# Patient Record
Sex: FEMALE | Race: WHITE | NOT HISPANIC OR LATINO | Employment: FULL TIME | URBAN - METROPOLITAN AREA
[De-identification: names, ages, dates, MRNs, and addresses within clinical notes are randomized per-mention and may not be internally consistent; named-entity substitution may affect disease eponyms.]

---

## 2017-01-29 ENCOUNTER — HOSPITAL ENCOUNTER (EMERGENCY)
Facility: MEDICAL CENTER | Age: 32
End: 2017-01-29
Attending: EMERGENCY MEDICINE
Payer: MEDICAID

## 2017-01-29 VITALS
SYSTOLIC BLOOD PRESSURE: 111 MMHG | BODY MASS INDEX: 31.64 KG/M2 | OXYGEN SATURATION: 95 % | DIASTOLIC BLOOD PRESSURE: 73 MMHG | WEIGHT: 178.57 LBS | HEART RATE: 68 BPM | RESPIRATION RATE: 16 BRPM | TEMPERATURE: 97.5 F

## 2017-01-29 DIAGNOSIS — H10.9 CONJUNCTIVITIS OF RIGHT EYE, UNSPECIFIED CONJUNCTIVITIS TYPE: ICD-10-CM

## 2017-01-29 DIAGNOSIS — H20.9 IRITIS: ICD-10-CM

## 2017-01-29 PROCEDURE — 99284 EMERGENCY DEPT VISIT MOD MDM: CPT

## 2017-01-29 RX ORDER — POLYMYXIN B SULFATE AND TRIMETHOPRIM 1; 10000 MG/ML; [USP'U]/ML
1 SOLUTION OPHTHALMIC EVERY 4 HOURS
Qty: 1 BOTTLE | Refills: 0 | Status: SHIPPED | OUTPATIENT
Start: 2017-01-29 | End: 2017-02-01

## 2017-01-29 RX ORDER — HYDROCODONE BITARTRATE AND ACETAMINOPHEN 5; 325 MG/1; MG/1
1-2 TABLET ORAL EVERY 6 HOURS PRN
Qty: 15 TAB | Refills: 0 | Status: SHIPPED | OUTPATIENT
Start: 2017-01-29 | End: 2017-06-23

## 2017-01-29 ASSESSMENT — ENCOUNTER SYMPTOMS
EYE PAIN: 1
EYE REDNESS: 1
PHOTOPHOBIA: 1

## 2017-01-29 NOTE — ED AVS SNAPSHOT
1/29/2017          Lizz Storey  [address]  [place] [address]    Dear Lizz:    [facility] wants to ensure your discharge home is safe and you or your loved ones have had all your questions answered regarding your care after you leave the hospital.    You may receive a telephone call within two days of your discharge.  This call is to make certain you understand your discharge instructions as well as ensure we provided you with the best care possible during your stay with us.     The call will only last approximately 3-5 minutes and will be done by a nurse.    Once again, we want to ensure your discharge home is safe and that you have a clear understanding of any next steps in your care.  If you have any questions or concerns, please do not hesitate to contact us, we are here for you.  Thank you for choosing [facility] for your healthcare needs.    Sincerely,    Brennan Gore    [facility]

## 2017-01-29 NOTE — ED AVS SNAPSHOT
Libra Alliance Access Code: Activation code not generated  Current Libra Alliance Status: Active    [employer]  A secure, online tool to manage your health information     [employer] Libra Alliance® is a secure, online tool that connects you to your personalized health information from the privacy of your home -- day or night - making it very easy for you to manage your healthcare. Once the activation process is completed, you can even access your medical information using the Libra Alliance eusebio, which is available for free in the Apple Eusebio store or Google Play store.     Libra Alliance provides the following levels of access (as shown below):   My Chart Features   [facility] Primary Care Doctor [facility]  Specialists [facility]  Urgent  Care Non-[facility]  Primary Care  Doctor   Email your healthcare team securely and privately 24/7 X X X X   Manage appointments: schedule your next appointment; view details of past/upcoming appointments X      Request prescription refills. X      View recent personal medical records, including lab and immunizations X X X X   View health record, including health history, allergies, medications X X X X   Read reports about your outpatient visits, procedures, consult and ER notes X X X X   See your discharge summary, which is a recap of your hospital and/or ER visit that includes your diagnosis, lab results, and care plan. X X       How to register for Libra Alliance:  1. Go to  [email or web address].  2. Click on the Sign Up Now box, which takes you to the New Member Sign Up page. You will need to provide the following information:  a. Enter your Libra Alliance Access Code exactly as it appears at the top of this page. (You will not need to use this code after you’ve completed the sign-up process. If you do not sign up before the expiration date, you must request a new code.)   b. Enter your date of birth.   c. Enter your home email address.   d. Click Submit, and follow the next screen’s instructions.  3. Create a Libra Alliance ID. This will  be your [employer] login ID and cannot be changed, so think of one that is secure and easy to remember.  4. Create a [employer] password. You can change your password at any time.  5. Enter your Password Reset Question and Answer. This can be used at a later time if you forget your password.   6. Enter your e-mail address. This allows you to receive e-mail notifications when new information is available in [employer].  7. Click Sign Up. You can now view your health information.    For assistance activating your [employer] account, call [phone]

## 2017-01-29 NOTE — ED NOTES
Pt reports foreign object to right eye after putting on eye make up yesterday. Eye is red. Pt reports visual changes. Pt in NAD

## 2017-01-29 NOTE — ED AVS SNAPSHOT
After Visit Summary                                                                                                                Lizz Storey   MRN: [id number]    Department:  [facility], Emergency Dept   Date of Visit:  1/29/2017            [facility], Emergency Dept    [address]    [place] [address]    Phone:  [phone]      You were seen by     Gonzalo Cantu M.D.      Your Diagnosis Was     Conjunctivitis of right eye, unspecified conjunctivitis type     H10.9       Follow-up Information     1. Follow up with Serg Perez M.D..    Specialty:  Ophthalmology    Contact information    [address]  [facility] [address]  [phone]        Medication Information     Review all of your home medications and newly ordered medications with your primary doctor and/or pharmacist as soon as possible. Follow medication instructions as directed by your doctor and/or pharmacist.     Please keep your complete medication list with you and share with your physician. Update the information when medications are discontinued, doses are changed, or new medications (including over-the-counter products) are added; and carry medication information at all times in the event of emergency situations.               Medication List      START taking these medications        Instructions    hydrocodone-acetaminophen 5-325 MG Tabs per tablet   Commonly known as:  NORCO    Take 1-2 Tabs by mouth every 6 hours as needed.   Dose:  1-2 Tab       polymixin-trimethoprim 12478-9.1 UNIT/ML-% Soln   Commonly known as:  POLYTRIM    Place 1 Drop in both eyes every 4 hours for 3 days.   Dose:  1 Drop         ASK your doctor about these medications        Instructions    acetaminophen-codeine #3 300-30 MG Tabs   Commonly known as:  TYLENOL #3    Take 1-2 Tabs by mouth every 6 hours as needed.   Dose:  1-2 Tab       acyclovir 400 MG tablet   Commonly known as:  ZOVIRAX    Take 400 mg by mouth  "2 times a day.   Dose:  400 mg       clindamycin 150 MG Caps   Commonly known as:  CLEOCIN    Take 1 Cap by mouth 3 times a day.   Dose:  150 mg       leuprolide 11.25 MG Kit   Commonly known as:  LUPRON PEDIATRIC    11.25 mg by Intramuscular route every 90 days.   Dose:  11.25 mg       nitrofurantoin monohydr macro 100 MG Caps   Commonly known as:  MACROBID    Take 1 Cap by mouth 2 times a day.   Dose:  100 mg       ondansetron 4 MG Tbdp   Commonly known as:  ZOFRAN ODT    Take 1 Tab by mouth every four hours as needed for Nausea/Vomiting (give PO if no IV route available).   Dose:  4 mg       tramadol 50 MG Tabs   Commonly known as:  ULTRAM    Take 50 mg by mouth every four hours as needed.   Dose:  50 mg                 Discharge Instructions       Conjunctivitis  Return to the ER if increased pain or any blurred vision. Contact ophthalmology for follow-up  Conjunctivitis is commonly called \"pink eye.\" Conjunctivitis can be caused by bacterial or viral infection, allergies, or injuries. There is usually redness of the lining of the eye, itching, discomfort, and sometimes discharge. There may be deposits of matter along the eyelids. A viral infection usually causes a watery discharge, while a bacterial infection causes a yellowish, thick discharge. Pink eye is very contagious and spreads by direct contact.  You may be given antibiotic eyedrops as part of your treatment. Before using your eye medicine, remove all drainage from the eye by washing gently with warm water and cotton balls. Continue to use the medication until you have awakened 2 mornings in a row without discharge from the eye. Do not rub your eye. This increases the irritation and helps spread infection. Use separate towels from other household members. Wash your hands with soap and water before and after touching your eyes. Use cold compresses to reduce pain and sunglasses to relieve irritation from light. Do not wear contact lenses or wear eye " makeup until the infection is gone.  SEEK MEDICAL CARE IF:   · Your symptoms are not better after 3 days of treatment.  · You have increased pain or trouble seeing.  · The outer eyelids become very red or swollen.  Document Released: 01/25/2006 Document Revised: 03/11/2013 Document Reviewed: 12/18/2006  ExitCare® Patient Information ©2014 [employer].    Iritis  Iritis is an inflammation of the colored part of the eye (iris). Other parts at the front of the eye may also be inflamed. The iris is part of the middle layer of the eyeball which is called the uvea or the uveal track. Any part of the uveal track can become inflamed. The other portions of the uveal track are the choroid (the thin membrane under the outer layer of the eye), and the ciliary body (joins the choroid and the iris and produces the fluid in the front of the eye).   It is extremely important to treat iritis early, as it may lead to internal eye damage causing scarring or diseases such as glaucoma. Some people have only one attack of iritis (in one or both eyes) in their lifetime, while others may get it many times.  CAUSES  Iritis can be associated with many different diseases, but mostly occurs in otherwise healthy people. Examples of diseases that can be associated with iritis include:  · Diseases where the body's immune system attacks tissues within your own body (autoimmune diseases).  · Infections (tuberculosis, gonorrhea, fungus infections, Lyme disease, infection of the lining of the heart).  · Trauma or injury.  · Eye diseases (acute glaucoma and others).  · Inflammation from other parts of the uveal track.  · Severe eye infections.  · Other rare diseases.  SYMPTOMS  · Eye pain or aching.  · Sensitivity to light.  · Loss of sight or blurred vision.  · Redness of the eye. This is often accompanied by a ring of redness around the outside of the cornea, or clear covering at the front of the eye (ciliary flush).  · Excessive tearing of the  "eye(s).  · A small pupil that does not enlarge in the dark and stays smaller than the other eye's pupil.  · A whitish area that obscures the lower part of the colored circular iris. Sometimes this is visible when looking at the eye, where the whitish area has a \"fluid level\" or flat top. This is called a \"hypopyon\" and is actually pus inside the eye.  Since iritis causes the eye to become red, it is often confused with a much less dangerous form of \"pink eye\" or conjunctivitis. One of the most important symptoms is sensitivity to light. Anytime there is redness, discomfort in the eye(s) and extreme light sensitivity, it is extremely important to see an ophthalmologist as soon as possible.  TREATMENT  Acute iritis requires prompt medical evaluation by an eye specialist (ophthalmologist.) Treatment depends on the underlying cause but may include:  · Corticosteroid eye drops and dilating eye drops. Follow your caregiver's exact instructions on taking and stopping corticosteroid medications (drops or pills).  · Occasionally, the iritis will be so severe that it will not respond to commonly used medications. If this happens, it may be necessary to use steroid injections. The injections are given under the eye's outer surface. Sometimes oral medications are given. The decision on treatment used for iritis is usually made on an individual basis.  HOME CARE INSTRUCTIONS  Your care giver will give specific instructions regarding the use of eye medications or other medications. Be certain to follow all instructions in both taking and stopping the medications.  SEEK IMMEDIATE MEDICAL CARE IF:  · You have redness of one or both eye.  · You experience a great deal of light sensitivity.  · You have pain or aching in either eye.  MAKE SURE YOU:   · Understand these instructions.  · Will watch your condition.  · Will get help right away if you are not doing well or get worse.  Document Released: 12/18/2006 Document Revised: " 03/11/2013 Document Reviewed: 06/06/2008  ExitCare® Patient Information ©2014 [employer].            Patient Information     Patient Information    Following emergency treatment: all patient requiring follow-up care must return either to a private physician or a clinic if your condition worsens before you are able to obtain further medical attention, please return to the emergency room.     Billing Information    At [facility], we work to make the billing process streamlined for our patients.  Our Representatives are here to answer any questions you may have regarding your hospital bill.  If you have insurance coverage and have supplied your insurance information to us, we will submit a claim to your insurer on your behalf.  Should you have any questions regarding your bill, we can be reached online or by phone as follows:  Online: You are able pay your bills online or live chat with our representatives about any billing questions you may have. We are here to help Monday - Friday from 8:00am to 7:30pm and 9:00am - 12:00pm on Saturdays.  Please visit [email or web address]  for more information.   Phone:  [phone] or [phone]    Please note that your emergency physician, surgeon, pathologist, radiologist, anesthesiologist, and other specialists are not employed by [facility] and will therefore bill separately for their services.  Please contact them directly for any questions concerning their bills at the numbers below:     Emergency Physician Services:  [phone]  [facility]:  [phone]  [facility]:  [phone]  [facility]:  [phone]    1. Your final bill may vary from the amount quoted upon discharge if all procedures are not complete at that time, or if your doctor has additional procedures of which we are not aware. You will receive an additional bill if you return to the Emergency Department at [facility]  [facility] for suture removal regardless of the facility of which the sutures were placed.     2. Please arrange for settlement of this account at the emergency registration.    3. All self-pay accounts are due in full at the time of treatment.  If you are unable to meet this obligation then payment is expected within 4-5 days.     4. If you have had radiology studies (CT, X-ray, Ultrasound, MRI), you have received a preliminary result during your emergency department visit. Please contact the radiology department [phone] to receive a copy of your final result. Please discuss the Final result with your primary physician or with the follow up physician provided.     Crisis Hotline:  [place] Crisis Hotline:  2-029-WVLSROU or 1-775.352.6060  [place] Crisis Hotline:    1-620.196.7229 or 498-850-2357         ED Discharge Follow Up Questions    1. In order to provide you with very good care, we would like to follow up with a phone call in the next few days.  May we have your permission to contact you?     YES /  NO    2. What is the best phone number to call you? (       )_____-__________    3. What is the best time to call you?      Morning  /  Afternoon  /  Evening                   Patient Signature:  ____________________________________________________________    Date:  ____________________________________________________________

## 2017-01-29 NOTE — DISCHARGE INSTRUCTIONS
"Conjunctivitis  Return to the ER if increased pain or any blurred vision. Contact ophthalmology for follow-up  Conjunctivitis is commonly called \"pink eye.\" Conjunctivitis can be caused by bacterial or viral infection, allergies, or injuries. There is usually redness of the lining of the eye, itching, discomfort, and sometimes discharge. There may be deposits of matter along the eyelids. A viral infection usually causes a watery discharge, while a bacterial infection causes a yellowish, thick discharge. Pink eye is very contagious and spreads by direct contact.  You may be given antibiotic eyedrops as part of your treatment. Before using your eye medicine, remove all drainage from the eye by washing gently with warm water and cotton balls. Continue to use the medication until you have awakened 2 mornings in a row without discharge from the eye. Do not rub your eye. This increases the irritation and helps spread infection. Use separate towels from other household members. Wash your hands with soap and water before and after touching your eyes. Use cold compresses to reduce pain and sunglasses to relieve irritation from light. Do not wear contact lenses or wear eye makeup until the infection is gone.  SEEK MEDICAL CARE IF:   · Your symptoms are not better after 3 days of treatment.  · You have increased pain or trouble seeing.  · The outer eyelids become very red or swollen.  Document Released: 01/25/2006 Document Revised: 03/11/2013 Document Reviewed: 12/18/2006  ExitCare® Patient Information ©2014 [employer].    Iritis  Iritis is an inflammation of the colored part of the eye (iris). Other parts at the front of the eye may also be inflamed. The iris is part of the middle layer of the eyeball which is called the uvea or the uveal track. Any part of the uveal track can become inflamed. The other portions of the uveal track are the choroid (the thin membrane under the outer layer of the eye), and the ciliary body " "(joins the choroid and the iris and produces the fluid in the front of the eye).   It is extremely important to treat iritis early, as it may lead to internal eye damage causing scarring or diseases such as glaucoma. Some people have only one attack of iritis (in one or both eyes) in their lifetime, while others may get it many times.  CAUSES  Iritis can be associated with many different diseases, but mostly occurs in otherwise healthy people. Examples of diseases that can be associated with iritis include:  · Diseases where the body's immune system attacks tissues within your own body (autoimmune diseases).  · Infections (tuberculosis, gonorrhea, fungus infections, Lyme disease, infection of the lining of the heart).  · Trauma or injury.  · Eye diseases (acute glaucoma and others).  · Inflammation from other parts of the uveal track.  · Severe eye infections.  · Other rare diseases.  SYMPTOMS  · Eye pain or aching.  · Sensitivity to light.  · Loss of sight or blurred vision.  · Redness of the eye. This is often accompanied by a ring of redness around the outside of the cornea, or clear covering at the front of the eye (ciliary flush).  · Excessive tearing of the eye(s).  · A small pupil that does not enlarge in the dark and stays smaller than the other eye's pupil.  · A whitish area that obscures the lower part of the colored circular iris. Sometimes this is visible when looking at the eye, where the whitish area has a \"fluid level\" or flat top. This is called a \"hypopyon\" and is actually pus inside the eye.  Since iritis causes the eye to become red, it is often confused with a much less dangerous form of \"pink eye\" or conjunctivitis. One of the most important symptoms is sensitivity to light. Anytime there is redness, discomfort in the eye(s) and extreme light sensitivity, it is extremely important to see an ophthalmologist as soon as possible.  TREATMENT  Acute iritis requires prompt medical evaluation by an eye " specialist (ophthalmologist.) Treatment depends on the underlying cause but may include:  · Corticosteroid eye drops and dilating eye drops. Follow your caregiver's exact instructions on taking and stopping corticosteroid medications (drops or pills).  · Occasionally, the iritis will be so severe that it will not respond to commonly used medications. If this happens, it may be necessary to use steroid injections. The injections are given under the eye's outer surface. Sometimes oral medications are given. The decision on treatment used for iritis is usually made on an individual basis.  HOME CARE INSTRUCTIONS  Your care giver will give specific instructions regarding the use of eye medications or other medications. Be certain to follow all instructions in both taking and stopping the medications.  SEEK IMMEDIATE MEDICAL CARE IF:  · You have redness of one or both eye.  · You experience a great deal of light sensitivity.  · You have pain or aching in either eye.  MAKE SURE YOU:   · Understand these instructions.  · Will watch your condition.  · Will get help right away if you are not doing well or get worse.  Document Released: 12/18/2006 Document Revised: 03/11/2013 Document Reviewed: 06/06/2008  [employer]® Patient Information ©2014 [employer].

## 2017-01-29 NOTE — ED NOTES
Patient given discharge instructions, follow up information, and prescriptions x 2, instructed not to drive while taking narcotics, verbalized understanding, ambulatory out of ED w/steady gait.

## 2017-01-29 NOTE — ED PROVIDER NOTES
"ED Provider Note    Scribed for Gonzalo Cantu M.D. by Viky Oh. 1/29/2017, 12:09 PM.    Primary care provider: Agustina Jimenez M.D.  Means of arrival: Private vehicle  History obtained from: Patient  History limited by: None    CHIEF COMPLAINT  Chief Complaint   Patient presents with   • Foreign Body in Eye       HPI  Lizz Storey is a 31 y.o. female who presents to the Emergency Department for evaluation of a foreign body located to her right eye. Patient first noted a sensation of a foreign body to her right eye while applying makeup. She reports that she was applying eye liner and suspects a particle of her eye crayon got into her eyes. Patient washed her eyes with no relief. She was able to sleep last night with an eye mask on but woke up with persistent sensation of foreign body. She has associated eye discomfort, eye redness and mild sensitivity to light.   Patient has no other complaints.         REVIEW OF SYSTEMS  Review of Systems   Eyes: Positive for photophobia, pain (right ) and redness.        Sensation of foreign body to right eye.           PAST MEDICAL HISTORY   has a past medical history of Endometriosis; Psychiatric disorder; Kidney infection; Cyst of ovary, left; Fibroid, uterus; ASTHMA; HPV in female; and Genital herpes in women.      SURGICAL HISTORY   has past surgical history that includes jean by laparoscopy; laparoscopy; other abdominal surgery; and appendectomy.      SOCIAL HISTORY  Social History   Substance Use Topics   • Smoking status: Current Some Day Smoker -- 0.25 packs/day for 15 years     Types: Cigarettes   • Smokeless tobacco: Never Used      Comment: pt states in the \"process of quiting\",   1 PK WEEK   • Alcohol Use: Yes      Comment: 2 times per year      History   Drug Use   • Yes   • Special: Inhaled     Comment: marijuana 2 x per month       FAMILY HISTORY  None noted       CURRENT MEDICATIONS  Home Medications     **Home medications have not yet been reviewed " for this encounter**          ALLERGIES  Allergies   Allergen Reactions   • Carrot [Daucus Carota] Anaphylaxis     Only raw carrot; cooked carrot ok.   • Ciprofloxacin Hives   • Keflex      Mild hand swelling   • Ketorolac Tromethamine Rash   • Morphine Hives   • Penicillins Rash         PHYSICAL EXAM  VITAL SIGNS: /53 mmHg  Pulse 86  Temp(Src) 36.4 °C (97.5 °F)  Resp 15  Wt 81 kg (178 lb 9.2 oz)  SpO2 95%  Constitutional: Alert in no apparent distress.  HENT: Normocephalic, Bilateral external ears normal. Nose normal.   Eyes: Pupils are equal and reactive. Moderate to severe right sided conjunctivitis without drainage. Normal pupil. No foreign body visualized with lid aversion or slit lamp exam, non-icteric.   Thorax & Lungs: Easy unlabored respirations  Abdomen:  No gross signs of peritonitis, no pain with movement   Skin: Visualized skin is  Dry, No erythema, No rash.   Extremities:  No edema, No asymmetry  Neurologic: Alert, Grossly non-focal.   Psychiatric: Affect and Mood normal        COURSE & MEDICAL DECISION MAKING  Nursing notes, VS, PMSFHx reviewed in chart.    Differential diagnoses include but not limited to: conjunctivitis possible iritis.     12:09 PM - Patient seen and examined at bedside.     12:12 PM Patient had relief of her eye pain after application of proparacaine eye drops. Performed slit lamp eye exam that indicated no significant abnormalities.     12:35 PM The patient was discharged home after her visual acuity evaluation with follow up opthalmology and prescription for a course of antibiotics.  The patient will return for new or worsening symptoms and is stable at the time of discharge.    I reviewed prescription monitoring program for patient's narcotic use before prescribing a scheduled drug.The patient will not drink alcohol nor drive with prescribed medications.            DISPOSITION:  Patient will be discharged home in stable condition.      FOLLOW UP:  Serg Perez,  M.D.  [address]  [facility] [address]  [phone]              OUTPATIENT MEDICATIONS:  New Prescriptions    HYDROCODONE-ACETAMINOPHEN (NORCO) 5-325 MG TAB PER TABLET    Take 1-2 Tabs by mouth every 6 hours as needed.    POLYMIXIN-TRIMETHOPRIM (POLYTRIM) 81158-1.1 UNIT/ML-% SOLUTION    Place 1 Drop in both eyes every 4 hours for 3 days.       FINAL IMPRESSION  1. Conjunctivitis of right eye, unspecified conjunctivitis type    2. Iritis           Viky GUIDO (Rebecca), am scribing for, and in the presence of, Gonzalo Cantu M.D..  Electronically signed by: Viky Oh (Rebecca), 1/29/2017  Gonzalo GUIDO M.D. personally performed the services described in this documentation, as scribed by Viky Oh in my presence, and it is both accurate and complete.      The note accurately reflects work and decisions made by me.  Gonzalo Cantu  1/29/2017  2:37 PM

## 2017-04-24 ENCOUNTER — NON-PROVIDER VISIT (OUTPATIENT)
Dept: URGENT CARE | Facility: PHYSICIAN GROUP | Age: 32
End: 2017-04-24

## 2017-04-24 DIAGNOSIS — Z02.1 PRE-EMPLOYMENT DRUG SCREENING: ICD-10-CM

## 2017-04-24 LAB
AMP AMPHETAMINE: NORMAL
COC COCAINE: NORMAL
INT CON NEG: NEGATIVE
INT CON POS: POSITIVE
MET METHAMPHETAMINES: NORMAL
OPI OPIATES: NORMAL
PCP PHENCYCLIDINE: NORMAL
POC DRUG COMMENT 753798-OCCUPATIONAL HEALTH: NORMAL
THC: NORMAL

## 2017-04-24 PROCEDURE — 80305 DRUG TEST PRSMV DIR OPT OBS: CPT | Performed by: PHYSICIAN ASSISTANT

## 2017-04-27 ENCOUNTER — APPOINTMENT (OUTPATIENT)
Dept: RADIOLOGY | Facility: MEDICAL CENTER | Age: 32
End: 2017-04-27
Attending: EMERGENCY MEDICINE
Payer: MEDICAID

## 2017-04-27 ENCOUNTER — HOSPITAL ENCOUNTER (EMERGENCY)
Facility: MEDICAL CENTER | Age: 32
End: 2017-04-27
Attending: EMERGENCY MEDICINE
Payer: MEDICAID

## 2017-04-27 VITALS
WEIGHT: 192.46 LBS | HEIGHT: 63 IN | RESPIRATION RATE: 17 BRPM | SYSTOLIC BLOOD PRESSURE: 111 MMHG | OXYGEN SATURATION: 96 % | BODY MASS INDEX: 34.1 KG/M2 | HEART RATE: 65 BPM | TEMPERATURE: 97 F | DIASTOLIC BLOOD PRESSURE: 74 MMHG

## 2017-04-27 DIAGNOSIS — R10.9 FLANK PAIN: ICD-10-CM

## 2017-04-27 DIAGNOSIS — N30.01 ACUTE CYSTITIS WITH HEMATURIA: ICD-10-CM

## 2017-04-27 LAB
ALBUMIN SERPL BCP-MCNC: 3.5 G/DL (ref 3.2–4.9)
ALBUMIN/GLOB SERPL: 1.2 G/DL
ALP SERPL-CCNC: 104 U/L (ref 30–99)
ALT SERPL-CCNC: 11 U/L (ref 2–50)
ANION GAP SERPL CALC-SCNC: 7 MMOL/L (ref 0–11.9)
APPEARANCE UR: CLEAR
AST SERPL-CCNC: 13 U/L (ref 12–45)
BACTERIA #/AREA URNS HPF: ABNORMAL /HPF
BASOPHILS # BLD AUTO: 0.6 % (ref 0–1.8)
BASOPHILS # BLD: 0.08 K/UL (ref 0–0.12)
BILIRUB SERPL-MCNC: 0.2 MG/DL (ref 0.1–1.5)
BILIRUB UR QL STRIP.AUTO: NEGATIVE
BUN SERPL-MCNC: 16 MG/DL (ref 8–22)
CALCIUM SERPL-MCNC: 8.7 MG/DL (ref 8.5–10.5)
CHLORIDE SERPL-SCNC: 108 MMOL/L (ref 96–112)
CO2 SERPL-SCNC: 25 MMOL/L (ref 20–33)
COLOR UR: YELLOW
CREAT SERPL-MCNC: 0.87 MG/DL (ref 0.5–1.4)
CULTURE IF INDICATED INDCX: YES UA CULTURE
EOSINOPHIL # BLD AUTO: 1.91 K/UL (ref 0–0.51)
EOSINOPHIL NFR BLD: 14.7 % (ref 0–6.9)
EPI CELLS #/AREA URNS HPF: ABNORMAL /HPF
ERYTHROCYTE [DISTWIDTH] IN BLOOD BY AUTOMATED COUNT: 45 FL (ref 35.9–50)
GFR SERPL CREATININE-BSD FRML MDRD: >60 ML/MIN/1.73 M 2
GLOBULIN SER CALC-MCNC: 2.9 G/DL (ref 1.9–3.5)
GLUCOSE SERPL-MCNC: 86 MG/DL (ref 65–99)
GLUCOSE UR STRIP.AUTO-MCNC: NEGATIVE MG/DL
HCG SERPL QL: NEGATIVE
HCT VFR BLD AUTO: 47.4 % (ref 37–47)
HGB BLD-MCNC: 16.2 G/DL (ref 12–16)
IMM GRANULOCYTES # BLD AUTO: 0.04 K/UL (ref 0–0.11)
IMM GRANULOCYTES NFR BLD AUTO: 0.3 % (ref 0–0.9)
KETONES UR STRIP.AUTO-MCNC: ABNORMAL MG/DL
LEUKOCYTE ESTERASE UR QL STRIP.AUTO: NEGATIVE
LIPASE SERPL-CCNC: <3 U/L (ref 11–82)
LYMPHOCYTES # BLD AUTO: 3.5 K/UL (ref 1–4.8)
LYMPHOCYTES NFR BLD: 27 % (ref 22–41)
MCH RBC QN AUTO: 30.5 PG (ref 27–33)
MCHC RBC AUTO-ENTMCNC: 34.2 G/DL (ref 33.6–35)
MCV RBC AUTO: 89.1 FL (ref 81.4–97.8)
MICRO URNS: ABNORMAL
MONOCYTES # BLD AUTO: 0.61 K/UL (ref 0–0.85)
MONOCYTES NFR BLD AUTO: 4.7 % (ref 0–13.4)
MUCOUS THREADS #/AREA URNS HPF: ABNORMAL /HPF
NEUTROPHILS # BLD AUTO: 6.82 K/UL (ref 2–7.15)
NEUTROPHILS NFR BLD: 52.7 % (ref 44–72)
NITRITE UR QL STRIP.AUTO: NEGATIVE
NRBC # BLD AUTO: 0 K/UL
NRBC BLD AUTO-RTO: 0 /100 WBC
PH UR STRIP.AUTO: 5.5 [PH]
PLATELET # BLD AUTO: 295 K/UL (ref 164–446)
PMV BLD AUTO: 10.1 FL (ref 9–12.9)
POTASSIUM SERPL-SCNC: 4.2 MMOL/L (ref 3.6–5.5)
PROT SERPL-MCNC: 6.4 G/DL (ref 6–8.2)
PROT UR QL STRIP: NEGATIVE MG/DL
RBC # BLD AUTO: 5.32 M/UL (ref 4.2–5.4)
RBC # URNS HPF: >150 /HPF
RBC UR QL AUTO: ABNORMAL
SODIUM SERPL-SCNC: 140 MMOL/L (ref 135–145)
SP GR UR STRIP.AUTO: 1.02
WBC # BLD AUTO: 13 K/UL (ref 4.8–10.8)
WBC #/AREA URNS HPF: ABNORMAL /HPF

## 2017-04-27 PROCEDURE — 83690 ASSAY OF LIPASE: CPT

## 2017-04-27 PROCEDURE — 80053 COMPREHEN METABOLIC PANEL: CPT

## 2017-04-27 PROCEDURE — 700111 HCHG RX REV CODE 636 W/ 250 OVERRIDE (IP): Performed by: EMERGENCY MEDICINE

## 2017-04-27 PROCEDURE — 700102 HCHG RX REV CODE 250 W/ 637 OVERRIDE(OP): Performed by: EMERGENCY MEDICINE

## 2017-04-27 PROCEDURE — 99284 EMERGENCY DEPT VISIT MOD MDM: CPT

## 2017-04-27 PROCEDURE — 96374 THER/PROPH/DIAG INJ IV PUSH: CPT

## 2017-04-27 PROCEDURE — 74176 CT ABD & PELVIS W/O CONTRAST: CPT

## 2017-04-27 PROCEDURE — 700105 HCHG RX REV CODE 258: Performed by: EMERGENCY MEDICINE

## 2017-04-27 PROCEDURE — 304562 HCHG STAT O2 MASK/CANNULA

## 2017-04-27 PROCEDURE — 94760 N-INVAS EAR/PLS OXIMETRY 1: CPT

## 2017-04-27 PROCEDURE — 84703 CHORIONIC GONADOTROPIN ASSAY: CPT

## 2017-04-27 PROCEDURE — 700101 HCHG RX REV CODE 250: Performed by: EMERGENCY MEDICINE

## 2017-04-27 PROCEDURE — 96375 TX/PRO/DX INJ NEW DRUG ADDON: CPT

## 2017-04-27 PROCEDURE — 87086 URINE CULTURE/COLONY COUNT: CPT

## 2017-04-27 PROCEDURE — 94640 AIRWAY INHALATION TREATMENT: CPT

## 2017-04-27 PROCEDURE — 96361 HYDRATE IV INFUSION ADD-ON: CPT

## 2017-04-27 PROCEDURE — 304561 HCHG STAT O2

## 2017-04-27 PROCEDURE — 85025 COMPLETE CBC W/AUTO DIFF WBC: CPT

## 2017-04-27 PROCEDURE — 81001 URINALYSIS AUTO W/SCOPE: CPT

## 2017-04-27 PROCEDURE — A9270 NON-COVERED ITEM OR SERVICE: HCPCS | Performed by: EMERGENCY MEDICINE

## 2017-04-27 PROCEDURE — 36415 COLL VENOUS BLD VENIPUNCTURE: CPT

## 2017-04-27 RX ORDER — ONDANSETRON 2 MG/ML
4 INJECTION INTRAMUSCULAR; INTRAVENOUS ONCE
Status: COMPLETED | OUTPATIENT
Start: 2017-04-27 | End: 2017-04-27

## 2017-04-27 RX ORDER — CIPROFLOXACIN 500 MG/1
500 TABLET, FILM COATED ORAL 2 TIMES DAILY
Qty: 20 TAB | Refills: 0 | Status: SHIPPED | OUTPATIENT
Start: 2017-04-27 | End: 2017-05-07

## 2017-04-27 RX ORDER — ALBUTEROL SULFATE 90 UG/1
2 AEROSOL, METERED RESPIRATORY (INHALATION) EVERY 6 HOURS PRN
Qty: 1 INHALER | Refills: 0 | Status: SHIPPED | OUTPATIENT
Start: 2017-04-27 | End: 2017-06-23

## 2017-04-27 RX ORDER — CEFTRIAXONE 1 G/1
1 INJECTION, POWDER, FOR SOLUTION INTRAMUSCULAR; INTRAVENOUS ONCE
Status: DISCONTINUED | OUTPATIENT
Start: 2017-04-28 | End: 2017-04-27

## 2017-04-27 RX ORDER — SODIUM CHLORIDE 9 MG/ML
1000 INJECTION, SOLUTION INTRAVENOUS ONCE
Status: COMPLETED | OUTPATIENT
Start: 2017-04-27 | End: 2017-04-27

## 2017-04-27 RX ORDER — CIPROFLOXACIN 500 MG/1
500 TABLET, FILM COATED ORAL ONCE
Status: COMPLETED | OUTPATIENT
Start: 2017-04-28 | End: 2017-04-27

## 2017-04-27 RX ORDER — OXYCODONE AND ACETAMINOPHEN 10; 325 MG/1; MG/1
1 TABLET ORAL EVERY 6 HOURS PRN
Qty: 10 TAB | Refills: 0 | Status: SHIPPED | OUTPATIENT
Start: 2017-04-27 | End: 2017-06-23

## 2017-04-27 RX ADMIN — HYDROMORPHONE HYDROCHLORIDE 1 MG: 1 INJECTION, SOLUTION INTRAMUSCULAR; INTRAVENOUS; SUBCUTANEOUS at 22:02

## 2017-04-27 RX ADMIN — ONDANSETRON 4 MG: 2 INJECTION INTRAMUSCULAR; INTRAVENOUS at 22:02

## 2017-04-27 RX ADMIN — CIPROFLOXACIN HYDROCHLORIDE 500 MG: 500 TABLET, FILM COATED ORAL at 23:45

## 2017-04-27 RX ADMIN — SODIUM CHLORIDE 1000 ML: 9 INJECTION, SOLUTION INTRAVENOUS at 22:09

## 2017-04-27 RX ADMIN — ALBUTEROL SULFATE 2.5 MG: 2.5 SOLUTION RESPIRATORY (INHALATION) at 22:04

## 2017-04-27 RX ADMIN — HYDROMORPHONE HYDROCHLORIDE 1 MG: 1 INJECTION, SOLUTION INTRAMUSCULAR; INTRAVENOUS; SUBCUTANEOUS at 22:38

## 2017-04-27 RX ADMIN — IPRATROPIUM BROMIDE 0.5 MG: 0.5 SOLUTION RESPIRATORY (INHALATION) at 22:04

## 2017-04-27 ASSESSMENT — COPD QUESTIONNAIRES
DURING THE PAST 4 WEEKS HOW MUCH DID YOU FEEL SHORT OF BREATH: MOST  OR ALL OF THE TIME
DO YOU EVER COUGH UP ANY MUCUS OR PHLEGM?: NO/ONLY WITH OCCASIONAL COLDS OR INFECTIONS
HAVE YOU SMOKED AT LEAST 100 CIGARETTES IN YOUR ENTIRE LIFE: YES
COPD SCREENING SCORE: 4

## 2017-04-27 ASSESSMENT — PAIN SCALES - GENERAL
PAINLEVEL_OUTOF10: 6
PAINLEVEL_OUTOF10: 7

## 2017-04-27 ASSESSMENT — LIFESTYLE VARIABLES
DO YOU DRINK ALCOHOL: NO
EVER_SMOKED: YES

## 2017-04-27 NOTE — ED AVS SNAPSHOT
Home Care Instructions                                                                                                                Lizz Storey   MRN: [id number]    Department:  [facility], Emergency Dept   Date of Visit:  4/27/2017            [facility], Emergency Dept    [address] [address]    Phone:  [phone]      You were seen by     Antonino Canada M.D.      Your Diagnosis Was     Acute cystitis with hematuria     N30.01       These are the medications you received during your hospitalization from 04/27/2017 2031 to 04/27/2017 2349     Date/Time Order Dose Route Action    04/27/2017 2209 NS infusion 1,000 mL 1,000 mL Intravenous New Bag    04/27/2017 2202 HYDROmorphone (DILAUDID) injection 1 mg 1 mg Intravenous Given    04/27/2017 2202 ondansetron (ZOFRAN) syringe/vial injection 4 mg 4 mg Intravenous Given    04/27/2017 2204 albuterol (PROVENTIL) 2.5mg/0.5ml nebulizer solution 2.5 mg 2.5 mg Nebulization Given    04/27/2017 2204 ipratropium (ATROVENT) 0.02 % nebulizer solution 0.5 mg 0.5 mg Nebulization Given    04/27/2017 2238 HYDROmorphone (DILAUDID) injection 1 mg 1 mg Intravenous Given    04/27/2017 2345 ciprofloxacin (CIPRO) tablet 500 mg 500 mg Oral Given      Follow-up Information     1. Follow up with Agustina Jimenez M.D.. Call in 1 day.    Specialty:  Family Medicine    Contact information    [address] [address] [phone]        Medication Information     Review all of your home medications and newly ordered medications with your primary doctor and/or pharmacist as soon as possible. Follow medication instructions as directed by your doctor and/or pharmacist.     Please keep your complete medication list with you and share with your physician. Update the information when medications are discontinued, doses are changed, or new medications (including over-the-counter products) are added; and carry medication  information at all times in the event of emergency situations.               Medication List      START taking these medications        Instructions    Morning Afternoon Evening Bedtime    albuterol 108 (90 BASE) MCG/ACT Aers inhalation aerosol        Inhale 2 Puffs by mouth every 6 hours as needed for Shortness of Breath.   Dose:  2 Puff                        ciprofloxacin 500 MG Tabs   Last time this was given:  500 mg on 4/27/2017 11:45 PM   Commonly known as:  CIPRO        Take 1 Tab by mouth 2 times a day for 10 days.   Dose:  500 mg                        oxycodone-acetaminophen  MG Tabs   Commonly known as:  PERCOCET-10        Take 1 Tab by mouth every 6 hours as needed for Severe Pain.   Dose:  1 Tab                          ASK your doctor about these medications        Instructions    Morning Afternoon Evening Bedtime    acetaminophen-codeine #3 300-30 MG Tabs   Commonly known as:  TYLENOL #3        Take 1-2 Tabs by mouth every 6 hours as needed.   Dose:  1-2 Tab                        acyclovir 400 MG tablet   Commonly known as:  ZOVIRAX        Take 400 mg by mouth every day.   Dose:  400 mg                        clindamycin 150 MG Caps   Commonly known as:  CLEOCIN        Take 1 Cap by mouth 3 times a day.   Dose:  150 mg                        hydrocodone-acetaminophen 5-325 MG Tabs per tablet   Commonly known as:  NORCO        Take 1-2 Tabs by mouth every 6 hours as needed.   Dose:  1-2 Tab                        leuprolide 11.25 MG Kit   Commonly known as:  LUPRON PEDIATRIC        11.25 mg by Intramuscular route every 90 days.   Dose:  11.25 mg                        nitrofurantoin monohydr macro 100 MG Caps   Commonly known as:  MACROBID        Take 1 Cap by mouth 2 times a day.   Dose:  100 mg                        ondansetron 4 MG Tbdp   Commonly known as:  ZOFRAN ODT        Take 1 Tab by mouth every four hours as needed for Nausea/Vomiting (give PO if no IV route available).   Dose:  4 mg                         tramadol 50 MG Tabs   Commonly known as:  ULTRAM        Take 50 mg by mouth every four hours as needed.   Dose:  50 mg                             Where to Get Your Medications      You can get these medications from any pharmacy     Bring a paper prescription for each of these medications    - albuterol 108 (90 BASE) MCG/ACT Aers inhalation aerosol  - ciprofloxacin 500 MG Tabs  - oxycodone-acetaminophen  MG Tabs            Procedures and tests performed during your visit     CARDIAC MONITORING    CBC WITH DIFFERENTIAL    COMP METABOLIC PANEL    CT-RENAL COLIC EVALUATION(A/P W/O)    Cardiac Monitoring    ESTIMATED GFR    HCG QUALITATIVE SERUM    LIPASE    NPPB    O2 Protocol    Pulse Ox    SALINE LOCK    URINALYSIS CULTURE, IF INDICATED    URINE CULTURE(NEW)    URINE MICROSCOPIC (W/UA)        Discharge Instructions       Urinary Tract Infection  Urinary tract infections (UTIs) can develop anywhere along your urinary tract. Your urinary tract is your body's drainage system for removing wastes and extra water. Your urinary tract includes two kidneys, two ureters, a bladder, and a urethra. Your kidneys are a pair of bean-shaped organs. Each kidney is about the size of your fist. They are located below your ribs, one on each side of your spine.  CAUSES  Infections are caused by microbes, which are microscopic organisms, including fungi, viruses, and bacteria. These organisms are so small that they can only be seen through a microscope. Bacteria are the microbes that most commonly cause UTIs.  SYMPTOMS   Symptoms of UTIs may vary by age and gender of the patient and by the location of the infection. Symptoms in young women typically include a frequent and intense urge to urinate and a painful, burning feeling in the bladder or urethra during urination. Older women and men are more likely to be tired, shaky, and weak and have muscle aches and abdominal pain. A fever may mean the infection is  in your kidneys. Other symptoms of a kidney infection include pain in your back or sides below the ribs, nausea, and vomiting.  DIAGNOSIS  To diagnose a UTI, your caregiver will ask you about your symptoms. Your caregiver also will ask to provide a urine sample. The urine sample will be tested for bacteria and white blood cells. White blood cells are made by your body to help fight infection.  TREATMENT   Typically, UTIs can be treated with medication. Because most UTIs are caused by a bacterial infection, they usually can be treated with the use of antibiotics. The choice of antibiotic and length of treatment depend on your symptoms and the type of bacteria causing your infection.  HOME CARE INSTRUCTIONS  · If you were prescribed antibiotics, take them exactly as your caregiver instructs you. Finish the medication even if you feel better after you have only taken some of the medication.  · Drink enough water and fluids to keep your urine clear or pale yellow.  · Avoid caffeine, tea, and carbonated beverages. They tend to irritate your bladder.  · Empty your bladder often. Avoid holding urine for long periods of time.  · Empty your bladder before and after sexual intercourse.  · After a bowel movement, women should cleanse from front to back. Use each tissue only once.  SEEK MEDICAL CARE IF:   · You have back pain.  · You develop a fever.  · Your symptoms do not begin to resolve within 3 days.  SEEK IMMEDIATE MEDICAL CARE IF:   · You have severe back pain or lower abdominal pain.  · You develop chills.  · You have nausea or vomiting.  · You have continued burning or discomfort with urination.  MAKE SURE YOU:   · Understand these instructions.  · Will watch your condition.  · Will get help right away if you are not doing well or get worse.     This information is not intended to replace advice given to you by your health care provider. Make sure you discuss any questions you have with your health care provider.        Document Released: 09/27/2006 Document Revised: 01/08/2016 Document Reviewed: 01/25/2013  Elsevier Interactive Patient Education ©2016 [employer]            Patient Information     Patient Information    Following emergency treatment: all patient requiring follow-up care must return either to a private physician or a clinic if your condition worsens before you are able to obtain further medical attention, please return to the emergency room.     Billing Information    At [facility], we work to make the billing process streamlined for our patients.  Our Representatives are here to answer any questions you may have regarding your hospital bill.  If you have insurance coverage and have supplied your insurance information to us, we will submit a claim to your insurer on your behalf.  Should you have any questions regarding your bill, we can be reached online or by phone as follows:  Online: You are able pay your bills online or live chat with our representatives about any billing questions you may have. We are here to help Monday - Friday from 8:00am to 7:30pm and 9:00am - 12:00pm on Saturdays.  Please visit [email or web address]  for more information.   Phone:  [phone] or [phone]    Please note that your emergency physician, surgeon, pathologist, radiologist, anesthesiologist, and other specialists are not employed by [facility] and will therefore bill separately for their services.  Please contact them directly for any questions concerning their bills at the numbers below:     Emergency Physician Services:  [phone]  [facility]:  [phone]  [facility]:  [phone]  [facility]:  [phone]    1. Your final bill may vary from the amount quoted upon discharge if all procedures are not complete at that time, or if your doctor has additional procedures of which we are not aware. You will receive an additional bill  if you return to the Emergency Department at [facility] for suture removal regardless of the facility of which the sutures were placed.     2. Please arrange for settlement of this account at the emergency registration.    3. All self-pay accounts are due in full at the time of treatment.  If you are unable to meet this obligation then payment is expected within 4-5 days.     4. If you have had radiology studies (CT, X-ray, Ultrasound, MRI), you have received a preliminary result during your emergency department visit. Please contact the radiology department [phone] to receive a copy of your final result. Please discuss the Final result with your primary physician or with the follow up physician provided.     Crisis Hotline:  [place] Crisis Hotline:  6-493-BRIOUCM or 1-477.664.7008  [place] Crisis Hotline:    1-105.715.3028 or 526-450-2926         ED Discharge Follow Up Questions    1. In order to provide you with very good care, we would like to follow up with a phone call in the next few days.  May we have your permission to contact you?     YES /  NO    2. What is the best phone number to call you? (       )_____-__________    3. What is the best time to call you?      Morning  /  Afternoon  /  Evening                   Patient Signature:  ____________________________________________________________    Date:  ____________________________________________________________

## 2017-04-27 NOTE — ED AVS SNAPSHOT
4/27/2017    Lizz Storey  [address]  [place] [address]    Dear Lizz:    [facility] wants to ensure your discharge home is safe and you or your loved ones have had all of your questions answered regarding your care after you leave the hospital.    Below is a list of resources and contact information should you have any questions regarding your hospital stay, follow-up instructions, or active medical symptoms.    Questions or Concerns Regarding… Contact   Medical Questions Related to Your Discharge  (7 days a week, 8am-5pm) Contact a Nurse Care Coordinator   [phone]   Medical Questions Not Related to Your Discharge  (24 hours a day / 7 days a week)  Contact the Nurse Health Line   [phone]    Medications or Discharge Instructions Refer to your discharge packet   or contact your [facility] Primary Care Provider   [phone]   Follow-up Appointment(s) Schedule your appointment via [employer]   or contact Scheduling [phone]   Billing Review your statement via [employer]  or contact Billing [phone]   Medical Records Review your records via [employer]   or contact Medical Records [phone]     You may receive a telephone call within two days of discharge. This call is to make certain you understand your discharge instructions and have the opportunity to have any questions answered. You can also easily access your medical information, test results and upcoming appointments via the [employer] free online health management tool. You can learn more and sign up at [employer]CloudOpt. For assistance setting up your [employer] account, please call [phone].    Once again, we want to ensure your discharge home is safe and that you have a clear understanding of any next steps in your care. If you have any questions or concerns, please do not hesitate to contact us, we are here for you. Thank you for choosing [facility] for your healthcare needs.    Sincerely,    Your [facility] Healthcare Team

## 2017-04-27 NOTE — ED AVS SNAPSHOT
[employer] Access Code: Activation code not generated  Current [employer] Status: Active    [email or web address]  A secure, online tool to manage your health information     [employer]’s [employer]® is a secure, online tool that connects you to your personalized health information from the privacy of your home -- day or night - making it very easy for you to manage your healthcare. Once the activation process is completed, you can even access your medical information using the [employer] eusebio, which is available for free in the Apple Eusebio store or Google Play store.     [employer] provides the following levels of access (as shown below):   My Chart Features   [facility] Primary Care Doctor [facility]  Specialists [facility]  Urgent  Care Non-[facility]  Primary Care  Doctor   Email your healthcare team securely and privately 24/7 X X X X   Manage appointments: schedule your next appointment; view details of past/upcoming appointments X      Request prescription refills. X      View recent personal medical records, including lab and immunizations X X X X   View health record, including health history, allergies, medications X X X X   Read reports about your outpatient visits, procedures, consult and ER notes X X X X   See your discharge summary, which is a recap of your hospital and/or ER visit that includes your diagnosis, lab results, and care plan. X X       How to register for [employer]:  1. Go to  [email or web address].  2. Click on the Sign Up Now box, which takes you to the New Member Sign Up page. You will need to provide the following information:  a. Enter your [employer] Access Code exactly as it appears at the top of this page. (You will not need to use this code after you’ve completed the sign-up process. If you do not sign up before the expiration date, you must request a new code.)   b. Enter your date of birth.   c. Enter your home email address.   d. Click Submit, and follow the next screen’s instructions.  3. Create a [employer] ID. This will  be your [employer] login ID and cannot be changed, so think of one that is secure and easy to remember.  4. Create a [employer] password. You can change your password at any time.  5. Enter your Password Reset Question and Answer. This can be used at a later time if you forget your password.   6. Enter your e-mail address. This allows you to receive e-mail notifications when new information is available in [employer].  7. Click Sign Up. You can now view your health information.    For assistance activating your [employer] account, call [phone]

## 2017-04-28 NOTE — ED NOTES
Pt ambulatory to red 12. Pt escorted to restroom for urine collection. Agree with triage note. Per pt, she has hx of multiple kidney infections with hospitalizations.

## 2017-04-28 NOTE — ED NOTES
.  Chief Complaint   Patient presents with   • UTI     pt with blood in urine and back lt flank area, has dysuria, cloudy urine, concerned for a kidney infection     .Pt Informed regarding triage process and verbalized understanding to inform triage tech or RN for any changes in condition.  Placed in lobby.

## 2017-04-28 NOTE — DISCHARGE INSTRUCTIONS
Urinary Tract Infection  Urinary tract infections (UTIs) can develop anywhere along your urinary tract. Your urinary tract is your body's drainage system for removing wastes and extra water. Your urinary tract includes two kidneys, two ureters, a bladder, and a urethra. Your kidneys are a pair of bean-shaped organs. Each kidney is about the size of your fist. They are located below your ribs, one on each side of your spine.  CAUSES  Infections are caused by microbes, which are microscopic organisms, including fungi, viruses, and bacteria. These organisms are so small that they can only be seen through a microscope. Bacteria are the microbes that most commonly cause UTIs.  SYMPTOMS   Symptoms of UTIs may vary by age and gender of the patient and by the location of the infection. Symptoms in young women typically include a frequent and intense urge to urinate and a painful, burning feeling in the bladder or urethra during urination. Older women and men are more likely to be tired, shaky, and weak and have muscle aches and abdominal pain. A fever may mean the infection is in your kidneys. Other symptoms of a kidney infection include pain in your back or sides below the ribs, nausea, and vomiting.  DIAGNOSIS  To diagnose a UTI, your caregiver will ask you about your symptoms. Your caregiver also will ask to provide a urine sample. The urine sample will be tested for bacteria and white blood cells. White blood cells are made by your body to help fight infection.  TREATMENT   Typically, UTIs can be treated with medication. Because most UTIs are caused by a bacterial infection, they usually can be treated with the use of antibiotics. The choice of antibiotic and length of treatment depend on your symptoms and the type of bacteria causing your infection.  HOME CARE INSTRUCTIONS  · If you were prescribed antibiotics, take them exactly as your caregiver instructs you. Finish the medication even if you feel better after you  have only taken some of the medication.  · Drink enough water and fluids to keep your urine clear or pale yellow.  · Avoid caffeine, tea, and carbonated beverages. They tend to irritate your bladder.  · Empty your bladder often. Avoid holding urine for long periods of time.  · Empty your bladder before and after sexual intercourse.  · After a bowel movement, women should cleanse from front to back. Use each tissue only once.  SEEK MEDICAL CARE IF:   · You have back pain.  · You develop a fever.  · Your symptoms do not begin to resolve within 3 days.  SEEK IMMEDIATE MEDICAL CARE IF:   · You have severe back pain or lower abdominal pain.  · You develop chills.  · You have nausea or vomiting.  · You have continued burning or discomfort with urination.  MAKE SURE YOU:   · Understand these instructions.  · Will watch your condition.  · Will get help right away if you are not doing well or get worse.     This information is not intended to replace advice given to you by your health care provider. Make sure you discuss any questions you have with your health care provider.     Document Released: 09/27/2006 Document Revised: 01/08/2016 Document Reviewed: 01/25/2013  [employer] Interactive Patient Education ©2016 [employer]

## 2017-04-28 NOTE — ED PROVIDER NOTES
"ED Provider Note    CHIEF COMPLAINT  Chief Complaint   Patient presents with   • UTI     pt with blood in urine and back lt flank area, has dysuria, cloudy urine, concerned for a kidney infection       HPI  Lizz Storey is a 32 y.o. female with history of endometriosis, frequent kidney infections, asthma, who presents with complaints of lower abdominal pain, left flank pain, and blood in her urine for the past day. The patient says she initially developed some mild pain around the suprapubic area, then had worsening pain into her left flank along with blood in her urine. She says this is very similar to edges had kidney infections in the past. She denies any fever but has had chills. She has had nausea and vomiting today. She denies any blood in her emesis or stools. She denies any sore throat, cough, congestion, or any difficulty breathing.    REVIEW OF SYSTEMS  See HPI for further details. All other systems are negative.     PAST MEDICAL HISTORY  Past Medical History   Diagnosis Date   • Endometriosis      with multiple lap procedures   • Psychiatric disorder      depression   • Kidney infection    • Cyst of ovary, left    • Fibroid, uterus    • ASTHMA    • HPV in female    • Genital herpes in women        FAMILY HISTORY  History reviewed. No pertinent family history.    SOCIAL HISTORY  Social History     Social History   • Marital Status: Single     Spouse Name: N/A   • Number of Children: N/A   • Years of Education: N/A     Social History Main Topics   • Smoking status: Current Some Day Smoker -- 0.25 packs/day for 15 years     Types: Cigarettes   • Smokeless tobacco: Never Used      Comment: pt states in the \"process of quiting\",   1 PK WEEK   • Alcohol Use: No      Comment: 2 times per year   • Drug Use: Yes     Special: Inhaled      Comment: marijuana 2 x per month   • Sexual Activity: Not Asked     Other Topics Concern   • None     Social History Narrative       SURGICAL HISTORY  Past Surgical History " "  Procedure Laterality Date   • Fior by laparoscopy     • Laparoscopy       X4   • Other abdominal surgery     • Appendectomy         CURRENT MEDICATIONS  Home Medications     Reviewed by Kenia Guerra R.N. (Registered Nurse) on 04/27/17 at 2128  Med List Status: Partial    Medication Last Dose Status    acetaminophen-codeine #3 (TYLENOL #3) 300-30 MG Tab not taking Active    acyclovir (ZOVIRAX) 400 MG tablet taking Active    clindamycin (CLEOCIN) 150 MG Cap taking Active    hydrocodone-acetaminophen (NORCO) 5-325 MG Tab per tablet not taking Active    leuprolide (LUPRON PEDIATRIC) 11.25 MG Kit taking Active    nitrofurantoin monohydr macro (MACROBID) 100 MG Cap not taking Active    ondansetron (ZOFRAN ODT) 4 MG TABLET DISPERSIBLE PRN Active    tramadol (ULTRAM) 50 MG Tab not taking Active                ALLERGIES  Allergies   Allergen Reactions   • Carrot [Daucus Carota] Anaphylaxis     Only raw carrot; cooked carrot ok.   • Ciprofloxacin Hives   • Keflex      Mild hand swelling   • Ketorolac Tromethamine Rash   • Morphine Hives   • Penicillins Rash       PHYSICAL EXAM  VITAL SIGNS: /74 mmHg  Pulse 67  Temp(Src) 36.1 °C (97 °F)  Resp 15  Ht 1.6 m (5' 3\")  Wt 87.3 kg (192 lb 7.4 oz)  BMI 34.10 kg/m2  SpO2 95%  Constitutional: Awake, alert, in no acute distress, Non-toxic appearance. Holding an emesis bag, appears mildly uncomfortable.   HENT: Atraumatic. Bilateral external ears normal, mucous membranes slightly dry, throat nonerythematous without exudates, nose is normal.  Eyes: PERRL, EOMI, conjunctiva moist, noninjected.  Neck: Nontender, Normal range of motion, No nuchal rigidity, No stridor.   Lymphatic: No lymphadenopathy noted.   Cardiovascular: Regular rate and rhythm, no murmurs, rubs, gallops.  Thorax & Lungs:  Good breath sounds bilaterally, no wheezes, rales, or retractions.  No chest tenderness.  Abdomen: Bowel sounds normal, Soft,  nondistended, there is mild tenderness over the " suprapubic area, nontender to the upper abdomen, or over the right lower quadrant, no rebound, guarding, masses.  Back: No spinal tenderness, there is some mild left CVA tenderness.  Extremities: Intact distal pulses, No edema, No tenderness.   Skin: Warm, Dry, No rashes.   Musculoskeletal: No joint swelling or tenderness.  Neurologic: Alert & oriented x 3, sensory and motor function normal. No focal deficits.   Psychiatric: Affect normal, Judgment normal, Mood normal.         RADIOLOGY/PROCEDURES  CT-RENAL COLIC EVALUATION(A/P W/O)   Final Result      1.  No renal stone or hydronephrosis.   2.  No focal mesenteric inflammatory process.   3.  Appendix is not visualized.            COURSE & MEDICAL DECISION MAKING  Pertinent Labs & Imaging studies reviewed. (See chart for details)  The patient presents with the above complaints. IV is placed, she was given normal saline, Dilaudid, and Zofran. CBC shows a white count of 13,000, hemoglobin 16.2, hematocrit 47, normal differential, chemistry profile normal, lipase normal, urine shows trace ketones, large blood, 10-20 WBC, greater than 150 RBC, few bacteria.  Renal CT was obtained which shows no renal stone or hydronephrosis. No obvious inflammatory abnormalities. Findings were discussed with the patient. As she is symptomatic with a possible urinary tract infection, she will be placed on antibiotics. She has multiple allergies to antibiotics.  I initially was going to give a dose of Rocephin, but she says that Keflex made her hands swell up. She has tolerated Cipro in the past. It is listed as one of her allergies, but she says she has taken Cipro without difficulty. She said she developed some redness in her arm when she got Cipro through the IV once. The patient will be placed on a course of Cipro.  [place] shows that she was receiving multiple prescriptions for narcotics from Dr. Hinson for her endometriosis. She has not received a prescription in the last 2 months.   She will be given Percocet 10/325, #10, and is told that she would need to get further narcotic pain medications from either Dr. Hinson or from her primary care physician.    FINAL IMPRESSION  1. Urinary tract infection  2. Left flank pain  3.         Electronically signed by: Antonino Canada, 4/27/2017 11:45 PM

## 2017-04-28 NOTE — FLOWSHEET NOTE
04/27/17 2204   Events/Summary/Plan   Events/Summary/Plan y   Interdisciplinary Plan of Care-Goals (Indications)   Obstructive Ventilatory Defect or Pulmonary Disease without Obvious Obstruction History / Diagnosis   Interdisciplinary Plan of Care-Outcomes    Bronchodilator Outcome Diminished Wheezing and Volume of Air Movement Increased   Education   Education Yes - Pt. / Family has been Instructed in use of Respiratory Equipment   RT Assessment of Delivered Medications   Evaluation of Medication Delivery Daily Yes-- Pt /Family has been Instructed in use of Respiratory Medications and Adverse Reactions   SVN Group   #SVN Performed Yes   Given By: Mouthpiece   Date SVN Last Changed 04/27/17   Date SVN Next Change Due (Q 7 Days) 05/04/17   Respiratory WDL   Respiratory (WDL) X   Chest Exam   Work Of Breathing / Effort Mild   Respiration 18   Pulse 67   Heart Rate (Monitored) 68   Oximetry   Continuous Oximetry Yes   Oxygen   Pulse Oximetry 91 %   O2 (LPM) 2   O2 Daily Delivery Respiratory  Nasal Cannula

## 2017-04-29 LAB
BACTERIA UR CULT: NORMAL
SIGNIFICANT IND 70042: NORMAL
SITE SITE: NORMAL
SOURCE SOURCE: NORMAL

## 2017-06-23 ENCOUNTER — HOSPITAL ENCOUNTER (EMERGENCY)
Facility: MEDICAL CENTER | Age: 32
End: 2017-06-23
Attending: EMERGENCY MEDICINE
Payer: MEDICAID

## 2017-06-23 ENCOUNTER — APPOINTMENT (OUTPATIENT)
Dept: RADIOLOGY | Facility: MEDICAL CENTER | Age: 32
End: 2017-06-23
Attending: EMERGENCY MEDICINE
Payer: MEDICAID

## 2017-06-23 VITALS
BODY MASS INDEX: 34.69 KG/M2 | HEART RATE: 60 BPM | WEIGHT: 195.77 LBS | TEMPERATURE: 98.5 F | RESPIRATION RATE: 10 BRPM | DIASTOLIC BLOOD PRESSURE: 75 MMHG | SYSTOLIC BLOOD PRESSURE: 111 MMHG | HEIGHT: 63 IN | OXYGEN SATURATION: 90 %

## 2017-06-23 DIAGNOSIS — R07.89 OTHER CHEST PAIN: ICD-10-CM

## 2017-06-23 LAB
ALBUMIN SERPL BCP-MCNC: 4 G/DL (ref 3.2–4.9)
ALBUMIN/GLOB SERPL: 1.2 G/DL
ALP SERPL-CCNC: 186 U/L (ref 30–99)
ALT SERPL-CCNC: 136 U/L (ref 2–50)
ANION GAP SERPL CALC-SCNC: 9 MMOL/L (ref 0–11.9)
AST SERPL-CCNC: 166 U/L (ref 12–45)
BASOPHILS # BLD AUTO: 0 % (ref 0–1.8)
BASOPHILS # BLD: 0 K/UL (ref 0–0.12)
BILIRUB SERPL-MCNC: 1.1 MG/DL (ref 0.1–1.5)
BUN SERPL-MCNC: 12 MG/DL (ref 8–22)
CALCIUM SERPL-MCNC: 9.1 MG/DL (ref 8.4–10.2)
CHLORIDE SERPL-SCNC: 107 MMOL/L (ref 96–112)
CO2 SERPL-SCNC: 24 MMOL/L (ref 20–33)
CREAT SERPL-MCNC: 0.71 MG/DL (ref 0.5–1.4)
DEPRECATED D DIMER PPP IA-ACNC: <200 NG/ML(D-DU)
EKG IMPRESSION: NORMAL
EOSINOPHIL # BLD AUTO: 0.71 K/UL (ref 0–0.51)
EOSINOPHIL NFR BLD: 9 % (ref 0–6.9)
ERYTHROCYTE [DISTWIDTH] IN BLOOD BY AUTOMATED COUNT: 44.1 FL (ref 35.9–50)
GFR SERPL CREATININE-BSD FRML MDRD: >60 ML/MIN/1.73 M 2
GLOBULIN SER CALC-MCNC: 3.4 G/DL (ref 1.9–3.5)
GLUCOSE SERPL-MCNC: 94 MG/DL (ref 65–99)
HCT VFR BLD AUTO: 47.9 % (ref 37–47)
HGB BLD-MCNC: 16.3 G/DL (ref 12–16)
LYMPHOCYTES # BLD AUTO: 3.32 K/UL (ref 1–4.8)
LYMPHOCYTES NFR BLD: 42 % (ref 22–41)
MANUAL DIFF BLD: NORMAL
MCH RBC QN AUTO: 30.1 PG (ref 27–33)
MCHC RBC AUTO-ENTMCNC: 34 G/DL (ref 33.6–35)
MCV RBC AUTO: 88.4 FL (ref 81.4–97.8)
MONOCYTES # BLD AUTO: 0.47 K/UL (ref 0–0.85)
MONOCYTES NFR BLD AUTO: 6 % (ref 0–13.4)
NEUTROPHILS # BLD AUTO: 3.4 K/UL (ref 2–7.15)
NEUTROPHILS NFR BLD: 43 % (ref 44–72)
NRBC # BLD AUTO: 0 K/UL
NRBC BLD AUTO-RTO: 0 /100 WBC
PLATELET # BLD AUTO: 271 K/UL (ref 164–446)
PLATELET BLD QL SMEAR: NORMAL
PMV BLD AUTO: 9.5 FL (ref 9–12.9)
POTASSIUM SERPL-SCNC: 4 MMOL/L (ref 3.6–5.5)
PROT SERPL-MCNC: 7.4 G/DL (ref 6–8.2)
RBC # BLD AUTO: 5.42 M/UL (ref 4.2–5.4)
RBC BLD AUTO: NORMAL
SODIUM SERPL-SCNC: 140 MMOL/L (ref 135–145)
TROPONIN I SERPL-MCNC: <0.02 NG/ML (ref 0–0.04)
VARIANT LYMPHS BLD QL SMEAR: NORMAL
WBC # BLD AUTO: 7.9 K/UL (ref 4.8–10.8)

## 2017-06-23 PROCEDURE — 80053 COMPREHEN METABOLIC PANEL: CPT

## 2017-06-23 PROCEDURE — 99284 EMERGENCY DEPT VISIT MOD MDM: CPT

## 2017-06-23 PROCEDURE — 36415 COLL VENOUS BLD VENIPUNCTURE: CPT

## 2017-06-23 PROCEDURE — 85027 COMPLETE CBC AUTOMATED: CPT

## 2017-06-23 PROCEDURE — 85379 FIBRIN DEGRADATION QUANT: CPT

## 2017-06-23 PROCEDURE — 700102 HCHG RX REV CODE 250 W/ 637 OVERRIDE(OP): Performed by: EMERGENCY MEDICINE

## 2017-06-23 PROCEDURE — 71020 DX-CHEST-2 VIEWS: CPT

## 2017-06-23 PROCEDURE — A9270 NON-COVERED ITEM OR SERVICE: HCPCS | Performed by: EMERGENCY MEDICINE

## 2017-06-23 PROCEDURE — 84484 ASSAY OF TROPONIN QUANT: CPT

## 2017-06-23 PROCEDURE — 93005 ELECTROCARDIOGRAM TRACING: CPT

## 2017-06-23 PROCEDURE — 94760 N-INVAS EAR/PLS OXIMETRY 1: CPT

## 2017-06-23 PROCEDURE — 85007 BL SMEAR W/DIFF WBC COUNT: CPT

## 2017-06-23 RX ORDER — ALBUTEROL SULFATE 2.5 MG/3ML
2.5 SOLUTION RESPIRATORY (INHALATION) EVERY 4 HOURS PRN
Status: SHIPPED | COMMUNITY
End: 2017-06-23

## 2017-06-23 RX ORDER — IPRATROPIUM BROMIDE AND ALBUTEROL SULFATE 2.5; .5 MG/3ML; MG/3ML
3 SOLUTION RESPIRATORY (INHALATION) 4 TIMES DAILY
Status: SHIPPED | COMMUNITY
End: 2017-06-23

## 2017-06-23 RX ORDER — ALBUTEROL SULFATE 90 UG/1
2 AEROSOL, METERED RESPIRATORY (INHALATION) EVERY 6 HOURS PRN
Qty: 1 INHALER | Refills: 0 | Status: SHIPPED | OUTPATIENT
Start: 2017-06-23 | End: 2019-09-25

## 2017-06-23 RX ORDER — ALBUTEROL SULFATE 90 UG/1
2 AEROSOL, METERED RESPIRATORY (INHALATION) EVERY 6 HOURS PRN
Qty: 8.5 G | Refills: 3 | Status: SHIPPED | OUTPATIENT
Start: 2017-06-23 | End: 2018-03-30 | Stop reason: SDUPTHER

## 2017-06-23 RX ADMIN — LIDOCAINE HYDROCHLORIDE 30 ML: 20 SOLUTION OROPHARYNGEAL at 14:13

## 2017-06-23 ASSESSMENT — PAIN SCALES - GENERAL: PAINLEVEL_OUTOF10: 7

## 2017-06-23 NOTE — DISCHARGE INSTRUCTIONS
Gastroesophageal Reflux Disease, Adult  Gastroesophageal reflux disease (GERD) happens when acid from your stomach goes into your food pipe (esophagus). The acid can cause a burning feeling in your chest. Over time, the acid can make small holes (ulcers) in your food pipe.   HOME CARE  · Ask your doctor for advice about:  ¨ Losing weight.  ¨ Quitting smoking.  ¨ Alcohol use.  · Avoid foods and drinks that make your problems worse. You may want to avoid:  ¨ Caffeine and alcohol.  ¨ Chocolate.  ¨ Mints.  ¨ Garlic and onions.  ¨ Spicy foods.  ¨ Citrus fruits, such as oranges, ruth, or limes.  ¨ Foods that contain tomato, such as sauce, chili, salsa, and pizza.  ¨ Fried and fatty foods.  · Avoid lying down for 3 hours before you go to bed or before you take a nap.  · Eat small meals often, instead of large meals.  · Wear loose-fitting clothing. Do not wear anything tight around your waist.  · Raise (elevate) the head of your bed 6 to 8 inches with wood blocks. Using extra pillows does not help.  · Only take medicines as told by your doctor.  · Do not take aspirin or ibuprofen.  GET HELP RIGHT AWAY IF:   · You have pain in your arms, neck, jaw, teeth, or back.  · Your pain gets worse or changes.  · You feel sick to your stomach (nauseous), throw up (vomit), or sweat (diaphoresis).  · You feel short of breath, or you pass out (faint).  · Your throw up is green, yellow, black, or looks like coffee grounds or blood.  · Your poop (stool) is red, bloody, or black.  MAKE SURE YOU:   · Understand these instructions.  · Will watch your condition.  · Will get help right away if you are not doing well or get worse.     This information is not intended to replace advice given to you by your health care provider. Make sure you discuss any questions you have with your health care provider.     Document Released: 06/05/2009 Document Revised: 03/11/2013 Document Reviewed: 04/13/2016  [employer] Interactive Patient Education ©2016  [employer]

## 2017-06-23 NOTE — ED AVS SNAPSHOT
6/23/2017    Lizz Storey  [address]  [place] [address]    Dear Lizz:    [facility] wants to ensure your discharge home is safe and you or your loved ones have had all of your questions answered regarding your care after you leave the hospital.    Below is a list of resources and contact information should you have any questions regarding your hospital stay, follow-up instructions, or active medical symptoms.    Questions or Concerns Regarding… Contact   Medical Questions Related to Your Discharge  (7 days a week, 8am-5pm) Contact a Nurse Care Coordinator   [phone]   Medical Questions Not Related to Your Discharge  (24 hours a day / 7 days a week)  Contact the Nurse Health Line   [phone]    Medications or Discharge Instructions Refer to your discharge packet   or contact your [facility] Primary Care Provider   [phone]   Follow-up Appointment(s) Schedule your appointment via EveryRack   or contact Scheduling [phone]   Billing Review your statement via EveryRack  or contact Billing [phone]   Medical Records Review your records via EveryRack   or contact Medical Records [phone]     You may receive a telephone call within two days of discharge. This call is to make certain you understand your discharge instructions and have the opportunity to have any questions answered. You can also easily access your medical information, test results and upcoming appointments via the EveryRack free online health management tool. You can learn more and sign up at [email or web address]. For assistance setting up your EveryRack account, please call [phone].    Once again, we want to ensure your discharge home is safe and that you have a clear understanding of any next steps in your care. If you have any questions or concerns, please do not hesitate to contact us, we are here for you. Thank you for choosing [facility] for your healthcare needs.    Sincerely,    Your [facility] Healthcare Team

## 2017-06-23 NOTE — ED AVS SNAPSHOT
Home Care Instructions                                                                                                                Lizz Storey   MRN: [id number]    Department:  [facility], Emergency Dept   Date of Visit:  6/23/2017            [facility], Emergency Dept    [address]    [place] [address]    Phone:  [phone]      You were seen by     Dwaine Castillo M.D.      Your Diagnosis Was     Other chest pain     R07.89       These are the medications you received during your hospitalization from 06/23/2017 1322 to 06/23/2017 1544     Date/Time Order Dose Route Action    06/23/2017 1413 hyoscyamine-maalox plus-lidocaine viscous (GI COCKTAIL) oral susp 30 mL 30 mL Oral Given      Follow-up Information     1. Follow up with [facility], Emergency Dept.    Specialty:  Emergency Medicine    Why:  As needed, If symptoms worsen    Contact information    [address] [id number] [phone]      Medication Information     Review all of your home medications and newly ordered medications with your primary doctor and/or pharmacist as soon as possible. Follow medication instructions as directed by your doctor and/or pharmacist.     Please keep your complete medication list with you and share with your physician. Update the information when medications are discontinued, doses are changed, or new medications (including over-the-counter products) are added; and carry medication information at all times in the event of emergency situations.               Medication List      CHANGE how you take these medications        Instructions    Morning Afternoon Evening Bedtime    * albuterol 108 (90 BASE) MCG/ACT Aers inhalation aerosol   What changed:  You were already taking a medication with the same name, and this prescription was added. Make sure you understand how and when to take each.        Inhale 2 Puffs by  mouth every 6 hours as needed for Shortness of Breath.   Dose:  2 Puff                        * albuterol 108 (90 BASE) MCG/ACT Aers inhalation aerosol   What changed:  Another medication with the same name was added. Make sure you understand how and when to take each.        Inhale 2 Puffs by mouth every 6 hours as needed for Shortness of Breath.   Dose:  2 Puff                        * Notice:  This list has 2 medication(s) that are the same as other medications prescribed for you. Read the directions carefully, and ask your doctor or other care provider to review them with you.      ASK your doctor about these medications        Instructions    Morning Afternoon Evening Bedtime    acyclovir 400 MG tablet   Commonly known as:  ZOVIRAX        Take 400 mg by mouth every day.   Dose:  400 mg                        clindamycin 150 MG Caps   Commonly known as:  CLEOCIN        Take 1 Cap by mouth 3 times a day.   Dose:  150 mg                        leuprolide 11.25 MG Kit   Commonly known as:  LUPRON PEDIATRIC        11.25 mg by Intramuscular route every 90 days.   Dose:  11.25 mg                             Where to Get Your Medications      These medications were sent to [facility] [address] [place] [address] [address]   [address] [place] [address]     Phone:  [phone]    - albuterol 108 (90 BASE) MCG/ACT Aers inhalation aerosol      You can get these medications from any pharmacy     Bring a paper prescription for each of these medications    - albuterol 108 (90 BASE) MCG/ACT Aers inhalation aerosol            Procedures and tests performed during your visit     CBC w/ Differential    Complete Metabolic Panel (CMP)    D-Dimer (only helpful in low pre-test probability wells critieria. Do not order if patient ruled out by PERC criteria. See Weblinks at top of Labs section)    DIFFERENTIAL MANUAL    DX-CHEST-2 VIEWS    EKG (NOW)    ESTIMATED GFR    MORPHOLOGY    PLATELET ESTIMATE    Troponin STAT        Discharge  Instructions       Gastroesophageal Reflux Disease, Adult  Gastroesophageal reflux disease (GERD) happens when acid from your stomach goes into your food pipe (esophagus). The acid can cause a burning feeling in your chest. Over time, the acid can make small holes (ulcers) in your food pipe.   HOME CARE  · Ask your doctor for advice about:  ¨ Losing weight.  ¨ Quitting smoking.  ¨ Alcohol use.  · Avoid foods and drinks that make your problems worse. You may want to avoid:  ¨ Caffeine and alcohol.  ¨ Chocolate.  ¨ Mints.  ¨ Garlic and onions.  ¨ Spicy foods.  ¨ Citrus fruits, such as oranges, ruth, or limes.  ¨ Foods that contain tomato, such as sauce, chili, salsa, and pizza.  ¨ Fried and fatty foods.  · Avoid lying down for 3 hours before you go to bed or before you take a nap.  · Eat small meals often, instead of large meals.  · Wear loose-fitting clothing. Do not wear anything tight around your waist.  · Raise (elevate) the head of your bed 6 to 8 inches with wood blocks. Using extra pillows does not help.  · Only take medicines as told by your doctor.  · Do not take aspirin or ibuprofen.  GET HELP RIGHT AWAY IF:   · You have pain in your arms, neck, jaw, teeth, or back.  · Your pain gets worse or changes.  · You feel sick to your stomach (nauseous), throw up (vomit), or sweat (diaphoresis).  · You feel short of breath, or you pass out (faint).  · Your throw up is green, yellow, black, or looks like coffee grounds or blood.  · Your poop (stool) is red, bloody, or black.  MAKE SURE YOU:   · Understand these instructions.  · Will watch your condition.  · Will get help right away if you are not doing well or get worse.     This information is not intended to replace advice given to you by your health care provider. Make sure you discuss any questions you have with your health care provider.     Document Released: 06/05/2009 Document Revised: 03/11/2013 Document Reviewed: 04/13/2016  [employer] Patient  Education ©2016 [employer]            Patient Information     Patient Information    Following emergency treatment: all patient requiring follow-up care must return either to a private physician or a clinic if your condition worsens before you are able to obtain further medical attention, please return to the emergency room.     Billing Information    At [facility], we work to make the billing process streamlined for our patients.  Our Representatives are here to answer any questions you may have regarding your hospital bill.  If you have insurance coverage and have supplied your insurance information to us, we will submit a claim to your insurer on your behalf.  Should you have any questions regarding your bill, we can be reached online or by phone as follows:  Online: You are able pay your bills online or live chat with our representatives about any billing questions you may have. We are here to help Monday - Friday from 8:00am to 7:30pm and 9:00am - 12:00pm on Saturdays.  Please visit [email or web address]  for more information.   Phone:  [phone] or [phone]    Please note that your emergency physician, surgeon, pathologist, radiologist, anesthesiologist, and other specialists are not employed by [facility] and will therefore bill separately for their services.  Please contact them directly for any questions concerning their bills at the numbers below:     Emergency Physician Services:  [phone]  [facility]:  [phone]  [facility]:  [phone]  [facility]:  [phone]    1. Your final bill may vary from the amount quoted upon discharge if all procedures are not complete at that time, or if your doctor has additional procedures of which we are not aware. You will receive an additional bill if you return to the Emergency Department at [facility] for suture removal regardless of the facility of which the  sutures were placed.     2. Please arrange for settlement of this account at the emergency registration.    3. All self-pay accounts are due in full at the time of treatment.  If you are unable to meet this obligation then payment is expected within 4-5 days.     4. If you have had radiology studies (CT, X-ray, Ultrasound, MRI), you have received a preliminary result during your emergency department visit. Please contact the radiology department [phone] to receive a copy of your final result. Please discuss the Final result with your primary physician or with the follow up physician provided.     Crisis Hotline:  [place] Crisis Hotline:  0-178-BRQDWBU or 1-502.589.2571  [place] Crisis Hotline:    1-741.157.5190 or 973-807-8047         ED Discharge Follow Up Questions    1. In order to provide you with very good care, we would like to follow up with a phone call in the next few days.  May we have your permission to contact you?     YES /  NO    2. What is the best phone number to call you? (       )_____-__________    3. What is the best time to call you?      Morning  /  Afternoon  /  Evening                   Patient Signature:  ____________________________________________________________    Date:  ____________________________________________________________

## 2017-06-23 NOTE — ED AVS SNAPSHOT
Domain [employer] Access Code: Activation code not generated  Current Domain Surgical Status: Active    [employer]  A secure, online tool to manage your health information     [employer]’s Domain [employer]® is a secure, online tool that connects you to your personalized health information from the privacy of your home -- day or night - making it very easy for you to manage your healthcare. Once the activation process is completed, you can even access your medical information using the Domain Surgical eusebio, which is available for free in the Apple Eusebio store or Google Play store.     Domain [employer] provides the following levels of access (as shown below):   My Chart Features   [facility] Primary Care Doctor [facility]  Specialists [facility]  Urgent  Care Non-[facility]  Primary Care  Doctor   Email your healthcare team securely and privately 24/7 X X X X   Manage appointments: schedule your next appointment; view details of past/upcoming appointments X      Request prescription refills. X      View recent personal medical records, including lab and immunizations X X X X   View health record, including health history, allergies, medications X X X X   Read reports about your outpatient visits, procedures, consult and ER notes X X X X   See your discharge summary, which is a recap of your hospital and/or ER visit that includes your diagnosis, lab results, and care plan. X X       How to register for Domain [employer]:  1. Go to  [email or web address].  2. Click on the Sign Up Now box, which takes you to the New Member Sign Up page. You will need to provide the following information:  a. Enter your Domain Surgical Access Code exactly as it appears at the top of this page. (You will not need to use this code after you’ve completed the sign-up process. If you do not sign up before the expiration date, you must request a new code.)   b. Enter your date of birth.   c. Enter your home email address.   d. Click Submit, and follow the next screen’s instructions.  3. Create a Domain Surgical ID. This will  be your mInfo login ID and cannot be changed, so think of one that is secure and easy to remember.  4. Create a mInfo password. You can change your password at any time.  5. Enter your Password Reset Question and Answer. This can be used at a later time if you forget your password.   6. Enter your e-mail address. This allows you to receive e-mail notifications when new information is available in mInfo.  7. Click Sign Up. You can now view your health information.    For assistance activating your mInfo account, call [phone]

## 2017-06-23 NOTE — ED NOTES
Med Rec completed per patient  Allergies reviewed    Patient is currently on day 7 of 7 of her antibiotics Clindamycin for tooth infection
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
## 2017-11-07 ENCOUNTER — APPOINTMENT (OUTPATIENT)
Dept: RADIOLOGY | Facility: MEDICAL CENTER | Age: 32
End: 2017-11-07
Attending: EMERGENCY MEDICINE
Payer: MEDICAID

## 2017-11-07 ENCOUNTER — HOSPITAL ENCOUNTER (EMERGENCY)
Facility: MEDICAL CENTER | Age: 32
End: 2017-11-07
Attending: EMERGENCY MEDICINE
Payer: MEDICAID

## 2017-11-07 VITALS
HEIGHT: 63 IN | BODY MASS INDEX: 39.18 KG/M2 | TEMPERATURE: 97.6 F | WEIGHT: 221.12 LBS | OXYGEN SATURATION: 94 % | HEART RATE: 57 BPM | SYSTOLIC BLOOD PRESSURE: 128 MMHG | DIASTOLIC BLOOD PRESSURE: 74 MMHG | RESPIRATION RATE: 18 BRPM

## 2017-11-07 DIAGNOSIS — R10.9 LEFT FLANK PAIN: ICD-10-CM

## 2017-11-07 DIAGNOSIS — R31.9 HEMATURIA, UNSPECIFIED TYPE: ICD-10-CM

## 2017-11-07 LAB
APPEARANCE UR: ABNORMAL
BACTERIA #/AREA URNS HPF: ABNORMAL /HPF
BILIRUB UR QL STRIP.AUTO: NEGATIVE
COLOR UR: YELLOW
CULTURE IF INDICATED INDCX: NO UA CULTURE
EPI CELLS #/AREA URNS HPF: ABNORMAL /HPF
GLUCOSE UR STRIP.AUTO-MCNC: NEGATIVE MG/DL
HCG UR QL: NEGATIVE
KETONES UR STRIP.AUTO-MCNC: NEGATIVE MG/DL
LEUKOCYTE ESTERASE UR QL STRIP.AUTO: NEGATIVE
MICRO URNS: ABNORMAL
MUCOUS THREADS #/AREA URNS HPF: ABNORMAL /HPF
NITRITE UR QL STRIP.AUTO: NEGATIVE
PH UR STRIP.AUTO: 7 [PH]
PROT UR QL STRIP: NEGATIVE MG/DL
RBC # URNS HPF: ABNORMAL /HPF
RBC UR QL AUTO: ABNORMAL
SP GR UR REFRACTOMETRY: 1.02
SP GR UR STRIP.AUTO: 1.01
WBC #/AREA URNS HPF: ABNORMAL /HPF

## 2017-11-07 PROCEDURE — 99284 EMERGENCY DEPT VISIT MOD MDM: CPT

## 2017-11-07 PROCEDURE — 81001 URINALYSIS AUTO W/SCOPE: CPT

## 2017-11-07 PROCEDURE — 96372 THER/PROPH/DIAG INJ SC/IM: CPT

## 2017-11-07 PROCEDURE — 81025 URINE PREGNANCY TEST: CPT

## 2017-11-07 PROCEDURE — 76775 US EXAM ABDO BACK WALL LIM: CPT

## 2017-11-07 PROCEDURE — 700111 HCHG RX REV CODE 636 W/ 250 OVERRIDE (IP): Performed by: EMERGENCY MEDICINE

## 2017-11-07 RX ORDER — IPRATROPIUM BROMIDE AND ALBUTEROL SULFATE 2.5; .5 MG/3ML; MG/3ML
3 SOLUTION RESPIRATORY (INHALATION) EVERY 4 HOURS PRN
Qty: 30 BULLET | Refills: 0 | Status: SHIPPED | OUTPATIENT
Start: 2017-11-07 | End: 2019-12-02 | Stop reason: SDUPTHER

## 2017-11-07 RX ORDER — NAPROXEN 500 MG/1
500 TABLET ORAL 2 TIMES DAILY WITH MEALS
Qty: 20 TAB | Refills: 0 | Status: SHIPPED | OUTPATIENT
Start: 2017-11-07 | End: 2018-03-30

## 2017-11-07 RX ADMIN — HYDROMORPHONE HYDROCHLORIDE 1 MG: 1 INJECTION, SOLUTION INTRAMUSCULAR; INTRAVENOUS; SUBCUTANEOUS at 20:37

## 2017-11-07 ASSESSMENT — PAIN SCALES - GENERAL
PAINLEVEL_OUTOF10: 7
PAINLEVEL_OUTOF10: 4

## 2017-11-08 ENCOUNTER — PATIENT OUTREACH (OUTPATIENT)
Dept: HEALTH INFORMATION MANAGEMENT | Facility: OTHER | Age: 32
End: 2017-11-08

## 2017-11-08 NOTE — PROGRESS NOTES
Placed discharge outreach phone call to patient s/p ER discharge 11/7/17.  Left voicemail providing my contact information and instructions to call with any questions or concerns.

## 2017-11-08 NOTE — ED PROVIDER NOTES
"ED Provider Note    CHIEF COMPLAINT  Chief Complaint   Patient presents with   • Painful Urination     Pt c/o painful urination x 5 days   • Blood in Urine     Pt c/o hematuria that began today   • Back Pain     Pt c/o mid back pain \"around kidney's\"       HPI  Lizz Storey is a 32 y.o. female who presents For evaluation of hematuria, dysuria and low back pain, the pain and the dysuria began 5 days ago with hematuria beginning today. No fever, she's had these symptoms in the past and states that they've looked many times for kidney stones and have never found any kidney stones. She's also had multiple kidney infections. Patient denies fever, this been no vomiting but she does report feeling nauseated. She has no chest pain or shortness of breath or any other complaints. History of endometriosis as well as uterine fibroids and genital herpes.     REVIEW OF SYSTEMS  Negative for fever, rash, chest pain, dyspnea, vomiting, diarrhea, headache, focal weakness, focal numbness, focal tingling. All other systems are negative.     PAST MEDICAL HISTORY  Past Medical History:   Diagnosis Date   • ASTHMA    • Cyst of ovary, left    • Endometriosis     with multiple lap procedures   • Fibroid, uterus    • Genital herpes in women    • HPV in female    • Kidney infection    • Psychiatric disorder     depression       FAMILY HISTORY  No family history on file.    SOCIAL HISTORY  Social History   Substance Use Topics   • Smoking status: Current Some Day Smoker     Packs/day: 0.25     Years: 15.00     Types: Cigarettes   • Smokeless tobacco: Never Used      Comment: pt states in the \"process of quiting\",   1 PK WEEK   • Alcohol use No      Comment: 2 times per year       SURGICAL HISTORY  Past Surgical History:   Procedure Laterality Date   • APPENDECTOMY     • CHRISTEL BY LAPAROSCOPY     • LAPAROSCOPY      X4   • OTHER ABDOMINAL SURGERY         CURRENT MEDICATIONS  I personally reviewed the medication list in the charting " "documentation.     ALLERGIES  Allergies   Allergen Reactions   • Carrot [Daucus Carota] Anaphylaxis     Only raw carrot; cooked carrot ok.   • Ciprofloxacin Hives   • Keflex      Mild hand swelling   • Ketorolac Tromethamine Rash   • Morphine Hives   • Penicillins Rash       MEDICAL RECORD  I have reviewed patient's medical record and pertinent results are listed above.      PHYSICAL EXAM  VITAL SIGNS: /74   Pulse 62   Temp 36.1 °C (97 °F)   Resp 18   Ht 1.6 m (5' 3\")   Wt 100.3 kg (221 lb 1.9 oz)   SpO2 99%   BMI 39.17 kg/m²    Constitutional: Well appearing patient in no acute distress.  Not toxic, nor ill in appearance.  HENT: Mucus membranes moist.    Eyes: No scleral icterus. Normal conjunctiva   Neck: Supple, comfortable, nonpainful range of motion.   Thorax: Comfortable nonlabored respirations  Abdomen: Soft, moderate tenderness in the left upper and lower quadrants as well as suprapubic region but no rebound, no guarding. The abdomen is nondistended.  Skin: Warm, dry. No rash appreciated  Neurologic: Alert & oriented. No focal deficits observed.   Psychiatric: Normal affect appropriate for the clinical situation.    DIAGNOSTIC STUDIES / PROCEDURES    LABS  Results for orders placed or performed during the hospital encounter of 11/07/17   URINALYSIS,CULTURE IF INDICATED   Result Value Ref Range    Color Yellow     Character Hazy (A)     Specific Gravity 1.015 <1.035    Ph 7.0 5.0 - 8.0    Glucose Negative Negative mg/dL    Ketones Negative Negative mg/dL    Protein Negative Negative mg/dL    Bilirubin Negative Negative    Nitrite Negative Negative    Leukocyte Esterase Negative Negative    Occult Blood Large (A) Negative    Micro Urine Req Microscopic     Culture Indicated No UA Culture   BETA-HCG QUALITATIVE URINE   Result Value Ref Range    Beta-Hcg Urine Negative Negative   REFRACTOMETER SG   Result Value Ref Range    Specific Gravity 1.017    URINE MICROSCOPIC (W/UA)   Result Value Ref Range "    WBC 0-2 /hpf    RBC  (A) /hpf    Bacteria Few (A) None /hpf    Epithelial Cells Few Few /hpf    Mucous Threads Few /hpf       RADIOLOGY  US-RENAL   Final Result      1.  No hydronephrosis.   2.  Possible small LEFT kidney stones.            COURSE & MEDICAL DECISION MAKING  I have reviewed any medical record information, laboratory studies and radiographic results as noted above.  Differential diagnoses includes: Ureterolithiasis, pyelonephritis, pregnancy    Encounter Summary: This is a 32 y.o. female with hematuria, dysuria and flank pain, many evaluations for the same in the past and lifetime here in this hospital system she has had greater than 30 CT scans of her abdomen and pelvis. She has a somewhat tender abdomen on exam but no evidence of peritonitis. Her urinalysis reveals blood but really nothing else with 0-2 WBCs and a few bacteria. At this point it seems likely that she could have a kidney stone although given the numerous negative studies in the past I will not be repeating a CT scan at this time. I will obtain an ultrasound to evaluate for the possibility of obstructive uropathy by evaluating for hydronephrosis. In the absence of any hydronephrosis I will treat her with naproxen. I will not prescribe her any controlled medications and I will go over her narcotic database with her since she understands the reasoning. Additionally, I will not treat her with antibiotics either given the lack of WBCs or overwhelming bacteria on the microscopy, of note despite her multiple presentations for similar symptoms, she has not had a positive urine culture in the past 6 years. So I will not treat her with antibiotics. ------- ultrasound is unremarkable other than possible small kidney stones but looking back at her most recent 8 CT scans was no mention of intrarenal stones this point I doubt this finding. She looks comfortable, I will prescribe her naproxen, and a long discussion with her regarding her  many CAT scans as well as obtaining pain medicine from only 1 provider, she expressed understanding and has been discharged home in stable condition with strict return instructions.      DISPOSITION: Discharge home in stable condition      FINAL IMPRESSION  1. Left flank pain    2. Hematuria, unspecified type           This dictation was created using voice recognition software. The accuracy of the dictation is limited to the abilities of the software. I expect there may be some errors of grammar and possibly content. The nursing notes were reviewed and certain aspects of this information were incorporated into this note.    Electronically signed by: Chadd Carter, 11/7/2017 8:22 PM

## 2017-11-08 NOTE — ED NOTES
"Chief Complaint   Patient presents with   • Painful Urination     Pt c/o painful urination x 5 days   • Blood in Urine     Pt c/o hematuria that began today   • Back Pain     Pt c/o mid back pain \"around kidney's\"     /74   Pulse 72   Temp 36.1 °C (97 °F)   Resp 18   Ht 1.6 m (5' 3\")   Wt 100.3 kg (221 lb 1.9 oz)   SpO2 96%   BMI 39.17 kg/m²     "

## 2018-03-30 ENCOUNTER — OFFICE VISIT (OUTPATIENT)
Dept: MEDICAL GROUP | Facility: MEDICAL CENTER | Age: 33
End: 2018-03-30
Payer: COMMERCIAL

## 2018-03-30 VITALS
SYSTOLIC BLOOD PRESSURE: 134 MMHG | RESPIRATION RATE: 12 BRPM | DIASTOLIC BLOOD PRESSURE: 80 MMHG | WEIGHT: 196.4 LBS | HEIGHT: 65 IN | OXYGEN SATURATION: 94 % | HEART RATE: 70 BPM | BODY MASS INDEX: 32.72 KG/M2 | TEMPERATURE: 97.7 F

## 2018-03-30 DIAGNOSIS — J45.20 MILD INTERMITTENT ASTHMA IN ADULT WITHOUT COMPLICATION: ICD-10-CM

## 2018-03-30 DIAGNOSIS — B00.9 HSV-2 (HERPES SIMPLEX VIRUS 2) INFECTION: ICD-10-CM

## 2018-03-30 DIAGNOSIS — R11.2 NON-INTRACTABLE VOMITING WITH NAUSEA, UNSPECIFIED VOMITING TYPE: ICD-10-CM

## 2018-03-30 DIAGNOSIS — F11.20 NARCOTIC DEPENDENCE (HCC): ICD-10-CM

## 2018-03-30 DIAGNOSIS — N80.9 ENDOMETRIOSIS: ICD-10-CM

## 2018-03-30 PROCEDURE — 99204 OFFICE O/P NEW MOD 45 MIN: CPT | Performed by: NURSE PRACTITIONER

## 2018-03-30 RX ORDER — ONDANSETRON 4 MG/1
4 TABLET, ORALLY DISINTEGRATING ORAL EVERY 6 HOURS PRN
Status: SHIPPED | COMMUNITY
End: 2018-03-30 | Stop reason: SDUPTHER

## 2018-03-30 RX ORDER — ALBUTEROL SULFATE 90 UG/1
2 AEROSOL, METERED RESPIRATORY (INHALATION) EVERY 6 HOURS PRN
Qty: 8.5 G | Refills: 3 | Status: SHIPPED | OUTPATIENT
Start: 2018-03-30 | End: 2019-01-29

## 2018-03-30 RX ORDER — PROMETHAZINE HYDROCHLORIDE 25 MG/1
25 TABLET ORAL EVERY 6 HOURS PRN
COMMUNITY
End: 2018-07-25 | Stop reason: SDUPTHER

## 2018-03-30 RX ORDER — ONDANSETRON 4 MG/1
4 TABLET, ORALLY DISINTEGRATING ORAL EVERY 6 HOURS PRN
Qty: 30 TAB | Refills: 1 | Status: SHIPPED | OUTPATIENT
Start: 2018-03-30 | End: 2018-06-12 | Stop reason: SDUPTHER

## 2018-03-30 RX ORDER — OMEPRAZOLE 20 MG/1
20 CAPSULE, DELAYED RELEASE ORAL DAILY
Qty: 30 CAP | Refills: 2 | Status: SHIPPED | OUTPATIENT
Start: 2018-03-30 | End: 2018-10-22

## 2018-03-30 ASSESSMENT — PATIENT HEALTH QUESTIONNAIRE - PHQ9: CLINICAL INTERPRETATION OF PHQ2 SCORE: 0

## 2018-03-30 NOTE — ASSESSMENT & PLAN NOTE
Daily, wakes up nauseated   Episodes of emesis about every 3 months which can last several hours at a time, misses work   Has chronic abd pain but worse during episodes  Has had appy and jean  h/o C-diff. No recent diarrhea  Has not seen GI  occ heart burn but not daily.

## 2018-03-30 NOTE — PROGRESS NOTES
Chief Complaint   Patient presents with   • Establish Care   • Nausea   • Emesis     Lizz Storey is a 32 y.o. female here to establish care, she had previously seen Dr. Jimenez. She is single, currently lives with her mother. We discussed:  Endometriosis  Multiple ER visits  Now on lupron  Followed by ob/gy    Narcotic dependence  H/o prescription opiate abuse  Now on methadone daily and [facility]      Asthma in adult without complication  Managing with albuterol- typically needs it 3 days a week or so  Has nebulizer but does not generally use it    Nausea and vomiting  Daily, wakes up nauseated   Episodes of emesis about every 3 months which can last several hours at a time, misses work   Has chronic abd pain but worse during episodes  Has had appy and jean  h/o C-diff. No recent diarrhea  Has not seen GI  occ heart burn but not daily.    HSV-2 (herpes simplex virus 2) infection  Suppressive therapy with acyclovir which is working well for her, no recent outbreak    Current medicines (including changes today)  Current Outpatient Prescriptions   Medication Sig Dispense Refill   • omeprazole (PRILOSEC) 20 MG delayed-release capsule Take 1 Cap by mouth every day. 30 Cap 2   • ondansetron (ZOFRAN ODT) 4 MG TABLET DISPERSIBLE Take 1 Tab by mouth every 6 hours as needed for Nausea. 30 Tab 1   • albuterol 108 (90 Base) MCG/ACT Aero Soln inhalation aerosol Inhale 2 Puffs by mouth every 6 hours as needed for Shortness of Breath. 8.5 g 3   • acyclovir (ZOVIRAX) 400 MG tablet Take 400 mg by mouth every day.     • promethazine (PHENERGAN) 25 MG Tab Take 25 mg by mouth every 6 hours as needed for Nausea/Vomiting.     • ipratropium-albuterol (DUONEB) 0.5-2.5 (3) MG/3ML nebulizer solution 3 mL by Nebulization route every four hours as needed for Shortness of Breath. 30 Bullet 0   • albuterol 108 (90 BASE) MCG/ACT Aero Soln inhalation aerosol Inhale 2 Puffs by mouth every 6 hours as needed for Shortness of  "Breath. 1 Inhaler 0   • leuprolide (LUPRON PEDIATRIC) 11.25 MG Kit 11.25 mg by Intramuscular route every 90 days.       No current facility-administered medications for this visit.      She  has a past medical history of ASTHMA; Cyst of ovary, left; Endometriosis; Fibroid, uterus; Genital herpes in women; HPV in female; Kidney infection; and Psychiatric disorder. She also has no past medical history of CAD (coronary artery disease); Cancer (CMS-HCC); Congestive heart failure (CMS-HCC); COPD; Diabetes; Hypertension; Liver disease; Seizure disorder (CMS-[facility]); or Stroke (CMS-HCC).  She  has a past surgical history that includes jean by laparoscopy; laparoscopy; other abdominal surgery; and appendectomy.  Social History   Substance Use Topics   • Smoking status: Former Smoker     Packs/day: 0.25     Years: 15.00   • Smokeless tobacco: Never Used      Comment: 1 year    • Alcohol use No      Comment: 2 times per year     Social History     Social History Narrative   • No narrative on file     No family history on file.  No family status information on file.         ROS  Problems listed discussed above, all other systems reviewed and negative     Objective:     Blood pressure 134/80, pulse 70, temperature 36.5 °C (97.7 °F), resp. rate 12, height 1.65 m (5' 4.96\"), weight 89.1 kg (196 lb 6.4 oz), SpO2 94 %, not currently breastfeeding. Body mass index is 32.72 kg/m².  Physical Exam:  General: Alert, oriented in no acute distress.  Eye contact is good, speech is normal, affect calm  HEENT: Oral mucosa pink moist, no lesions. Nares patent. TMs gray with good landmarks bilaterally. No cervical or supraclavicular lymphadenopathy, thyroid isthmus palpable without masses or nodules.  Lungs: clear to auscultation bilaterally, good aeration, normal effort. No wheeze/ rhonchi/ rales.  CV: regular rate and rhythm, S1, S2. No murmur, no edema. Pedal pulses 2 + bilaterally  Abdomen: soft, nontender, BS x4, no " hepatosplenomegaly.  Ext: color normal, vascularity normal, temperature normal. No rash or lesions.  MS:  No joint swelling or redness. Strength is 5/5 globally  Neuro: DTR 2+ bilaterally  Assessment and Plan:   The following treatment plan was discussed   1. Endometriosis  On Lupron and followed by OB/GYN    2. Narcotic dependence (CMS-HCC)  On methadone, [facility]    3. Non-intractable vomiting with nausea, unspecified vomiting type  Daily nausea, occasional vomiting. Will have her start trial of Prilosec. Refill of Zofran provided. If not improved she will need consultation with gastroenterology, referral placed  omeprazole (PRILOSEC) 20 MG delayed-release capsule    REFERRAL TO GASTROENTEROLOGY    ondansetron (ZOFRAN ODT) 4 MG TABLET DISPERSIBLE   4. Mild intermittent asthma in adult without complication  Stable  albuterol 108 (90 Base) MCG/ACT Aero Soln inhalation aerosol   5. HSV-2 (herpes simplex virus 2) infection  Stable        Educated in proper administration of medication(s) ordered today including safety, possible SE, risks, benefits, rationale and alternatives to therapy.     Followup: One month             Please note that this dictation was created using voice recognition software. I have worked with consultants from the vendor as well as technical experts from [facility] [employer] to optimize the interface. I have made every reasonable attempt to correct obvious errors, but I expect that there are errors of grammar and possibly content that I did not discover before finalizing the note.

## 2018-03-30 NOTE — ASSESSMENT & PLAN NOTE
Managing with albuterol- typically needs it 3 days a week or so  Has nebulizer but does not generally use it

## 2018-04-11 ENCOUNTER — PATIENT MESSAGE (OUTPATIENT)
Dept: MEDICAL GROUP | Facility: MEDICAL CENTER | Age: 33
End: 2018-04-11

## 2018-04-11 DIAGNOSIS — J45.20 MILD INTERMITTENT ASTHMA IN ADULT WITHOUT COMPLICATION: ICD-10-CM

## 2018-04-11 RX ORDER — BUDESONIDE AND FORMOTEROL FUMARATE DIHYDRATE 80; 4.5 UG/1; UG/1
2 AEROSOL RESPIRATORY (INHALATION) 2 TIMES DAILY
Qty: 1 INHALER | Refills: 5 | Status: SHIPPED | OUTPATIENT
Start: 2018-04-11 | End: 2020-05-27 | Stop reason: SDUPTHER

## 2018-04-11 NOTE — TELEPHONE ENCOUNTER
From: Lizz Storey  To: KIRAN Yuen  Sent: 4/11/2018 3:31 PM PDT  Subject: Prescription Question    80/4.5  ----- Message -----  From: KIRAN Yuen  Sent: 4/11/2018 2:15 PM PDT  To: Lizz Storey  Subject: RE: Prescription Question  What dose of symbicort are you using?    ----- Message -----   From: Lizz Storey   Sent: 4/11/2018 1:41 PM PDT   To: KIRAN Yuen  Subject: RE: Prescription Question    again, as needed in jasmine emergencies... when vistited the hospital ... i told this to the 1st nurse.....  ----- Message -----  From: KIRAN Yuen  Sent: 4/11/2018 12:00 PM PDT  To: Lziz Storey  Subject: RE: Prescription Question  We also did not talk about prednisone tabs, can you explain that?  Thank you.    ----- Message -----   From: Lizz Storey   Sent: 4/11/2018 11:57 AM PDT   To: KIRAN Yuen  Subject: RE: Prescription Question    Yea i use it as needed and have been on it every now and then over 8 yrs, yes it does work for me and i havent been on anything else since i was able to get them as samples. if there is anything else close to this im ok with trying, im just getting more weezing with the change of seasons....  albuterol or ventolin  duoneb for nebulizer  albuterol for nebulizer  prednisone tabs  and cymbacort inhaler  ----- Message -----  From: KIRAN Yuen  Sent: 4/11/2018 11:16 AM PDT  To: Lizz Storey  Subject: RE: Prescription Question  Samuel Zamudio,  When talked at your appointment you reported using albuterol, I was not aware that you are on Symbicort as well. How long have you been on this? What is your dose? Have you tried other inhalers before they gave you this sample? (sometimes this medication requires a prior authorization for your insurance to cover it and we need to know what you have tried but did not work.)  Sharon LUIS      ----- Message -----   From: Lizz Storey   Sent: 4/11/2018 10:46 AM PDT   To: Poly  KIRAN Nettles  Subject: Prescription Question    i was wondering if i could possibly get a Cymbocort (?) inhaler called into my [facility] pharmacy? Im getting alittlew weezyer than normal and am out of my sample product.

## 2018-04-11 NOTE — TELEPHONE ENCOUNTER
From: Lizz Storey  To: KIRAN Yuen  Sent: 4/11/2018 10:46 AM PDT  Subject: Prescription Question    i was wondering if i could possibly get a Cymbocort (?) inhaler called into my [facility] pharmacy? Im getting alittlew weezyer than normal and am out of my sample product.

## 2018-04-12 ENCOUNTER — PATIENT MESSAGE (OUTPATIENT)
Dept: MEDICAL GROUP | Facility: MEDICAL CENTER | Age: 33
End: 2018-04-12

## 2018-04-13 NOTE — TELEPHONE ENCOUNTER
From: Lizz Storey  To: KIRAN Yuen  Sent: 4/12/2018 8:30 AM PDT  Subject: Prescription Question    thank you very much!!! it looks like they did because i recived a text from [facility] pharmacy saying that i had one ready and was $30! im pretty sure its usually alot more expensive...    again THANK YOU!  ----- Message -----  From: KIRAN Yuen  Sent: 4/11/2018 3:46 PM PDT  To: Lizz Storey  Subject: RE: Prescription Question  I will try sending it in to your pharmacy, we will need to see if your insurance will cover it.  Sharon LUIS      ----- Message -----   From: Lizz Storey   Sent: 4/11/2018 3:31 PM PDT   To: KIRAN Yuen  Subject: Prescription Question    80/4.5  ----- Message -----  From: KIRAN Yuen  Sent: 4/11/2018 2:15 PM PDT  To: Lizz Storey  Subject: RE: Prescription Question  What dose of symbicort are you using?    ----- Message -----   From: Lizz Storey   Sent: 4/11/2018 1:41 PM PDT   To: KIRAN Yuen  Subject: RE: Prescription Question    again, as needed in jasmine emergencies... when vistited the hospital ... i told this to the 1st nurse.....  ----- Message -----  From: KIRAN Yuen  Sent: 4/11/2018 12:00 PM PDT  To: Lizz Storey  Subject: RE: Prescription Question  We also did not talk about prednisone tabs, can you explain that?  Thank you.    ----- Message -----   From: Lizz Storey   Sent: 4/11/2018 11:57 AM PDT   To: KIRAN Yuen  Subject: RE: Prescription Question    Yea i use it as needed and have been on it every now and then over 8 yrs, yes it does work for me and i havent been on anything else since i was able to get them as samples. if there is anything else close to this im ok with trying, im just getting more weezing with the change of seasons....  albuterol or ventolin  duoneb for nebulizer  albuterol for nebulizer  prednisone tabs  and cymbacort inhaler  ----- Message -----  From: Poly HAN  KIRAN Mcdowell  Sent: 4/11/2018 11:16 AM PDT  To: Lizz Storey  Subject: RE: Prescription Question  Samuel Zamudio,  When talked at your appointment you reported using albuterol, I was not aware that you are on Symbicort as well. How long have you been on this? What is your dose? Have you tried other inhalers before they gave you this sample? (sometimes this medication requires a prior authorization for your insurance to cover it and we need to know what you have tried but did not work.)  Sharon LUIS      ----- Message -----   From: Lizz Storey   Sent: 4/11/2018 10:46 AM PDT   To: KIRAN Yuen  Subject: Prescription Question    i was wondering if i could possibly get a Cymbocort (?) inhaler called into my [facility] pharmacy? Im getting alittlew weezyer than normal and am out of my sample product.

## 2018-04-19 ENCOUNTER — PATIENT MESSAGE (OUTPATIENT)
Dept: MEDICAL GROUP | Facility: MEDICAL CENTER | Age: 33
End: 2018-04-19

## 2018-04-19 DIAGNOSIS — R11.0 DAILY NAUSEA: ICD-10-CM

## 2018-04-19 NOTE — TELEPHONE ENCOUNTER
From: Lizz Storey  To: KIRAN Yuen  Sent: 4/19/2018 10:05 AM PDT  Subject: Non-Urgent Medical Question    Just keeping my Dr updated , but we had discussed my nausea episodes and i wanted to inform her that i had another one this am, took my zofran and still didn't curve it. have been taking the antacid as prescribed and its still happening. its uncomfortable, hurts, cold sweats then hot i dont understand why!

## 2018-05-09 ENCOUNTER — OFFICE VISIT (OUTPATIENT)
Dept: MEDICAL GROUP | Facility: MEDICAL CENTER | Age: 33
End: 2018-05-09
Payer: COMMERCIAL

## 2018-05-09 VITALS
HEART RATE: 60 BPM | RESPIRATION RATE: 16 BRPM | WEIGHT: 194.2 LBS | HEIGHT: 65 IN | DIASTOLIC BLOOD PRESSURE: 60 MMHG | SYSTOLIC BLOOD PRESSURE: 108 MMHG | OXYGEN SATURATION: 94 % | TEMPERATURE: 98.1 F | BODY MASS INDEX: 32.36 KG/M2

## 2018-05-09 DIAGNOSIS — J45.20 MILD INTERMITTENT ASTHMA IN ADULT WITHOUT COMPLICATION: ICD-10-CM

## 2018-05-09 DIAGNOSIS — R11.2 NON-INTRACTABLE VOMITING WITH NAUSEA, UNSPECIFIED VOMITING TYPE: ICD-10-CM

## 2018-05-09 DIAGNOSIS — Z23 NEED FOR VACCINATION: ICD-10-CM

## 2018-05-09 DIAGNOSIS — Z00.00 ANNUAL PHYSICAL EXAM: ICD-10-CM

## 2018-05-09 DIAGNOSIS — F11.20 NARCOTIC DEPENDENCE (HCC): ICD-10-CM

## 2018-05-09 DIAGNOSIS — E66.9 OBESITY (BMI 30-39.9): ICD-10-CM

## 2018-05-09 PROCEDURE — 90715 TDAP VACCINE 7 YRS/> IM: CPT | Performed by: NURSE PRACTITIONER

## 2018-05-09 PROCEDURE — 99395 PREV VISIT EST AGE 18-39: CPT | Mod: 25 | Performed by: NURSE PRACTITIONER

## 2018-05-09 PROCEDURE — 90471 IMMUNIZATION ADMIN: CPT | Performed by: NURSE PRACTITIONER

## 2018-05-09 NOTE — PROGRESS NOTES
Chief Complaint   Patient presents with   • Annual Exam     Lizz Storey is a 33 y.o. female here for well exam, she has a form for her insurance. She is up-to-date on Pap smear and followed by an OB/GYN. We discussed:    Asthma in adult without complication  Stable with occasional use of albuterol. Denies chronic cough or shortness of breath    Narcotic dependence  History of polysubstance abuse, now on methadone managed by [facility]. She thinks this may be contributing to her chronic nausea and vomiting, she is considering weaning down on dose    Nausea and vomiting  Chronic problem, recently evaluated by GI. Notes reviewed, pending further testing and labs    Current medicines (including changes today)  Current Outpatient Prescriptions   Medication Sig Dispense Refill   • budesonide-formoterol (SYMBICORT) 80-4.5 MCG/ACT Aerosol Inhale 2 Puffs by mouth 2 Times a Day. 1 Inhaler 5   • omeprazole (PRILOSEC) 20 MG delayed-release capsule Take 1 Cap by mouth every day. 30 Cap 2   • ondansetron (ZOFRAN ODT) 4 MG TABLET DISPERSIBLE Take 1 Tab by mouth every 6 hours as needed for Nausea. 30 Tab 1   • acyclovir (ZOVIRAX) 400 MG tablet Take 400 mg by mouth every day.     • promethazine (PHENERGAN) 25 MG Tab Take 25 mg by mouth every 6 hours as needed for Nausea/Vomiting.     • albuterol 108 (90 Base) MCG/ACT Aero Soln inhalation aerosol Inhale 2 Puffs by mouth every 6 hours as needed for Shortness of Breath. 8.5 g 3   • ipratropium-albuterol (DUONEB) 0.5-2.5 (3) MG/3ML nebulizer solution 3 mL by Nebulization route every four hours as needed for Shortness of Breath. 30 Bullet 0   • albuterol 108 (90 BASE) MCG/ACT Aero Soln inhalation aerosol Inhale 2 Puffs by mouth every 6 hours as needed for Shortness of Breath. 1 Inhaler 0   • leuprolide (LUPRON PEDIATRIC) 11.25 MG Kit 11.25 mg by Intramuscular route every 90 days.       No current facility-administered medications for this visit.      She  has a past medical  "history of ASTHMA; Cyst of ovary, left; Endometriosis; Fibroid, uterus; Genital herpes in women; HPV in female; Kidney infection; and Psychiatric disorder. She also has no past medical history of CAD (coronary artery disease); Cancer (HCC); Congestive heart failure (HCC); COPD; Diabetes; Hypertension; Liver disease; Seizure disorder (HCC); or Stroke (HCC).  She  has a past surgical history that includes jean by laparoscopy; laparoscopy; other abdominal surgery; and appendectomy.  Social History   Substance Use Topics   • Smoking status: Former Smoker     Packs/day: 0.25     Years: 15.00   • Smokeless tobacco: Never Used      Comment: 1 year    • Alcohol use No      Comment: 2 times per year     Social History     Social History Narrative   • No narrative on file     No family history on file.  No family status information on file.         ROS  Problems listed discussed above, all other systems reviewed and negative     Objective:     Blood pressure 108/60, pulse 60, temperature 36.7 °C (98.1 °F), resp. rate 16, height 1.65 m (5' 4.96\"), weight 88.1 kg (194 lb 3.2 oz), SpO2 94 %, not currently breastfeeding. Body mass index is 32.36 kg/m².  Physical Exam:  General: Alert, oriented in no acute distress.  Eye contact is good, speech is normal, affect calm  HEENT: NCAT, EOMI, perrl, Oral mucosa pink moist, no lesions. Nares patent. TMs gray with good landmarks bilaterally. No cervical or supraclavicular lymphadenopathy, thyroid isthmus palpable without masses or nodules.  Lungs: clear to auscultation bilaterally, good aeration, normal effort. No wheeze/ rhonchi/ rales.  CV: regular rate and rhythm, S1, S2. No murmur, no JVD, no edema. Pedal pulses 2 + bilaterally  Abdomen: soft, nontender, BS x4, no hepatosplenomegaly.  Ext: color normal, vascularity normal, temperature normal. No rash or lesions.  MS: No joint swelling or redness. Strength is 5/5 globally  Neuro: DTR 2+ bilaterally  Assessment and Plan:   The " following treatment plan was discussed  1. Annual physical exam  Normal physical exam. General health and wellness discussion including healthy diet, regular exercise. 2000 iu Vit d3 advised daily. Up-to-date on Pap smear. Advised regular dental cleanings, eye exam yearly.   2. Non-intractable vomiting with nausea, unspecified vomiting type  Continue follow-up with gastroenterology    3. Narcotic dependence (HCC)  She is managed by [facility]    4. Mild intermittent asthma in adult without complication  Stable    5. Need for vaccination  I have placed the below orders and discussed them with an approved delegating provider. The MA is performing the below orders under the direction of Dr. Shaver  Tdap =>6yo IM   6. Obesity (BMI 30-39.9)  Patient identified as having weight management issue.  Appropriate orders and counseling given.       Followup: 6 months, sooner as needed             Please note that this dictation was created using voice recognition software. I have worked with consultants from the vendor as well as technical experts from [employer] [employer] to optimize the interface. I have made every reasonable attempt to correct obvious errors, but I expect that there are errors of grammar and possibly content that I did not discover before finalizing the note.

## 2018-05-09 NOTE — ASSESSMENT & PLAN NOTE
History of polysubstance abuse, now on methadone managed by [facility]. She thinks this may be contributing to her chronic nausea and vomiting, she is considering weaning down on dose

## 2018-06-06 ENCOUNTER — PATIENT MESSAGE (OUTPATIENT)
Dept: MEDICAL GROUP | Facility: MEDICAL CENTER | Age: 33
End: 2018-06-06

## 2018-06-06 NOTE — TELEPHONE ENCOUNTER
From: Lizz Storey  To: KIRAN Yuen  Sent: 6/6/2018 11:57 AM PDT  Subject: Non-Urgent Medical Question    Hi Dr Mcdowell.. i just wanted and needed to email and let you know that i am having ALOT of pain in my lower belly and its hurting when i urinate let alone i have some blood.. it came on suddenly and i thought my pain was just cramping since i had gotten my Lupron shot 2 wks ago, its been going on since friday where i had a 101.3 fever and had to call in to work.. my mom and i just figured it was a flu since sunday morn the fever was gone.. Monday night is when the pain came.. having a high tolerance i just khang waited it out but now... Im being sent home and am curled up in the fetal position..ifg you could please get back to me that would be great! i havent had pain like this in these areas for a good 1-1 1/2 yrs!

## 2018-06-11 ENCOUNTER — HOSPITAL ENCOUNTER (OUTPATIENT)
Dept: RADIOLOGY | Facility: MEDICAL CENTER | Age: 33
End: 2018-06-11
Attending: INTERNAL MEDICINE
Payer: COMMERCIAL

## 2018-06-11 DIAGNOSIS — R10.9 STOMACH ACHE: ICD-10-CM

## 2018-06-11 PROCEDURE — 74245 DX-UPPER GI-SMALL BOWEL FOLLOW THRU: CPT

## 2018-06-11 PROCEDURE — 700112 HCHG RX REV CODE 229: Performed by: INTERNAL MEDICINE

## 2018-06-11 PROCEDURE — A9270 NON-COVERED ITEM OR SERVICE: HCPCS | Performed by: INTERNAL MEDICINE

## 2018-06-11 RX ADMIN — ANTACID/ANTIFLATULENT 1 PACKET: 380; 550; 10; 10 GRANULE, EFFERVESCENT ORAL at 09:15

## 2018-06-12 ENCOUNTER — PATIENT MESSAGE (OUTPATIENT)
Dept: MEDICAL GROUP | Facility: MEDICAL CENTER | Age: 33
End: 2018-06-12

## 2018-06-12 DIAGNOSIS — R11.2 NON-INTRACTABLE VOMITING WITH NAUSEA, UNSPECIFIED VOMITING TYPE: ICD-10-CM

## 2018-06-12 RX ORDER — ONDANSETRON 4 MG/1
4 TABLET, ORALLY DISINTEGRATING ORAL EVERY 6 HOURS PRN
Qty: 30 TAB | Refills: 2 | Status: SHIPPED | OUTPATIENT
Start: 2018-06-12 | End: 2019-01-29

## 2018-06-12 NOTE — TELEPHONE ENCOUNTER
From: Lizz Storey  To: KIRAN Yuen  Sent: 6/12/2018 12:27 PM PDT  Subject: Non-Urgent Medical Question    thank you very much Dr Mcdowell, and i am very much looking forward to seeing you on Friday ...   ----- Message -----  From: KIRAN Yuen  Sent: 6/12/2018 11:25 AM PDT  To: Lizz Storey  Subject: RE: Non-Urgent Medical Question  It's ready at the   Sharon LUIS      ----- Message -----   From: Lizz Storey   Sent: 6/12/2018 10:50 AM PDT   To: KIRAN Yuen  Subject: Non-Urgent Medical Question    yes please... 6/7-6/11... i have returned to work today 6/12!    THANK YOU SO MUCH!!!   ----- Message -----  From: KIRAN Yuen  Sent: 6/12/2018 9:44 AM PDT  To: Lizz Storey  Subject: RE: Non-Urgent Medical Question  So you need a work note from 6/7 through 6/11, correct?  Sharon LUIS      ----- Message -----   From: Lizz Storey   Sent: 6/12/2018 7:49 AM PDT   To: KIRAN Yuen  Subject: Non-Urgent Medical Question    Hi lisa Huitron writing you for 3reasons. 1. Yesterday i had my tests done for ,which took amy & i wasnt able to return to work because i was so sick and in pain from the fluids i had to braulio. 2. Because i was in so much pain & was right next door & when i last spoke with you i was having pain then as well & its just gotten worse, i made an apt w/you for this Fri (hoping i make it to then) but 3. today is my first day back to work (i was very sick & in pain on thurs 6/7 &6/8) that my job is asking for a Drs note.Im pretty sure i can get one from Dr Tamez or the radiology @[facility] but i need one for thurs &fri of last week. i dont know if your able to do it for thurs to today or if i do have to get 2 separate notes..        ----- Message -----  From: KIRAN Yuen  Sent: 6/6/2018 1:05 PM PDT  To: Lizz Storey  Subject: RE: Non-Urgent Medical Question  Lizz,  Sorry to hear you are having a hard  time.  I think you need to be evaluated today. I would recommend you call and ask for an urgent appointment or get into urgent care to be checked out.  Sharon LUIS      ----- Message -----   From: Lizz Storey   Sent: 6/6/2018 11:57 AM PDT   To: KIRAN Yuen  Subject: Non-Urgent Medical Question    Hi Dr Mcdowell.. i just wanted and needed to email and let you know that i am having ALOT of pain in my lower belly and its hurting when i urinate let alone i have some blood.. it came on suddenly and i thought my pain was just cramping since i had gotten my Lupron shot 2 wks ago, its been going on since friday where i had a 101.3 fever and had to call in to work.. my mom and i just figured it was a flu since sunday morn the fever was gone.. Monday night is when the pain came.. having a high tolerance i just khang waited it out but now... Im being sent home and am curled up in the fetal position..ifg you could please get back to me that would be great! i havent had pain like this in these areas for a good 1-1 1/2 yrs!

## 2018-06-12 NOTE — LETTER
June 12, 2018        RE: Lizz Storey    To Whom It May Concern;    Lizz Storey is seen in my office for primary care. Please excuse her absence 6/7/18 through 6/11/18 due to illness.    Please contact me with any questions.    Sincerely,              Poly LUIS

## 2018-06-12 NOTE — TELEPHONE ENCOUNTER
From: Lizz Storey  To: KIRAN Yuen  Sent: 6/12/2018 10:50 AM PDT  Subject: Non-Urgent Medical Question    yes please... 6/7-6/11... i have returned to work today 6/12!    THANK YOU SO MUCH!!!   ----- Message -----  From: KIRAN Yuen  Sent: 6/12/2018 9:44 AM PDT  To: Lizz Storey  Subject: RE: Non-Urgent Medical Question  So you need a work note from 6/7 through 6/11, correct?  Sharon Mcdowell APRN      ----- Message -----   From: Lizz Storey   Sent: 6/12/2018 7:49 AM PDT   To: KIRAN Yuen  Subject: Non-Urgent Medical Question    Hi Dr. Mcdowell im writing you for 3reasons. 1. Yesterday i had my tests done for ,which took amy & i wasnt able to return to work because i was so sick and in pain from the fluids i had to braulio. 2. Because i was in so much pain & was right next door & when i last spoke with you i was having pain then as well & its just gotten worse, i made an apt w/you for this Fri (hoping i make it to then) but 3. today is my first day back to work (i was very sick & in pain on thurs 6/7 &6/8) that my job is asking for a Drs note.Im pretty sure i can get one from Dr Tamez or the radiology @[facility] but i need one for thurs &fri of last week. i dont know if your able to do it for thurs to today or if i do have to get 2 separate notes..        ----- Message -----  From: KIRAN Yuen  Sent: 6/6/2018 1:05 PM PDT  To: Lizz Storey  Subject: RE: Non-Urgent Medical Question  Lizz  Sorry to hear you are having a hard time.  I think you need to be evaluated today. I would recommend you call and ask for an urgent appointment or get into urgent care to be checked out.  Sharon LUIS      ----- Message -----   From: Lizz Storey   Sent: 6/6/2018 11:57 AM PDT   To: KIRAN Yuen  Subject: Non-Urgent Medical Question    Hi Dr Mcdowell.. i just wanted and needed to email and let you know that i am having ALOT of pain in my lower belly and its  hurting when i urinate let alone i have some blood.. it came on suddenly and i thought my pain was just cramping since i had gotten my Lupron shot 2 wks ago, its been going on since friday where i had a 101.3 fever and had to call in to work.. my mom and i just figured it was a flu since sunday morn the fever was gone.. Monday night is when the pain came.. having a high tolerance i just khang waited it out but now... Im being sent home and am curled up in the fetal position..ifg you could please get back to me that would be great! i havent had pain like this in these areas for a good 1-1 1/2 yrs!

## 2018-06-12 NOTE — TELEPHONE ENCOUNTER
From: Lizz Storey  To: KIRAN Yuen  Sent: 6/12/2018 7:49 AM PDT  Subject: Non-Urgent Medical Question    Hi Dr. Mcdowell, im writing you for 3reasons. 1. Yesterday i had my tests done for ,which took amy & i wasnt able to return to work because i was so sick and in pain from the fluids i had to drik. 2. Because i was in so much pain & was right next door & when i last spoke with you i was having pain then as well & its just gotten worse, i made an apt w/you for this Fri (hoping i make it to then) but 3. today is my first day back to work (i was very sick & in pain on thurs 6/7 &6/8) that my job is asking for a Drs note.Im pretty sure i can get one from Dr Tamez or the radiology @[facility] but i need one for thurs &fri of last week. i dont know if your able to do it for thurs to today or if i do have to get 2 separate notes..        ----- Message -----  From: KIRAN Yuen  Sent: 6/6/2018 1:05 PM PDT  To: Lizz Storey  Subject: RE: Non-Urgent Medical Question  Lizz  Sorry to hear you are having a hard time.  I think you need to be evaluated today. I would recommend you call and ask for an urgent appointment or get into urgent care to be checked out.  Sharon LUIS      ----- Message -----   From: Lizz Storey   Sent: 6/6/2018 11:57 AM PDT   To: KIRAN Yuen  Subject: Non-Urgent Medical Question    Samuel Mcdowell.. i just wanted and needed to email and let you know that i am having ALOT of pain in my lower belly and its hurting when i urinate let alone i have some blood.. it came on suddenly and i thought my pain was just cramping since i had gotten my Lupron shot 2 wks ago, its been going on since friday where i had a 101.3 fever and had to call in to work.. my mom and i just figured it was a flu since sunday morn the fever was gone.. Monday night is when the pain came.. having a high tolerance i just khang waited it out but now... Im being sent home and am curled  up in the fetal position..ifg you could please get back to me that would be great! i havent had pain like this in these areas for a good 1-1 1/2 yrs!

## 2018-06-15 ENCOUNTER — HOSPITAL ENCOUNTER (OUTPATIENT)
Dept: LAB | Facility: MEDICAL CENTER | Age: 33
End: 2018-06-15
Attending: NURSE PRACTITIONER
Payer: COMMERCIAL

## 2018-06-15 ENCOUNTER — HOSPITAL ENCOUNTER (OUTPATIENT)
Facility: MEDICAL CENTER | Age: 33
End: 2018-06-15
Attending: NURSE PRACTITIONER
Payer: COMMERCIAL

## 2018-06-15 ENCOUNTER — OFFICE VISIT (OUTPATIENT)
Dept: MEDICAL GROUP | Facility: MEDICAL CENTER | Age: 33
End: 2018-06-15
Payer: COMMERCIAL

## 2018-06-15 VITALS
HEART RATE: 79 BPM | SYSTOLIC BLOOD PRESSURE: 104 MMHG | WEIGHT: 190 LBS | DIASTOLIC BLOOD PRESSURE: 70 MMHG | RESPIRATION RATE: 16 BRPM | BODY MASS INDEX: 31.65 KG/M2 | TEMPERATURE: 97.9 F | OXYGEN SATURATION: 98 % | HEIGHT: 65 IN

## 2018-06-15 DIAGNOSIS — R31.9 HEMATURIA, UNSPECIFIED TYPE: ICD-10-CM

## 2018-06-15 DIAGNOSIS — R30.0 DYSURIA: ICD-10-CM

## 2018-06-15 DIAGNOSIS — R10.9 FLANK PAIN: ICD-10-CM

## 2018-06-15 LAB
ALBUMIN SERPL BCP-MCNC: 4.3 G/DL (ref 3.2–4.9)
ALBUMIN/GLOB SERPL: 1.3 G/DL
ALP SERPL-CCNC: 125 U/L (ref 30–99)
ALT SERPL-CCNC: 29 U/L (ref 2–50)
ANION GAP SERPL CALC-SCNC: 6 MMOL/L (ref 0–11.9)
APPEARANCE UR: NORMAL
AST SERPL-CCNC: 21 U/L (ref 12–45)
BILIRUB SERPL-MCNC: 0.4 MG/DL (ref 0.1–1.5)
BILIRUB UR STRIP-MCNC: NEGATIVE MG/DL
BUN SERPL-MCNC: 8 MG/DL (ref 8–22)
CALCIUM SERPL-MCNC: 9.3 MG/DL (ref 8.5–10.5)
CHLORIDE SERPL-SCNC: 103 MMOL/L (ref 96–112)
CO2 SERPL-SCNC: 29 MMOL/L (ref 20–33)
COLOR UR AUTO: NORMAL
CREAT SERPL-MCNC: 0.82 MG/DL (ref 0.5–1.4)
GLOBULIN SER CALC-MCNC: 3.4 G/DL (ref 1.9–3.5)
GLUCOSE SERPL-MCNC: 73 MG/DL (ref 65–99)
GLUCOSE UR STRIP.AUTO-MCNC: NEGATIVE MG/DL
KETONES UR STRIP.AUTO-MCNC: NEGATIVE MG/DL
LEUKOCYTE ESTERASE UR QL STRIP.AUTO: NEGATIVE
NITRITE UR QL STRIP.AUTO: NEGATIVE
PH UR STRIP.AUTO: 5 [PH] (ref 5–8)
POTASSIUM SERPL-SCNC: 4 MMOL/L (ref 3.6–5.5)
PROT SERPL-MCNC: 7.7 G/DL (ref 6–8.2)
PROT UR QL STRIP: NORMAL MG/DL
RBC UR QL AUTO: NORMAL
SODIUM SERPL-SCNC: 138 MMOL/L (ref 135–145)
SP GR UR STRIP.AUTO: 1.03
UROBILINOGEN UR STRIP-MCNC: NEGATIVE MG/DL

## 2018-06-15 PROCEDURE — 36415 COLL VENOUS BLD VENIPUNCTURE: CPT

## 2018-06-15 PROCEDURE — 81002 URINALYSIS NONAUTO W/O SCOPE: CPT | Performed by: NURSE PRACTITIONER

## 2018-06-15 PROCEDURE — 99214 OFFICE O/P EST MOD 30 MIN: CPT | Performed by: NURSE PRACTITIONER

## 2018-06-15 PROCEDURE — 80053 COMPREHEN METABOLIC PANEL: CPT

## 2018-06-15 PROCEDURE — 87086 URINE CULTURE/COLONY COUNT: CPT

## 2018-06-15 RX ORDER — SULFAMETHOXAZOLE AND TRIMETHOPRIM 800; 160 MG/1; MG/1
1 TABLET ORAL 2 TIMES DAILY
Qty: 10 TAB | Refills: 0 | Status: SHIPPED | OUTPATIENT
Start: 2018-06-15 | End: 2018-10-22

## 2018-06-15 RX ORDER — OXYCODONE HYDROCHLORIDE AND ACETAMINOPHEN 5; 325 MG/1; MG/1
1 TABLET ORAL EVERY 8 HOURS PRN
Qty: 10 TAB | Refills: 0 | Status: SHIPPED | OUTPATIENT
Start: 2018-06-15 | End: 2018-06-20 | Stop reason: SDUPTHER

## 2018-06-15 NOTE — PROGRESS NOTES
Subjective:     Chief Complaint   Patient presents with   • Other     Blood in urine   • Back Pain   • Follow-Up     CT results     Lizz Storey is a 33 y.o. female here for evaluation of acute flank pain and hematuria. She initially noticed hematuria last night, it's progressively worsened. This morning she woke up with severe right flank pain, 8 out of 10, comes and goes. She is having dysuria and suprapubic pressure. Some nausea, but this is a chronic issue. Last year she had an ultrasound that showed possible left renal calculi. She denies fever, chills, vomiting. She is alternating 800 mg of ibuprofen and Tylenol with only slight relief. She does have history of polysubstance abuse, has been on methadone long-term and working on weaning her dose down.      Current medicines (including changes today)  Current Outpatient Prescriptions   Medication Sig Dispense Refill   • sulfamethoxazole-trimethoprim (BACTRIM DS) 800-160 MG tablet Take 1 Tab by mouth 2 times a day. 10 Tab 0   • oxyCODONE-acetaminophen (PERCOCET) 5-325 MG Tab Take 1 Tab by mouth every 8 hours as needed for up to 3 days. 10 Tab 0   • ondansetron (ZOFRAN ODT) 4 MG TABLET DISPERSIBLE Take 1 Tab by mouth every 6 hours as needed for Nausea. 30 Tab 2   • budesonide-formoterol (SYMBICORT) 80-4.5 MCG/ACT Aerosol Inhale 2 Puffs by mouth 2 Times a Day. 1 Inhaler 5   • ipratropium-albuterol (DUONEB) 0.5-2.5 (3) MG/3ML nebulizer solution 3 mL by Nebulization route every four hours as needed for Shortness of Breath. 30 Bullet 0   • leuprolide (LUPRON PEDIATRIC) 11.25 MG Kit 11.25 mg by Intramuscular route every 90 days.     • acyclovir (ZOVIRAX) 400 MG tablet Take 400 mg by mouth every day.     • promethazine (PHENERGAN) 25 MG Tab Take 25 mg by mouth every 6 hours as needed for Nausea/Vomiting.     • omeprazole (PRILOSEC) 20 MG delayed-release capsule Take 1 Cap by mouth every day. 30 Cap 2   • albuterol 108 (90 Base) MCG/ACT Aero Soln inhalation  "aerosol Inhale 2 Puffs by mouth every 6 hours as needed for Shortness of Breath. 8.5 g 3   • albuterol 108 (90 BASE) MCG/ACT Aero Soln inhalation aerosol Inhale 2 Puffs by mouth every 6 hours as needed for Shortness of Breath. 1 Inhaler 0     No current facility-administered medications for this visit.      She  has a past medical history of ASTHMA; Cyst of ovary, left; Endometriosis; Fibroid, uterus; Genital herpes in women; HPV in female; Kidney infection; and Psychiatric disorder. She also has no past medical history of CAD (coronary artery disease); Cancer (HCC); Congestive heart failure (HCC); COPD; Diabetes; Hypertension; Liver disease; Seizure disorder (HCC); or Stroke (HCC).    ROS included above     Objective:     Blood pressure 104/70, pulse 79, temperature 36.6 °C (97.9 °F), resp. rate 16, height 1.65 m (5' 4.96\"), weight 86.2 kg (190 lb), SpO2 98 %. Body mass index is 31.66 kg/m².     Physical Exam:  General: Alert, oriented in no acute distress.  Eye contact is good, speech is normal, tearful   Lungs: clear to auscultation bilaterally, normal effort, no wheeze/ rhonchi/ rales.  CV: regular rate and rhythm, S1, S2, no murmur  Abdomen: soft, suprapubic tenderness, right CVAT. No distention  Ext: no edema, color normal, vascularity normal, temperature normal    Assessment and Plan:   The following treatment plan was discussed  1. Dysuria  UA in the office positive for blood, negative for leukocytes and nitrates. Discussed infection versus renal calculi. Urine sent for culture, labs and CT as listed below. Will go ahead and start Bactrim. Push fluids.  With her history of substance abuse I am very cautious about giving her narcotics. She is, however, quite visibly in pain. I will provide her with 10 tabs of Percocet to use sparingly, her mother will help her manage the medication and keep track of it. She is notified there will be no refill. Opiate consent form reviewed and signed  URINE CULTURE(NEW)    " COMP METABOLIC PANEL    POCT Urinalysis    sulfamethoxazole-trimethoprim (BACTRIM DS) 800-160 MG tablet   2. Flank pain  CT-RENAL COLIC EVALUATION(A/P W/O)    oxyCODONE-acetaminophen (PERCOCET) 5-325 MG Tab    CONSENT FOR OPIATE PRESCRIPTION   3. Hematuria, unspecified type         Followup: Pending tests         Please note that this dictation was created using voice recognition software. I have worked with consultants from the vendor as well as technical experts from [facility] to optimize the interface. I have made every reasonable attempt to correct obvious errors, but I expect that there are errors of grammar and possibly content that I did not discover before finalizing the note.

## 2018-06-15 NOTE — LETTER
Lola 15, 2018        RE: Lizz Storey    To Whom It May Concern;    Lizz Storey was seen today in my office for a medical appointment. Please excuse her work absence this afternoon due to illness.    Please contact me with any questions.    Sincerely,              Poly LUIS

## 2018-06-16 DIAGNOSIS — R30.0 DYSURIA: ICD-10-CM

## 2018-06-17 ENCOUNTER — PATIENT MESSAGE (OUTPATIENT)
Dept: MEDICAL GROUP | Facility: MEDICAL CENTER | Age: 33
End: 2018-06-17

## 2018-06-18 ENCOUNTER — TELEPHONE (OUTPATIENT)
Dept: MEDICAL GROUP | Facility: MEDICAL CENTER | Age: 33
End: 2018-06-18

## 2018-06-18 LAB
BACTERIA UR CULT: NORMAL
SIGNIFICANT IND 70042: NORMAL
SITE SITE: NORMAL
SOURCE SOURCE: NORMAL

## 2018-06-18 NOTE — TELEPHONE ENCOUNTER
----- Message from KIRAN Yuen sent at 6/17/2018  7:05 PM PDT -----  Please inform pt labs are generally normal. Urine culture shows skin bacteria which is generally a contaminant rather than true infection. Is she scheduled for CT?

## 2018-06-19 ENCOUNTER — PATIENT MESSAGE (OUTPATIENT)
Dept: MEDICAL GROUP | Facility: MEDICAL CENTER | Age: 33
End: 2018-06-19

## 2018-06-19 DIAGNOSIS — R31.9 HEMATURIA, UNSPECIFIED TYPE: ICD-10-CM

## 2018-06-19 DIAGNOSIS — R10.9 FLANK PAIN: ICD-10-CM

## 2018-06-19 NOTE — TELEPHONE ENCOUNTER
"From: Lizz Storey  To: KIRAN Yuen  Sent: 6/19/2018 2:36 PM PDT  Subject: Non-Urgent Medical Question    OK.... grrrrrr... so i dont know what to do!! i called imaging to schedule my ultrasound and.... due to my GREAT insurance, I AGAIN have to wait 7 days as a stipulation of my insurance- even though you put \"urgent\" ... WHAT DO I DO??... im in pain in my back but only have 2 pills left for pain. im still taking the bactrim, and kept the ct scan since im not sure which one you prefer since its the same issue . my mom thinks its a stone june she hears me when in the restroom & shes had them & says what i describe is what she had... please, let me know what to do which apt to keep/make.. and again thank you for all your help!    ----- Message -----  From: KIRAN Yuen  Sent: 6/19/2018 10:02 AM PDT  To: Lizz Storey  Subject: RE: Non-Urgent Medical Question  Samuel Zamudio,  Your insurance had an unusual requirement that you wait at least 7 days after your appointment to do the CT. I've never seen this happen before. So as an alternative I ordered an ultrasound, I think you will be able to schedule this faster. Please call [phone] and see when you can get the ultrasound appointment. This is what my assistant was calling you about last night.  Sharon LUIS            ----- Message -----   From: Lizz Storey   Sent: 6/19/2018 9:55 AM PDT   To: KIRAN Yuen  Subject: Non-Urgent Medical Question    samuel weaver, i got a message from your ma last night but i was at work.. im here now but may be going home early do to my pain not dying down... i did make my ct scan apt like i stated in the last message but its not till the 27th which is NEXT Wednesday... so..... [facility] and dont know what to do for the pain until then. i have 3 left for the day which my mom has been helping with and me only taking when the other isnt helping... so my main thing is just letting you " know why i havent been able to call you or your ma back....  ----- Message -----  From: KIRAN Yuen  Sent: 6/18/2018 9:27 AM PDT  To: Lizz Storey  Subject: RE: Non-Urgent Medical Question  Your labs and urine test are not showing an infectious cause for your pain. Please get scheduled for the CT as soon as you can so that we can determine if there is a stone causing your symptoms.    ----- Message -----   From: Lizz Storey   Sent: 6/17/2018 11:39 AM PDT   To: KIRAN Yuen  Subject: Non-Urgent Medical Question    Good Morning, sorry to bother you but i wanted to keep you informed on my issue, its now im back AND lower belly CONSTANT, but not so much when i use the bathroom like friday... Pain is same level if not alil hightened but thankfully percocet is helping... I dont know what to do other than what i have, but just [facility] ... Let me know if i need to come back and see you. I have to make my ultrasound esther but i got my blood drawn fri after i left....    Thank you for your help again!!

## 2018-06-19 NOTE — TELEPHONE ENCOUNTER
From: Lizz Storey  To: KIRAN Yuen  Sent: 6/19/2018 9:55 AM PDT  Subject: Non-Urgent Medical Question    hi dr weaver, i got a message from your ma last night but i was at work.. im here now but may be going home early do to my pain not dying down... i did make my ct scan apt like i stated in the last message but its not till the 27th which is NEXT Wednesday... so..... im [facility] and dont know what to do for the pain until then. i have 3 left for the day which my mom has been helping with and me only taking when the other isnt helping... so my main thing is just letting you know why i havent been able to call you or your ma back....  ----- Message -----  From: KIRAN Yuen  Sent: 6/18/2018 9:27 AM PDT  To: Lizz Storey  Subject: RE: Non-Urgent Medical Question  Your labs and urine test are not showing an infectious cause for your pain. Please get scheduled for the CT as soon as you can so that we can determine if there is a stone causing your symptoms.    ----- Message -----   From: Lizz Storey   Sent: 6/17/2018 11:39 AM PDT   To: KIRAN Yuen  Subject: Non-Urgent Medical Question    Good Morning, sorry to bother you but i wanted to keep you informed on my issue, its now im back AND lower belly CONSTANT, but not so much when i use the bathroom like friday... Pain is same level if not alil hightened but thankfully percocet is helping... I dont know what to do other than what i have, but just [facility] ... Let me know if i need to come back and see you. I have to make my ultrasound esther but i got my blood drawn fri after i left....    Thank you for your help again!!

## 2018-06-20 ENCOUNTER — PATIENT MESSAGE (OUTPATIENT)
Dept: MEDICAL GROUP | Facility: MEDICAL CENTER | Age: 33
End: 2018-06-20

## 2018-06-20 DIAGNOSIS — R10.9 FLANK PAIN: ICD-10-CM

## 2018-06-20 RX ORDER — OXYCODONE HYDROCHLORIDE AND ACETAMINOPHEN 5; 325 MG/1; MG/1
1 TABLET ORAL EVERY 8 HOURS PRN
Qty: 10 TAB | Refills: 0 | Status: SHIPPED | OUTPATIENT
Start: 2018-06-20 | End: 2018-07-20 | Stop reason: SDUPTHER

## 2018-06-20 NOTE — TELEPHONE ENCOUNTER
"From: Lizz Storey  To: KIRAN Yuen  Sent: 6/20/2018 10:42 AM PDT  Subject: Non-Urgent Medical Question    good morning Dr. Mcdowell,called imaging &cant get in till Friday@5pm. im completely out of pain meds now(i tried to make last)& still in same pain,if alil more heightened. cant call in to work again or ill lose my job. I know you are very frustrated&im as well i just hope not w/me. im really trying to do everything i can. i honestly think its stones,its more heightened in restroom last day-day 1/2. i have to  on something. ill keep fri apt til i hear from you but may i please get arefill so i can come to work & actually sleep thru the night? other than that, i hope you have a better day than I&again, i do appreciate all youre doing. im frustrated because if i had amerigroup from the [facility] i wouldn't have this problem, but i work.  ----- Message -----  From: KIRAN Yuen  Sent: 6/19/2018 3:06 PM PDT  To: Lizz Storey  Subject: RE: Non-Urgent Medical Question  This is very frustrating. I have changed the order again, please try to call again. I think you'll be able to schedule now.    ----- Message -----   From: Lizz Storey   Sent: 6/19/2018 2:36 PM PDT   To: KIRAN Yuen  Subject: Non-Urgent Medical Question    OK.... grrrrrr... so i dont know what to do!! i called imaging to schedule my ultrasound and.... due to my GREAT insurance, I AGAIN have to wait 7 days as a stipulation of my insurance- even though you put \"urgent\" ... WHAT DO I DO??... im in pain in my back but only have 2 pills left for pain. im still taking the bactrim, and kept the ct scan since im not sure which one you prefer since its the same issue . my mom thinks its a stone june she hears me when in the restroom & shes had them & says what i describe is what she had... please, let me know what to do which apt to keep/make.. and again thank you for all your help!    ----- Message -----  From: " KIRAN Yuen  Sent: 6/19/2018 10:02 AM PDT  To: Lizz Storey  Subject: RE: Non-Urgent Medical Question  Samuel Zamudio,  Your insurance had an unusual requirement that you wait at least 7 days after your appointment to do the CT. I've never seen this happen before. So as an alternative I ordered an ultrasound, I think you will be able to schedule this faster. Please call [phone] and see when you can get the ultrasound appointment. This is what my assistant was calling you about last night.  Sharon LUIS            ----- Message -----   From: Lizz Storey   Sent: 6/19/2018 9:55 AM PDT   To: KIRAN Yuen  Subject: Non-Urgent Medical Question    hi dr weaver, i got a message from your ma last night but i was at work.. im here now but may be going home early do to my pain not dying down... i did make my ct scan apt like i stated in the last message but its not till the 27th which is NEXT Wednesday... so..... [facility] and dont know what to do for the pain until then. i have 3 left for the day which my mom has been helping with and me only taking when the other isnt helping... so my main thing is just letting you know why i havent been able to call you or your ma back....  ----- Message -----  From: KIRAN Yuen  Sent: 6/18/2018 9:27 AM PDT  To: Lizz Storey  Subject: RE: Non-Urgent Medical Question  Your labs and urine test are not showing an infectious cause for your pain. Please get scheduled for the CT as soon as you can so that we can determine if there is a stone causing your symptoms.    ----- Message -----   From: Lizz Storey   Sent: 6/17/2018 11:39 AM PDT   To: KIRAN Yuen  Subject: Non-Urgent Medical Question    Good Morning, sorry to bother you but i wanted to keep you informed on my issue, its now im back AND lower belly CONSTANT, but not so much when i use the bathroom like friday... Pain is same level if not alil hightened but  thankfully percocet is helping... I dont know what to do other than what i have, but just [facility] ... Let me know if i need to come back and see you. I have to make my ultrasound esther but i got my blood drawn fri after i left....    Thank you for your help again!!

## 2018-06-20 NOTE — TELEPHONE ENCOUNTER
From: Lizz Storey  To: KIRAN Yuen  Sent: 6/20/2018 11:54 AM PDT  Subject: Non-Urgent Medical Question    thank you so... much and i most def understand!! i will email you once i do ultrasound.. now do you want me to keep ct scan or just ultrasound?  ----- Message -----  From: KIRAN Yuen  Sent: 6/20/2018 11:00 AM PDT  To: Lizz Storey  Subject: RE: Non-Urgent Medical Question  I'm glad you're able to get in Friday, better than waiting until next week. It does sound like kidney stones, depending on the size we may need to refer you to urology.  I have printed a new prescription for the pain medication which you can  at the . I will not be able to refill again without seeing you in the office for an appointment. Make sure you're drinking a lot of fluids.  Sharon LUIS      ----- Message -----   From: Lizz Storey   Sent: 6/20/2018 10:42 AM PDT   To: KIRAN Yuen  Subject: Non-Urgent Medical Question    good morning Dr. Mcdowell,called imaging &cant get in till Friday@5pm. im completely out of pain meds now(i tried to make last)& still in same pain,if alil more heightened. cant call in to work again or ill lose my job. I know you are very frustrated&im as well i just hope not w/me. im really trying to do everything i can. i honestly think its stones,its more heightened in restroom last day-day 1/2. i have to  on something. ill keep fri apt til i hear from you but may i please get arefill so i can come to work & actually sleep thru the night? other than that, i hope you have a better day than I&again, i do appreciate all youre doing. im frustrated because if i had amerigroup from the state i wouldn't have this problem, but i work.  ----- Message -----  From: KIRAN Yuen  Sent: 6/19/2018 3:06 PM PDT  To: Lizz Storey  Subject: RE: Non-Urgent Medical Question  This is very frustrating. I have changed the order again, please try  "to call again. I think you'll be able to schedule now.    ----- Message -----   From: Lizz Storey   Sent: 6/19/2018 2:36 PM PDT   To: KIRAN Yuen  Subject: Non-Urgent Medical Question    OK.... grrrrrr... so i dont know what to do!! i called imaging to schedule my ultrasound and.... due to my GREAT insurance, I AGAIN have to wait 7 days as a stipulation of my insurance- even though you put \"urgent\" ... WHAT DO I DO??... im in pain in my back but only have 2 pills left for pain. im still taking the bactrim, and kept the ct scan since im not sure which one you prefer since its the same issue . my mom thinks its a stone june she hears me when in the restroom & shes had them & says what i describe is what she had... please, let me know what to do which apt to keep/make.. and again thank you for all your help!    ----- Message -----  From: KIRAN Yuen  Sent: 6/19/2018 10:02 AM PDT  To: Lizz Storey  Subject: RE: Non-Urgent Medical Question  Hi Lizz,  Your insurance had an unusual requirement that you wait at least 7 days after your appointment to do the CT. I've never seen this happen before. So as an alternative I ordered an ultrasound, I think you will be able to schedule this faster. Please call [phone] and see when you can get the ultrasound appointment. This is what my assistant was calling you about last night.  Sharon LUIS            ----- Message -----   From: Lizz Storey   Sent: 6/19/2018 9:55 AM PDT   To: KIRAN Yuen  Subject: Non-Urgent Medical Question    hi dr weaver, i got a message from your ma last night but i was at work.. im here now but may be going home early do to my pain not dying down... i did make my ct scan apt like i stated in the last message but its not till the 27th which is NEXT Wednesday... so..... im [facility] and dont know what to do for the pain until then. i have 3 left for the day which my mom has been helping with and me " only taking when the other isnt helping... so my main thing is just letting you know why i havent been able to call you or your ma back....  ----- Message -----  From: KIRAN Yuen  Sent: 6/18/2018 9:27 AM PDT  To: Lizz Storey  Subject: RE: Non-Urgent Medical Question  Your labs and urine test are not showing an infectious cause for your pain. Please get scheduled for the CT as soon as you can so that we can determine if there is a stone causing your symptoms.    ----- Message -----   From: Lizz Storey   Sent: 6/17/2018 11:39 AM PDT   To: KIRAN Yuen  Subject: Non-Urgent Medical Question    Good Morning, sorry to bother you but i wanted to keep you informed on my issue, its now im back AND lower belly CONSTANT, but not so much when i use the bathroom like friday... Pain is same level if not alil hightened but thankfully percocet is helping... I dont know what to do other than what i have, but just [facility] ... Let me know if i need to come back and see you. I have to make my ultrasound esther but i got my blood drawn fri after i left....    Thank you for your help again!!

## 2018-06-22 ENCOUNTER — TELEPHONE (OUTPATIENT)
Dept: MEDICAL GROUP | Facility: MEDICAL CENTER | Age: 33
End: 2018-06-22

## 2018-06-25 ENCOUNTER — PATIENT MESSAGE (OUTPATIENT)
Dept: MEDICAL GROUP | Facility: MEDICAL CENTER | Age: 33
End: 2018-06-25

## 2018-06-25 NOTE — TELEPHONE ENCOUNTER
From: Lizz Storey  To: KIRAN Yuen  Sent: 6/25/2018 9:32 AM PDT  Subject: Non-Urgent Medical Question    GOOD MORNING OMER GILBERT I WASNT ABLE TO GO TO WORK ON FRI BECAUSE WHATEVER WAS GOING ON WENT TO A HEAD ALL FRIDAY! THANKFULLY SINCE THEN I HAVE NOT BEEN IN PAIN LIKE I WAS.. I AM AT ABOUT A 3 BT THATS NORMAL... BUT BECAUSE OF THE ISSUE FRIDAY I WASNT ABLE TO MAKE MY ULTRASOUND BUT I STILL HAVE MY CT WEDS... WOULD YOU LIKE ME TO GO AHEAD WITH THE CT? I JUST WANT TO MAKE SURE IT WANT SOMETHING MORE THAN STONES... ( AND IF THAT WAS STONES, NO I HAVE NEVER HAD THEM BEFORE) SUPER PAINFUL!! IM AT WORK UNTIL 5PM.. I HOPE U HAVE A GREAT MONDAY  ----- Message -----  From: KIRAN Yuen  Sent: 6/20/2018 12:02 PM PDT  To: Lizz Storey  Subject: RE: Non-Urgent Medical Question  We will determine based on what the ultrasound shows.  Sharon LUIS      ----- Message -----   From: Lizz Storey   Sent: 6/20/2018 11:54 AM PDT   To: KIRAN Yuen  Subject: Non-Urgent Medical Question    thank you so... much and i most def understand!! i will email you once i do ultrasound.. now do you want me to keep ct scan or just ultrasound?  ----- Message -----  From: KIRAN Yuen  Sent: 6/20/2018 11:00 AM PDT  To: Lizz Storey  Subject: RE: Non-Urgent Medical Question  I'm glad you're able to get in Friday, better than waiting until next week. It does sound like kidney stones, depending on the size we may need to refer you to urology.  I have printed a new prescription for the pain medication which you can  at the . I will not be able to refill again without seeing you in the office for an appointment. Make sure you're drinking a lot of fluids.  Sharon LUIS      ----- Message -----   From: Lizz Storey   Sent: 6/20/2018 10:42 AM PDT   To: KIRAN Yuen  Subject: Non-Urgent Medical Question    good morning Dr. Mcdowell,called imaging  "&cant get in till Friday@5pm. im completely out of pain meds now(i tried to make last)& still in same pain,if alil more heightened. cant call in to work again or ill lose my job. I know you are very frustrated&im as well i just hope not w/me. im really trying to do everything i can. i honestly think its stones,its more heightened in restroom last day-day 1/2. i have to  on something. ill keep fri apt til i hear from you but may i please get arefill so i can come to work & actually sleep thru the night? other than that, i hope you have a better day than I&again, i do appreciate all youre doing. im frustrated because if i had [employer] from the [facility] i wouldn't have this problem, but i work.  ----- Message -----  From: EZE YuenRCHEN  Sent: 6/19/2018 3:06 PM PDT  To: Lizz Storey  Subject: RE: Non-Urgent Medical Question  This is very frustrating. I have changed the order again, please try to call again. I think you'll be able to schedule now.    ----- Message -----   From: Lizz Storey   Sent: 6/19/2018 2:36 PM PDT   To: KIRAN Yuen  Subject: Non-Urgent Medical Question    OK.... grrrrrr... so i dont know what to do!! i called imaging to schedule my ultrasound and.... due to my GREAT insurance, I AGAIN have to wait 7 days as a stipulation of my insurance- even though you put \"urgent\" ... WHAT DO I DO??... im in pain in my back but only have 2 pills left for pain. im still taking the bactrim, and kept the ct scan since im not sure which one you prefer since its the same issue . my mom thinks its a stone june she hears me when in the restroom & shes had them & says what i describe is what she had... please, let me know what to do which apt to keep/make.. and again thank you for all your help!    ----- Message -----  From: KIRAN Yuen  Sent: 6/19/2018 10:02 AM PDT  To: Lizz Storey  Subject: RE: Non-Urgent Medical Question  Samuel Zamudio,  Your insurance had an unusual " requirement that you wait at least 7 days after your appointment to do the CT. I've never seen this happen before. So as an alternative I ordered an ultrasound, I think you will be able to schedule this faster. Please call [phone] and see when you can get the ultrasound appointment. This is what my assistant was calling you about last night.  Sharon LUIS            ----- Message -----   From: Lizz Storey   Sent: 6/19/2018 9:55 AM PDT   To: KIRAN Yuen  Subject: Non-Urgent Medical Question    hi dr weaver, i got a message from your ma last night but i was at work.. im here now but may be going home early do to my pain not dying down... i did make my ct scan apt like i stated in the last message but its not till the 27th which is NEXT Wednesday... so..... [facility] and dont know what to do for the pain until then. i have 3 left for the day which my mom has been helping with and me only taking when the other isnt helping... so my main thing is just letting you know why i havent been able to call you or your ma back....  ----- Message -----  From: KIRAN Yuen  Sent: 6/18/2018 9:27 AM PDT  To: Lizz Storey  Subject: RE: Non-Urgent Medical Question  Your labs and urine test are not showing an infectious cause for your pain. Please get scheduled for the CT as soon as you can so that we can determine if there is a stone causing your symptoms.    ----- Message -----   From: Lizz Storey   Sent: 6/17/2018 11:39 AM PDT   To: KIRAN Yuen  Subject: Non-Urgent Medical Question    Good Morning, sorry to bother you but i wanted to keep you informed on my issue, its now im back AND lower belly CONSTANT, but not so much when i use the bathroom like friday... Pain is same level if not alil hightened but thankfully percocet is helping... I dont know what to do other than what i have, but [facility] ... Let me know if i need to come back and see you. I have to  make my ultrasound esther but i got my blood drawn fri after i left....    Thank you for your help again!!

## 2018-07-02 ENCOUNTER — PATIENT MESSAGE (OUTPATIENT)
Dept: MEDICAL GROUP | Facility: MEDICAL CENTER | Age: 33
End: 2018-07-02

## 2018-07-20 ENCOUNTER — HOSPITAL ENCOUNTER (OUTPATIENT)
Facility: MEDICAL CENTER | Age: 33
End: 2018-07-20
Attending: NURSE PRACTITIONER
Payer: COMMERCIAL

## 2018-07-20 ENCOUNTER — OFFICE VISIT (OUTPATIENT)
Dept: MEDICAL GROUP | Facility: MEDICAL CENTER | Age: 33
End: 2018-07-20
Payer: COMMERCIAL

## 2018-07-20 VITALS
TEMPERATURE: 98 F | HEART RATE: 82 BPM | WEIGHT: 185 LBS | RESPIRATION RATE: 16 BRPM | SYSTOLIC BLOOD PRESSURE: 112 MMHG | HEIGHT: 65 IN | BODY MASS INDEX: 30.82 KG/M2 | DIASTOLIC BLOOD PRESSURE: 72 MMHG | OXYGEN SATURATION: 98 %

## 2018-07-20 DIAGNOSIS — R30.0 DYSURIA: ICD-10-CM

## 2018-07-20 DIAGNOSIS — R10.9 FLANK PAIN: ICD-10-CM

## 2018-07-20 LAB
APPEARANCE UR: NORMAL
BILIRUB UR STRIP-MCNC: NEGATIVE MG/DL
COLOR UR AUTO: NORMAL
GLUCOSE UR STRIP.AUTO-MCNC: NEGATIVE MG/DL
KETONES UR STRIP.AUTO-MCNC: NEGATIVE MG/DL
LEUKOCYTE ESTERASE UR QL STRIP.AUTO: NEGATIVE
NITRITE UR QL STRIP.AUTO: NEGATIVE
PH UR STRIP.AUTO: 5 [PH] (ref 5–8)
PROT UR QL STRIP: NORMAL MG/DL
RBC UR QL AUTO: NORMAL
SP GR UR STRIP.AUTO: 1.03
UROBILINOGEN UR STRIP-MCNC: NEGATIVE MG/DL

## 2018-07-20 PROCEDURE — 81002 URINALYSIS NONAUTO W/O SCOPE: CPT | Performed by: NURSE PRACTITIONER

## 2018-07-20 PROCEDURE — 99214 OFFICE O/P EST MOD 30 MIN: CPT | Performed by: NURSE PRACTITIONER

## 2018-07-20 PROCEDURE — 87086 URINE CULTURE/COLONY COUNT: CPT

## 2018-07-20 RX ORDER — OXYCODONE HYDROCHLORIDE AND ACETAMINOPHEN 5; 325 MG/1; MG/1
1 TABLET ORAL EVERY 8 HOURS PRN
Qty: 10 TAB | Refills: 0 | Status: SHIPPED | OUTPATIENT
Start: 2018-07-20 | End: 2018-07-24 | Stop reason: SDUPTHER

## 2018-07-20 RX ORDER — SULFAMETHOXAZOLE AND TRIMETHOPRIM 800; 160 MG/1; MG/1
1 TABLET ORAL 2 TIMES DAILY
Qty: 6 TAB | Refills: 0 | Status: SHIPPED | OUTPATIENT
Start: 2018-07-20 | End: 2018-10-22

## 2018-07-20 NOTE — PROGRESS NOTES
"Subjective:     Chief Complaint   Patient presents with   • Other     URINARY ISSUES     Lizz Storey is a 33 y.o. female established patient here for evaluation of L flank pain and dysuria. She was seen for the same 6/15, renal CT was ordered at that time to evaluate for stone but she has, unfortunately, not been able to schedule. Her pain seemed to improve until 2 days ago. Pain is currently 8/10 in the L flank. She has burning with urination \"feels like razor blades\". She has been using ibuprofen and tylenol with no relief. She is missing work today.  No fever, chills, nausea, vomiting, gross hematuria    No problem-specific Assessment & Plan notes found for this encounter.       Current medicines (including changes today)  Current Outpatient Prescriptions   Medication Sig Dispense Refill   • sulfamethoxazole-trimethoprim (BACTRIM DS) 800-160 MG tablet Take 1 Tab by mouth 2 times a day. 6 Tab 0   • oxyCODONE-acetaminophen (PERCOCET) 5-325 MG Tab Take 1 Tab by mouth every 8 hours as needed for up to 3 days. 10 Tab 0   • sulfamethoxazole-trimethoprim (BACTRIM DS) 800-160 MG tablet Take 1 Tab by mouth 2 times a day. 10 Tab 0   • ondansetron (ZOFRAN ODT) 4 MG TABLET DISPERSIBLE Take 1 Tab by mouth every 6 hours as needed for Nausea. 30 Tab 2   • budesonide-formoterol (SYMBICORT) 80-4.5 MCG/ACT Aerosol Inhale 2 Puffs by mouth 2 Times a Day. 1 Inhaler 5   • ipratropium-albuterol (DUONEB) 0.5-2.5 (3) MG/3ML nebulizer solution 3 mL by Nebulization route every four hours as needed for Shortness of Breath. 30 Bullet 0   • leuprolide (LUPRON PEDIATRIC) 11.25 MG Kit 11.25 mg by Intramuscular route every 90 days.     • acyclovir (ZOVIRAX) 400 MG tablet Take 400 mg by mouth every day.     • promethazine (PHENERGAN) 25 MG Tab Take 25 mg by mouth every 6 hours as needed for Nausea/Vomiting.     • omeprazole (PRILOSEC) 20 MG delayed-release capsule Take 1 Cap by mouth every day. 30 Cap 2   • albuterol 108 (90 Base) " "MCG/ACT Aero Soln inhalation aerosol Inhale 2 Puffs by mouth every 6 hours as needed for Shortness of Breath. 8.5 g 3   • albuterol 108 (90 BASE) MCG/ACT Aero Soln inhalation aerosol Inhale 2 Puffs by mouth every 6 hours as needed for Shortness of Breath. 1 Inhaler 0     No current facility-administered medications for this visit.      She  has a past medical history of ASTHMA; Cyst of ovary, left; Endometriosis; Fibroid, uterus; Genital herpes in women; HPV in female; Kidney infection; and Psychiatric disorder. She also has no past medical history of CAD (coronary artery disease); Cancer (HCC); Congestive heart failure (HCC); COPD; Diabetes; Hypertension; Liver disease; Seizure disorder (HCC); or Stroke (HCC).    ROS included above     Objective:     Blood pressure 112/72, pulse 82, temperature 36.7 °C (98 °F), resp. rate 16, height 1.65 m (5' 4.96\"), weight 83.9 kg (185 lb), SpO2 98 %. Body mass index is 30.82 kg/m².     Physical Exam:  General: Alert, oriented in no acute distress.  Eye contact is good, speech is normal, affect calm  Lungs: clear to auscultation bilaterally, normal effort, no wheeze/ rhonchi/ rales.  CV: regular rate and rhythm, S1, S2, no murmur  Abdomen: soft, nontender, L CVAT, no hepatosplenomegaly  Ext: no edema, color normal, vascularity normal, temperature normal    Assessment and Plan:   The following treatment plan was discussed   1. Flank pain  Flank pain and dysuria starting again 2 days ago. She was seen for similar symptoms 6/15, urine culture was negative at that time and she has yet to schedule CT. She will schedule this today. Will start bactrim to cover for potential infection. Encouraged increased fluids. 10 tabs of percocet provided as she is getting no relief with ibuprofen and tylenol.  report reviewed, no concerning findings. Opiate consent form reviewed and signed.  sulfamethoxazole-trimethoprim (BACTRIM DS) 800-160 MG tablet    URINE CULTURE(NEW)    POCT Urinalysis    " oxyCODONE-acetaminophen (PERCOCET) 5-325 MG Tab    CONSENT FOR OPIATE PRESCRIPTION   2. Dysuria  sulfamethoxazole-trimethoprim (BACTRIM DS) 800-160 MG tablet    URINE CULTURE(NEW)    POCT Urinalysis    oxyCODONE-acetaminophen (PERCOCET) 5-325 MG Tab    CONSENT FOR OPIATE PRESCRIPTION       Followup: pending tests         Please note that this dictation was created using voice recognition software. I have worked with consultants from the vendor as well as technical experts from [facility] to optimize the interface. I have made every reasonable attempt to correct obvious errors, but I expect that there are errors of grammar and possibly content that I did not discover before finalizing the note.

## 2018-07-23 ENCOUNTER — PATIENT MESSAGE (OUTPATIENT)
Dept: MEDICAL GROUP | Facility: MEDICAL CENTER | Age: 33
End: 2018-07-23

## 2018-07-23 DIAGNOSIS — R30.0 DYSURIA: ICD-10-CM

## 2018-07-23 DIAGNOSIS — R10.9 FLANK PAIN: ICD-10-CM

## 2018-07-23 LAB
BACTERIA UR CULT: NORMAL
SIGNIFICANT IND 70042: NORMAL
SITE SITE: NORMAL
SOURCE SOURCE: NORMAL

## 2018-07-23 NOTE — TELEPHONE ENCOUNTER
From: Lizz Storey  To: KIRAN Yuen  Sent: 7/23/2018 4:10 PM PDT  Subject: Non-Urgent Medical Question    MADE MY APT..!! but im out of pain meds and my apt is saturday morning at [facility]... can you refill till then? also saw that my urine came back clean, AGAIN!! this is very frustrating and im sorry to bug you   ----- Message -----  From: KIRAN Yuen  Sent: 6/25/2018 10:21 AM PDT  To: Lizz Storey  Subject: RE: Non-Urgent Medical Question  Yes, please get the CT done so we have an answer to what is causing your pain and can determine if we need to have you seen by urology.  Shraon LUIS      ----- Message -----   From: Lizz Storey   Sent: 6/25/2018 9:32 AM PDT   To: KIRAN Yuen  Subject: Non-Urgent Medical Question    GOOD MORNING DR PADILLA, OMER I WASNT ABLE TO GO TO WORK ON FRI BECAUSE WHATEVER WAS GOING ON WENT TO A HEAD ALL FRIDAY! THANKFULLY SINCE THEN I HAVE NOT BEEN IN PAIN LIKE I WAS.. I AM AT ABOUT A 3 BT THATS NORMAL... BUT BECAUSE OF THE ISSUE FRIDAY I WASNT ABLE TO MAKE MY ULTRASOUND BUT I STILL HAVE MY CT WEDS... WOULD YOU LIKE ME TO GO AHEAD WITH THE CT? I JUST WANT TO MAKE SURE IT WANT SOMETHING MORE THAN STONES... ( AND IF THAT WAS STONES, NO I HAVE NEVER HAD THEM BEFORE) SUPER PAINFUL!! IM AT WORK UNTIL 5PM.. I HOPE U HAVE A GREAT MONDAY  ----- Message -----  From: KIRAN Yuen  Sent: 6/20/2018 12:02 PM PDT  To: Lizz Storey  Subject: RE: Non-Urgent Medical Question  We will determine based on what the ultrasound shows.  Sharon LUIS      ----- Message -----   From: Lizz Storey    Sent: 6/20/2018 11:54 AM PDT   To: KIRAN Yuen  Subject: Non-Urgent Medical Question    thank you so... much and i most def understand!! i will email you once i do ultrasound.. now do you want me to keep ct scan or just ultrasound?  ----- Message -----  From: KIRAN Yuen  Sent: 6/20/2018 11:00 AM PDT  To:  Lizz Storey  Subject: RE: Non-Urgent Medical Question  I'm glad you're able to get in Friday, better than waiting until next week. It does sound like kidney stones, depending on the size we may need to refer you to urology.  I have printed a new prescription for the pain medication which you can  at the . I will not be able to refill again without seeing you in the office for an appointment. Make sure you're drinking a lot of fluids.  Sharon LUIS      ----- Message -----   From: Lizz Storey   Sent: 6/20/2018 10:42 AM PDT   To: KIRAN Yuen  Subject: Non-Urgent Medical Question    good morning Dr. Mcdowell,called imaging &cant get in till Friday@5pm. im completely out of pain meds now(i tried to make last)& still in same pain,if alil more heightened. cant call in to work again or ill lose my job. I know you are very frustrated&im as well i just hope not w/me. im really trying to do everything i can. i honestly think its stones,its more heightened in restroom last day-day 1/2. i have to  on something. ill keep fri apt til i hear from you but may i please get arefill so i can come to work & actually sleep thru the night? other than that, i hope you have a better day than I&again, i do appreciate all youre doing. im frustrated because if i had am[employer] from the state i wouldn't have this problem, but i work.  ----- Message -----  From: KIRAN Yuen  Sent: 6/19/2018 3:06 PM PDT  To: Lizz Storey  Subject: RE: Non-Urgent Medical Question  This is very frustrating. I have changed the order again, please try to call again. I think you'll be able to schedule now.    ----- Message -----   From: Lizz Storey   Sent: 6/19/2018 2:36 PM PDT   To: Poly C Mcdowell, A.P.R.N.  Subject: Non-Urgent Medical Question    OK.... grrrrrr... so i dont know what to do!! i called imaging to schedule my ultrasound and.... due to my GREAT insurance, I AGAIN have to wait 7 days as  "a stipulation of my insurance- even though you put \"urgent\" ... WHAT DO I DO??... im in pain in my back but only have 2 pills left for pain. im still taking the bactrim, and kept the ct scan since im not sure which one you prefer since its the same issue . my mom thinks its a stone june she hears me when in the restroom & shes had them & says what i describe is what she had... please, let me know what to do which apt to keep/make.. and again thank you for all your help!    ----- Message -----  From: KIRAN Yeun  Sent: 6/19/2018 10:02 AM PDT  To: Lizz Storey  Subject: RE: Non-Urgent Medical Question  Samuel Zamudio,  Your insurance had an unusual requirement that you wait at least 7 days after your appointment to do the CT. I've never seen this happen before. So as an alternative I ordered an ultrasound, I think you will be able to schedule this faster. Please call [phone] and see when you can get the ultrasound appointment. This is what my assistant was calling you about last night.  Sharon LUIS            ----- Message -----   From: Lizz Storey   Sent: 6/19/2018 9:55 AM PDT   To: KIRAN Yuen  Subject: Non-Urgent Medical Question    hi dr weaver, i got a message from your ma last night but i was at work.. im here now but may be going home early do to my pain not dying down... i did make my ct scan apt like i stated in the last message but its not till the 27th which is NEXT Wednesday... so..... [facility] and dont know what to do for the pain until then. i have 3 left for the day which my mom has been helping with and me only taking when the other isnt helping... so my main thing is just letting you know why i havent been able to call you or your ma back....  ----- Message -----  From: KIRAN Yuen  Sent: 6/18/2018 9:27 AM PDT  To: Lizz Storey  Subject: RE: Non-Urgent Medical Question  Your labs and urine test are not showing an infectious cause for your " pain. Please get scheduled for the CT as soon as you can so that we can determine if there is a stone causing your symptoms.    ----- Message -----   From: Lizz Storey   Sent: 6/17/2018 11:39 AM PDT   To: KIRAN Yuen  Subject: Non-Urgent Medical Question    Good Morning, sorry to bother you but i wanted to keep you informed on my issue, its now im back AND lower belly CONSTANT, but not so much when i use the bathroom like friday... Pain is same level if not alil hightened but thankfully percocet is helping... I dont know what to do other than what i have, but just [facility] ... Let me know if i need to come back and see you. I have to make my ultrasound esther but i got my blood drawn fri after i left....    Thank you for your help again!!

## 2018-07-24 RX ORDER — OXYCODONE HYDROCHLORIDE AND ACETAMINOPHEN 5; 325 MG/1; MG/1
1 TABLET ORAL EVERY 8 HOURS PRN
Qty: 15 TAB | Refills: 0 | Status: SHIPPED | OUTPATIENT
Start: 2018-07-24 | End: 2018-07-29

## 2018-07-25 ENCOUNTER — PATIENT MESSAGE (OUTPATIENT)
Dept: MEDICAL GROUP | Facility: MEDICAL CENTER | Age: 33
End: 2018-07-25

## 2018-07-25 RX ORDER — PROMETHAZINE HYDROCHLORIDE 25 MG/1
25 TABLET ORAL EVERY 6 HOURS PRN
Qty: 30 TAB | Refills: 0 | Status: SHIPPED | OUTPATIENT
Start: 2018-07-25 | End: 2019-03-04 | Stop reason: SDUPTHER

## 2018-07-25 NOTE — TELEPHONE ENCOUNTER
From: Lizz Storey  To: KIRAN Yuen  Sent: 7/25/2018 9:09 AM PDT  Subject: Non-Urgent Medical Question    thank you very much, is the other at the ? im getting my lupron shot for the month as well at lunch so i was gonna see about swinging by there...    ----- Message -----  From: KIRAN Yuen  Sent: 7/25/2018 8:17 AM PDT  To: Lizz Storey  Subject: RE: Non-Urgent Medical Question  I have sent the phenergan.  Sharon LUIS      ----- Message -----   From: Lizz Storey   Sent: 7/25/2018 8:12 AM PDT   To: KIRAN Yuen  Subject: Non-Urgent Medical Question    On top of the refill for percocet can you also send in a Phenergan? non dissolve?.. i have zofran but i dont like to rely on it except for emergency situations... july just been getting ana villanueva   ----- Message -----  From: KIRAN Yuen  Sent: 7/24/2018 7:24 AM PDT  To: Lizz Storey  Subject: RE: Non-Urgent Medical Question  I will plan to refill for you to  today but I will not be able to renew again without seeing you.  I will let you know when I get the image result  Sharon LUIS        .    ----- Message -----   From: Lizz Storey   Sent: 7/23/2018 4:26 PM PDT   To: KIRAN Yuen  Subject: RE: Non-Urgent Medical Question    i have 3 left ;-) out of the 10 on friday.....  ----- Message -----  From: KIRAN Yuen  Sent: 7/23/2018 4:18 PM PDT  To: Lizz Storey  Subject: RE: Non-Urgent Medical Question  Are you out of medication already?    ----- Message -----   From: Lizz Storey   Sent: 7/23/2018 4:10 PM PDT   To: KIRAN Yuen  Subject: Non-Urgent Medical Question    MADE MY APT..!! but im out of pain meds and my apt is saturday morning at [facility]... can you refill till then? also saw that my urine came back clean, AGAIN!! this is very frustrating and im sorry to bug you   ----- Message -----  From: Poly Mcdowell,  KIRAN  Sent: 6/25/2018 10:21 AM PDT  To: Lizz Storey  Subject: RE: Non-Urgent Medical Question  Yes, please get the CT done so we have an answer to what is causing your pain and can determine if we need to have you seen by urology.  Sharon LUIS      ----- Message -----   From: Lizz Storey   Sent: 6/25/2018 9:32 AM PDT   To: KIRAN Yuen  Subject: Non-Urgent Medical Question    GOOD MORNING DR PADILLA, ADENIKEOOOO I WASNT ABLE TO GO TO WORK ON FRI BECAUSE WHATEVER WAS GOING ON WENT TO A HEAD ALL FRIDAY! THANKFULLY SINCE THEN I HAVE NOT BEEN IN PAIN LIKE I WAS.. I AM AT ABOUT A 3 BT THATS NORMAL... BUT BECAUSE OF THE ISSUE FRIDAY I WASNT ABLE TO MAKE MY ULTRASOUND BUT I STILL HAVE MY CT WEDS... WOULD YOU LIKE ME TO GO AHEAD WITH THE CT? I JUST WANT TO MAKE SURE IT WANT SOMETHING MORE THAN STONES... ( AND IF THAT WAS STONES, NO I HAVE NEVER HAD THEM BEFORE) SUPER PAINFUL!! IM AT WORK UNTIL 5PM.. I HOPE U HAVE A GREAT MONDAY  ----- Message -----  From: KIRAN Yuen  Sent: 6/20/2018 12:02 PM PDT  To: Lizz Storey  Subject: RE: Non-Urgent Medical Question  We will determine based on what the ultrasound shows.  Sharon LUIS      ----- Message -----   From: Lizz Storey   Sent: 6/20/2018 11:54 AM PDT   To: KIRAN Yuen  Subject: Non-Urgent Medical Question    thank you so... much and i most def understand!! i will email you once i do ultrasound.. now do you want me to keep ct scan or just ultrasound?  ----- Message -----  From: KIRAN Yuen  Sent: 6/20/2018 11:00 AM PDT  To: Lizz Storey  Subject: RE: Non-Urgent Medical Question  I'm glad you're able to get in Friday, better than waiting until next week. It does sound like kidney stones, depending on the size we may need to refer you to urology.  I have printed a new prescription for the pain medication which you can  at the . I will not be able to refill again without seeing you in  "the office for an appointment. Make sure you're drinking a lot of fluids.  Sharon LUIS      ----- Message -----   From: Lizz Storey   Sent: 6/20/2018 10:42 AM PDT   To: KIRAN Yuen  Subject: Non-Urgent Medical Question    good morning Dr. Mcdowell,called imaging &cant get in till Friday@5pm. im completely out of pain meds now(i tried to make last)& still in same pain,if alil more heightened. cant call in to work again or ill lose my job. I know you are very frustrated&im as well i just hope not w/me. im really trying to do everything i can. i honestly think its stones,its more heightened in restroom last day-day 1/2. i have to  on something. ill keep fri apt til i hear from you but may i please get arefill so i can come to work & actually sleep thru the night? other than that, i hope you have a better day than I&again, i do appreciate all youre doing. im frustrated because if i had amerigroup from the [facility] i wouldn't have this problem, but i work.  ----- Message -----  From: KIRAN Yuen  Sent: 6/19/2018 3:06 PM PDT  To: Lizz Storey  Subject: RE: Non-Urgent Medical Question  This is very frustrating. I have changed the order again, please try to call again. I think you'll be able to schedule now.    ----- Message -----   From: Lizz Storey   Sent: 6/19/2018 2:36 PM PDT   To: KIRAN Yuen  Subject: Non-Urgent Medical Question    OK.... grrrrrr... so i dont know what to do!! i called imaging to schedule my ultrasound and.... due to my GREAT insurance, I AGAIN have to wait 7 days as a stipulation of my insurance- even though you put \"urgent\" ... WHAT DO I DO??... im in pain in my back but only have 2 pills left for pain. im still taking the bactrim, and kept the ct scan since im not sure which one you prefer since its the same issue . my mom thinks its a stone june she hears me when in the restroom & shes had them & says what i describe is what she had... please, " let me know what to do which apt to keep/make.. and again thank you for all your help!    ----- Message -----  From: KIRAN Yuen  Sent: 6/19/2018 10:02 AM PDT  To: Lizz Storey  Subject: RE: Non-Urgent Medical Question  Samuel Cartagenay,  Your insurance had an unusual requirement that you wait at least 7 days after your appointment to do the CT. I've never seen this happen before. So as an alternative I ordered an ultrasound, I think you will be able to schedule this faster. Please call [phone] and see when you can get the ultrasound appointment. This is what my assistant was calling you about last night.  Sharon LUIS            ----- Message -----   From: Lizz Storey   Sent: 6/19/2018 9:55 AM PDT   To: KIRAN Yuen  Subject: Non-Urgent Medical Question    hi dr weaver, i got a message from your ma last night but i was at work.. im here now but may be going home early do to my pain not dying down... i did make my ct scan apt like i stated in the last message but its not till the 27th which is NEXT Wednesday... so..... [facility] and dont know what to do for the pain until then. i have 3 left for the day which my mom has been helping with and me only taking when the other isnt helping... so my main thing is just letting you know why i havent been able to call you or your ma back....  ----- Message -----  From: KIRAN Yuen  Sent: 6/18/2018 9:27 AM PDT  To: Lizz Storey  Subject: RE: Non-Urgent Medical Question  Your labs and urine test are not showing an infectious cause for your pain. Please get scheduled for the CT as soon as you can so that we can determine if there is a stone causing your symptoms.    ----- Message -----   From: Lizz Storey   Sent: 6/17/2018 11:39 AM PDT   To: KIRAN Yuen  Subject: Non-Urgent Medical Question    Good Morning, sorry to bother you but i wanted to keep you informed on my issue, its now im back AND lower belly  CONSTANT, but not so much when i use the bathroom like friday... Pain is same level if not alil hightened but thankfully percocet is helping... I dont know what to do other than what i have, but just [facility] ... Let me know if i need to come back and see you. I have to make my ultrasound esther but i got my blood drawn fri after i left....    Thank you for your help again!!

## 2018-07-25 NOTE — TELEPHONE ENCOUNTER
From: Lizz Storey  To: KIRAN Yuen  Sent: 7/25/2018 8:12 AM PDT  Subject: Non-Urgent Medical Question    On top of the refill for percocet can you also send in a Phenergan? non dissolve?.. i have zofran but i dont like to rely on it except for emergency situations... july just been getting ana villanueva   ----- Message -----  From: KIRAN Yuen  Sent: 7/24/2018 7:24 AM PDT  To: Lizz Storey  Subject: RE: Non-Urgent Medical Question  I will plan to refill for you to  today but I will not be able to renew again without seeing you.  I will let you know when I get the image result  Sharon LUIS        .    ----- Message -----   From: Lizz Storey   Sent: 7/23/2018 4:26 PM PDT   To: KIRAN Yuen  Subject: RE: Non-Urgent Medical Question    i have 3 left ;-) out of the 10 on friday.....  ----- Message -----  From: KIRAN Yuen  Sent: 7/23/2018 4:18 PM PDT  To: Lizz Storey  Subject: RE: Non-Urgent Medical Question  Are you out of medication already?    ----- Message -----   From: Lizz Storey   Sent: 7/23/2018 4:10 PM PDT   To: KIRAN Yuen  Subject: Non-Urgent Medical Question    MADE MY APT..!! but im out of pain meds and my apt is saturday morning at [facility]... can you refill till then? also saw that my urine came back clean, AGAIN!! this is very frustrating and im sorry to bug you   ----- Message -----  From: KIRAN Yuen  Sent: 6/25/2018 10:21 AM PDT  To: Lizz Storey  Subject: RE: Non-Urgent Medical Question  Yes, please get the CT done so we have an answer to what is causing your pain and can determine if we need to have you seen by urology.  Sharon LUIS      ----- Message -----   From: Lizz Storey   Sent: 6/25/2018 9:32 AM PDT   To: KIRAN Yuen  Subject: Non-Urgent Medical Question    GOOD MORNING DR PADILLA, SOOOOO I WASNT ABLE TO GO TO WORK ON FRI BECAUSE WHATEVER WAS GOING  ON WENT TO A HEAD ALL FRIDAY! THANKFULLY SINCE THEN I HAVE NOT BEEN IN PAIN LIKE I WAS.. I AM AT ABOUT A 3 BT THATS NORMAL... BUT BECAUSE OF THE ISSUE FRIDAY I WASNT ABLE TO MAKE MY ULTRASOUND BUT I STILL HAVE MY CT WEDS... WOULD YOU LIKE ME TO GO AHEAD WITH THE CT? I JUST WANT TO MAKE SURE IT WANT SOMETHING MORE THAN STONES... ( AND IF THAT WAS STONES, NO I HAVE NEVER HAD THEM BEFORE) SUPER PAINFUL!! IM AT WORK UNTIL 5PM.. I HOPE U HAVE A GREAT MONDAY  ----- Message -----  From: KIRAN Yuen  Sent: 6/20/2018 12:02 PM PDT  To: Lizz Storey  Subject: RE: Non-Urgent Medical Question  We will determine based on what the ultrasound shows.  Sharon LUIS      ----- Message -----   From: Lizz Storey   Sent: 6/20/2018 11:54 AM PDT   To: KIRAN Yuen  Subject: Non-Urgent Medical Question    thank you so... much and i most def understand!! i will email you once i do ultrasound.. now do you want me to keep ct scan or just ultrasound?  ----- Message -----  From: KIRAN Yuen  Sent: 6/20/2018 11:00 AM PDT  To: Lizz Storey  Subject: RE: Non-Urgent Medical Question  I'm glad you're able to get in Friday, better than waiting until next week. It does sound like kidney stones, depending on the size we may need to refer you to urology.  I have printed a new prescription for the pain medication which you can  at the . I will not be able to refill again without seeing you in the office for an appointment. Make sure you're drinking a lot of fluids.  Sharon LUIS      ----- Message -----   From: Lizz Storey   Sent: 6/20/2018 10:42 AM PDT   To: KIRAN Yuen  Subject: Non-Urgent Medical Question    good morning Dr. Mcdowell,called imaging &mindy get in till Friday@5pm. im completely out of pain meds now(i tried to make last)& still in same pain,if alil more heightened. mindy call in to work again or ill lose my job. I know you are very frustrated&im as  "well i just hope not w/me. im really trying to do everything i can. i honestly think its stones,its more heightened in restroom last day-day 1/2. i have to  on something. ill keep fri apt til i hear from you but may i please get arefill so i can come to work & actually sleep thru the night? other than that, i hope you have a better day than I&again, i do appreciate all youre doing. im frustrated because if i had amerigroup from the [facility] i wouldn't have this problem, but i work.  ----- Message -----  From: KIRAN Yuen  Sent: 6/19/2018 3:06 PM PDT  To: Lizz Storey  Subject: RE: Non-Urgent Medical Question  This is very frustrating. I have changed the order again, please try to call again. I think you'll be able to schedule now.    ----- Message -----   From: Lizz Storey   Sent: 6/19/2018 2:36 PM PDT   To: KIRAN Yuen  Subject: Non-Urgent Medical Question    OK.... grrrrrr... so i dont know what to do!! i called imaging to schedule my ultrasound and.... due to my GREAT insurance, I AGAIN have to wait 7 days as a stipulation of my insurance- even though you put \"urgent\" ... WHAT DO I DO??... im in pain in my back but only have 2 pills left for pain. im still taking the bactrim, and kept the ct scan since im not sure which one you prefer since its the same issue . my mom thinks its a stone june she hears me when in the restroom & shes had them & says what i describe is what she had... please, let me know what to do which apt to keep/make.. and again thank you for all your help!    ----- Message -----  From: KIRAN Yuen  Sent: 6/19/2018 10:02 AM PDT  To: Lizz Storey  Subject: RE: Non-Urgent Medical Question  Hi Lizz,  Your insurance had an unusual requirement that you wait at least 7 days after your appointment to do the CT. I've never seen this happen before. So as an alternative I ordered an ultrasound, I think you will be able to schedule this faster. Please " call [phone] and see when you can get the ultrasound appointment. This is what my assistant was calling you about last night.  Sharon LUIS            ----- Message -----   From: Lizz Storey   Sent: 6/19/2018 9:55 AM PDT   To: KIRAN Yuen  Subject: Non-Urgent Medical Question    hi dr weaver, i got a message from your ma last night but i was at work.. im here now but may be going home early do to my pain not dying down... i did make my ct scan apt like i stated in the last message but its not till the 27th which is NEXT Wednesday... so..... [facility] and dont know what to do for the pain until then. i have 3 left for the day which my mom has been helping with and me only taking when the other isnt helping... so my main thing is just letting you know why i havent been able to call you or your ma back....  ----- Message -----  From: KIRAN Yuen  Sent: 6/18/2018 9:27 AM PDT  To: Lizz Storey  Subject: RE: Non-Urgent Medical Question  Your labs and urine test are not showing an infectious cause for your pain. Please get scheduled for the CT as soon as you can so that we can determine if there is a stone causing your symptoms.    ----- Message -----   From: Lizz Storey   Sent: 6/17/2018 11:39 AM PDT   To: KIRAN Yuen  Subject: Non-Urgent Medical Question    Good Morning, sorry to bother you but i wanted to keep you informed on my issue, its now im back AND lower belly CONSTANT, but not so much when i use the bathroom like friday... Pain is same level if not alil hightened but thankfully percocet is helping... I dont know what to do other than what i have, but just [facility] ... Let me know if i need to come back and see you. I have to make my ultrasound esther but i got my blood drawn fri after i left....    Thank you for your help again!!

## 2018-07-30 ENCOUNTER — PATIENT MESSAGE (OUTPATIENT)
Dept: MEDICAL GROUP | Facility: MEDICAL CENTER | Age: 33
End: 2018-07-30

## 2018-07-31 NOTE — TELEPHONE ENCOUNTER
From: Lizz Storey  To: KIRAN Yuen  Sent: 7/30/2018 1:41 PM PDT  Subject: Non-Urgent Medical Question    JUST FYI... i went to the ct but they wouldnt take my Medical expenses card and i didnt have $200 to pay for it.. all day yesterday july been in so much pain nauseated and it has continued today, im going to take a hot bath and debate on er or urgent care...most likely er but i wanted to just keep you informed because i didnt want you thinking i didnt go nor try to go to my apt.... nor did i want you surprised if i do go when the call or communicate with you... im trying the best i can with the little $ i have im sorry...  ----- Message -----  From: KIRAN Yuen  Sent: 7/25/2018 9:25 AM PDT  To: Lizz Storey  Subject: RE: Non-Urgent Medical Question  Yes, you can  anytime  Sharon LUIS      ----- Message -----   From: Lizz Storey   Sent: 7/25/2018 9:09 AM PDT   To: KIRAN Yuen  Subject: Non-Urgent Medical Question    thank you very much, is the other at the ? im getting my lupron shot for the month as well at lunch so i was gonna see about swinging by there...    ----- Message -----  From: KIRAN Yuen  Sent: 7/25/2018 8:17 AM PDT  To: Lizz Storey  Subject: RE: Non-Urgent Medical Question  I have sent the phenergan.  Sharon LUIS      ----- Message -----   From: Lizz Storey   Sent: 7/25/2018 8:12 AM PDT   To: KIRAN Yuen  Subject: Non-Urgent Medical Question    On top of the refill for percocet can you also send in a Phenergan? non dissolve?.. i have zofran but i dont like to rely on it except for emergency situations... july just been getting ana villanueva   ----- Message -----  From: KIRAN Yuen  Sent: 7/24/2018 7:24 AM PDT  To: Lizz Storey  Subject: RE: Non-Urgent Medical Question  I will plan to refill for you to  today but I will not be able to renew again without seeing  you.  I will let you know when I get the image result  Sharon LUIS        .    ----- Message -----   From: Lizz Storey   Sent: 7/23/2018 4:26 PM PDT   To: KIRAN Yuen  Subject: RE: Non-Urgent Medical Question    i have 3 left ;-) out of the 10 on friday.....  ----- Message -----  From: KIRAN Yuen  Sent: 7/23/2018 4:18 PM PDT  To: Lizz Storey  Subject: RE: Non-Urgent Medical Question  Are you out of medication already?    ----- Message -----   From: Lizz Storey   Sent: 7/23/2018 4:10 PM PDT   To: KIRAN Yuen  Subject: Non-Urgent Medical Question    MADE MY APT..!! but im out of pain meds and my apt is saturday morning at [facility]... can you refill till then? also saw that my urine came back clean, AGAIN!! this is very frustrating and im sorry to bug you   ----- Message -----  From: KIRAN Yuen  Sent: 6/25/2018 10:21 AM PDT  To: Lizz Storey  Subject: RE: Non-Urgent Medical Question  Yes, please get the CT done so we have an answer to what is causing your pain and can determine if we need to have you seen by urology.  Sharon LUIS      ----- Message -----   From: Lizz Storey   Sent: 6/25/2018 9:32 AM PDT   To: KIRAN Yuen  Subject: Non-Urgent Medical Question    GOOD MORNING OMER GILBERT I WASNT ABLE TO GO TO WORK ON FRI BECAUSE WHATEVER WAS GOING ON WENT TO A HEAD ALL FRIDAY! THANKFULLY SINCE THEN I HAVE NOT BEEN IN PAIN LIKE I WAS.. I AM AT ABOUT A 3 BT THATS NORMAL... BUT BECAUSE OF THE ISSUE FRIDAY I WASNT ABLE TO MAKE MY ULTRASOUND BUT I STILL HAVE MY CT WEDS... WOULD YOU LIKE ME TO GO AHEAD WITH THE CT? I JUST WANT TO MAKE SURE IT WANT SOMETHING MORE THAN STONES... ( AND IF THAT WAS STONES, NO I HAVE NEVER HAD THEM BEFORE) SUPER PAINFUL!! IM AT WORK UNTIL 5PM.. I HOPE U HAVE A GREAT MONDAY  ----- Message -----  From: KIRAN Yuen  Sent: 6/20/2018 12:02 PM PDT  To: Lizz Storey  Subject: RE:  Non-Urgent Medical Question  We will determine based on what the ultrasound shows.  Sharon LUIS      ----- Message -----   From: Lizz Storey   Sent: 6/20/2018 11:54 AM PDT   To: KIRAN Yuen  Subject: Non-Urgent Medical Question    thank you so... much and i most def understand!! i will email you once i do ultrasound.. now do you want me to keep ct scan or just ultrasound?  ----- Message -----  From: KIRAN Yuen  Sent: 6/20/2018 11:00 AM PDT  To: Lizz Storey  Subject: RE: Non-Urgent Medical Question  I'm glad you're able to get in Friday, better than waiting until next week. It does sound like kidney stones, depending on the size we may need to refer you to urology.  I have printed a new prescription for the pain medication which you can  at the . I will not be able to refill again without seeing you in the office for an appointment. Make sure you're drinking a lot of fluids.  Sharon LUIS      ----- Message -----   From: Lizz Storey   Sent: 6/20/2018 10:42 AM PDT   To: KIRAN Yuen  Subject: Non-Urgent Medical Question    good morning Dr. Mcdowell,called imaging &cant get in till Friday@5pm. im completely out of pain meds now(i tried to make last)& still in same pain,if alil more heightened. cant call in to work again or ill lose my job. I know you are very frustrated&im as well i just hope not w/me. im really trying to do everything i can. i honestly think its stones,its more heightened in restroom last day-day 1/2. i have to  on something. ill keep fri apt til i hear from you but may i please get arefill so i can come to work & actually sleep thru the night? other than that, i hope you have a better day than I&again, i do appreciate all youre doing. im frustrated because if i had amerigroup from the state i wouldn't have this problem, but i work.  ----- Message -----  From: KIRAN Yuen  Sent: 6/19/2018 3:06 PM PDT  To:  "Lizz Storey  Subject: RE: Non-Urgent Medical Question  This is very frustrating. I have changed the order again, please try to call again. I think you'll be able to schedule now.    ----- Message -----   From: Lizz Storey   Sent: 6/19/2018 2:36 PM PDT   To: KIRAN Yuen  Subject: Non-Urgent Medical Question    OK.... grrrrrr... so i dont know what to do!! i called imaging to schedule my ultrasound and.... due to my GREAT insurance, I AGAIN have to wait 7 days as a stipulation of my insurance- even though you put \"urgent\" ... WHAT DO I DO??... im in pain in my back but only have 2 pills left for pain. im still taking the bactrim, and kept the ct scan since im not sure which one you prefer since its the same issue . my mom thinks its a stone june she hears me when in the restroom & shes had them & says what i describe is what she had... please, let me know what to do which apt to keep/make.. and again thank you for all your help!    ----- Message -----  From: KIRAN Yuen  Sent: 6/19/2018 10:02 AM PDT  To: Lizz Storey  Subject: RE: Non-Urgent Medical Question  Hi Lizz,  Your insurance had an unusual requirement that you wait at least 7 days after your appointment to do the CT. I've never seen this happen before. So as an alternative I ordered an ultrasound, I think you will be able to schedule this faster. Please call [phone] and see when you can get the ultrasound appointment. This is what my assistant was calling you about last night.  Sharon LUIS            ----- Message -----   From: Lizz Storey   Sent: 6/19/2018 9:55 AM PDT   To: KIRAN Yuen  Subject: Non-Urgent Medical Question    jad weaver, i got a message from your ma last night but i was at work.. im here now but may be going home early do to my pain not dying down... i did make my ct scan apt like i stated in the last message but its not till the 27th which is NEXT Wednesday... so..... im at [facility] and dont know what to do for the pain until then. i have 3 left for the day which my mom has been helping with and me only taking when the other isnt helping... so my main thing is just letting you know why i havent been able to call you or your ma back....  ----- Message -----  From: KIRAN Yuen  Sent: 6/18/2018 9:27 AM PDT  To: Lizz Storey  Subject: RE: Non-Urgent Medical Question  Your labs and urine test are not showing an infectious cause for your pain. Please get scheduled for the CT as soon as you can so that we can determine if there is a stone causing your symptoms.    ----- Message -----   From: Lizz Storey   Sent: 6/17/2018 11:39 AM PDT   To: KIRAN Yuen  Subject: Non-Urgent Medical Question    Good Morning, sorry to bother you but i wanted to keep you informed on my issue, its now im back AND lower belly CONSTANT, but not so much when i use the bathroom like friday... Pain is same level if not alil hightened but thankfully percocet is helping... I dont know what to do other than what i have, but just [facility] ... Let me know if i need to come back and see you. I have to make my ultrasound esther but i got my blood drawn fri after i left....    Thank you for your help again!!

## 2018-08-07 ENCOUNTER — HOSPITAL ENCOUNTER (EMERGENCY)
Facility: MEDICAL CENTER | Age: 33
End: 2018-08-07
Attending: EMERGENCY MEDICINE
Payer: COMMERCIAL

## 2018-08-07 VITALS
DIASTOLIC BLOOD PRESSURE: 65 MMHG | HEIGHT: 63 IN | WEIGHT: 188.05 LBS | TEMPERATURE: 97 F | RESPIRATION RATE: 16 BRPM | BODY MASS INDEX: 33.32 KG/M2 | SYSTOLIC BLOOD PRESSURE: 111 MMHG | OXYGEN SATURATION: 97 % | HEART RATE: 83 BPM

## 2018-08-07 DIAGNOSIS — G89.29 CHRONIC PELVIC PAIN IN FEMALE: ICD-10-CM

## 2018-08-07 DIAGNOSIS — N94.6 DYSMENORRHEA: ICD-10-CM

## 2018-08-07 DIAGNOSIS — R10.2 CHRONIC PELVIC PAIN IN FEMALE: ICD-10-CM

## 2018-08-07 LAB
APPEARANCE UR: CLEAR
BILIRUB UR QL STRIP.AUTO: NEGATIVE
COLOR UR: YELLOW
GLUCOSE UR STRIP.AUTO-MCNC: NEGATIVE MG/DL
HCG UR QL: NEGATIVE
KETONES UR STRIP.AUTO-MCNC: NEGATIVE MG/DL
LEUKOCYTE ESTERASE UR QL STRIP.AUTO: NEGATIVE
MICRO URNS: NORMAL
NITRITE UR QL STRIP.AUTO: NEGATIVE
PH UR STRIP.AUTO: 6.5 [PH]
PROT UR QL STRIP: NEGATIVE MG/DL
RBC UR QL AUTO: NEGATIVE
SP GR UR REFRACTOMETRY: 1.01
SP GR UR STRIP.AUTO: 1.01
UROBILINOGEN UR STRIP.AUTO-MCNC: 0.2 MG/DL

## 2018-08-07 PROCEDURE — 700111 HCHG RX REV CODE 636 W/ 250 OVERRIDE (IP): Performed by: EMERGENCY MEDICINE

## 2018-08-07 PROCEDURE — 96372 THER/PROPH/DIAG INJ SC/IM: CPT

## 2018-08-07 PROCEDURE — 81025 URINE PREGNANCY TEST: CPT

## 2018-08-07 PROCEDURE — 81003 URINALYSIS AUTO W/O SCOPE: CPT

## 2018-08-07 PROCEDURE — 87591 N.GONORRHOEAE DNA AMP PROB: CPT

## 2018-08-07 PROCEDURE — 87491 CHLMYD TRACH DNA AMP PROBE: CPT

## 2018-08-07 PROCEDURE — 99284 EMERGENCY DEPT VISIT MOD MDM: CPT

## 2018-08-07 RX ORDER — OXYCODONE HYDROCHLORIDE AND ACETAMINOPHEN 5; 325 MG/1; MG/1
1-2 TABLET ORAL EVERY 4 HOURS PRN
Qty: 10 TAB | Refills: 0 | Status: SHIPPED | OUTPATIENT
Start: 2018-08-07 | End: 2018-08-10

## 2018-08-07 RX ADMIN — FENTANYL CITRATE 50 MCG: 50 INJECTION INTRAMUSCULAR; INTRAVENOUS at 16:49

## 2018-08-07 ASSESSMENT — ENCOUNTER SYMPTOMS
NAUSEA: 0
WEAKNESS: 0
CHILLS: 0
FEVER: 0
HEADACHES: 0
VOMITING: 0

## 2018-08-07 NOTE — ED PROVIDER NOTES
ED Provider Note    Scribed for Jt Mccloud M.D. by Chuy Mendoza. 8/7/2018  4:39 PM    Means of arrival: Walk in   History obtained by: Patient   Limitations: None      CHIEF COMPLAINT  Chief Complaint   Patient presents with   • Pelvic Pain       HPI  Lizz Storey is a 33 y.o. female with a history of fibroids and endometriosis who presents to the ED complaining of pelvic pain onset a week ago. The patient reports that this feels a lot like her normal fibroids and reports that she takes Lupron which has helped in the past. She reports that she has a history of two laparoscopy for her fibroids in the past. The patient reports that she spoke with her OB (Dr. Hinson) and has an appointment in two days but reports that her pain has gotten too bad that she cannot wait. She reports that she normally gets an US and pain medications when she comes in for this. She reports that she has been having some minimal clotting and abnormal vaginal discharge but denies any vaginal bleeding. She denies any nausea, vomiting, fever, chills, weakness, dizziness, headache at this time.    REVIEW OF SYSTEMS  Review of Systems   Constitutional: Negative for chills and fever.   Cardiovascular: Negative for chest pain.   Gastrointestinal: Negative for nausea and vomiting.   Genitourinary:        Pelvic pain   Neurological: Negative for weakness and headaches.     See HPI for further details.     PAST MEDICAL HISTORY   has a past medical history of ASTHMA; Cyst of ovary, left; Endometriosis; Fibroid, uterus; Genital herpes in women; HPV in female; Kidney infection; and Psychiatric disorder.    SOCIAL HISTORY  Social History     Social History Main Topics   • Smoking status: Former Smoker     Packs/day: 0.25     Years: 15.00   • Smokeless tobacco: Never Used      Comment: 1 year    • Alcohol use No      Comment: 2 times per year   • Drug use: Yes     Types: Inhaled, Marijuana      Comment: marijuana 2 x per month   • Sexual  activity: Not Currently       SURGICAL HISTORY   has a past surgical history that includes jean by laparoscopy; laparoscopy; other abdominal surgery; and appendectomy.    CURRENT MEDICATIONS  No current facility-administered medications on file prior to encounter.      Current Outpatient Prescriptions on File Prior to Encounter   Medication Sig Dispense Refill   • promethazine (PHENERGAN) 25 MG Tab Take 1 Tab by mouth every 6 hours as needed for Nausea/Vomiting. 30 Tab 0   • sulfamethoxazole-trimethoprim (BACTRIM DS) 800-160 MG tablet Take 1 Tab by mouth 2 times a day. 6 Tab 0   • sulfamethoxazole-trimethoprim (BACTRIM DS) 800-160 MG tablet Take 1 Tab by mouth 2 times a day. 10 Tab 0   • ondansetron (ZOFRAN ODT) 4 MG TABLET DISPERSIBLE Take 1 Tab by mouth every 6 hours as needed for Nausea. 30 Tab 2   • budesonide-formoterol (SYMBICORT) 80-4.5 MCG/ACT Aerosol Inhale 2 Puffs by mouth 2 Times a Day. 1 Inhaler 5   • omeprazole (PRILOSEC) 20 MG delayed-release capsule Take 1 Cap by mouth every day. 30 Cap 2   • albuterol 108 (90 Base) MCG/ACT Aero Soln inhalation aerosol Inhale 2 Puffs by mouth every 6 hours as needed for Shortness of Breath. 8.5 g 3   • ipratropium-albuterol (DUONEB) 0.5-2.5 (3) MG/3ML nebulizer solution 3 mL by Nebulization route every four hours as needed for Shortness of Breath. 30 Bullet 0   • albuterol 108 (90 BASE) MCG/ACT Aero Soln inhalation aerosol Inhale 2 Puffs by mouth every 6 hours as needed for Shortness of Breath. 1 Inhaler 0   • leuprolide (LUPRON PEDIATRIC) 11.25 MG Kit 11.25 mg by Intramuscular route every 90 days.     • acyclovir (ZOVIRAX) 400 MG tablet Take 400 mg by mouth every day.       ALLERGIES  Allergies   Allergen Reactions   • Carrot [Daucus Carota] Anaphylaxis     Only raw carrot; cooked carrot ok.   • Ciprofloxacin Hives   • Keflex      Mild hand swelling   • Ketorolac Tromethamine Rash   • Morphine Hives   • Penicillins Rash       PHYSICAL EXAM  /65   Pulse 83    "Temp 36.1 °C (97 °F) (Temporal)   Resp 16   Ht 1.6 m (5' 3\")   Wt 85.3 kg (188 lb 0.8 oz)   SpO2 97%   BMI 33.31 kg/m²    Constitutional: Well developed, Well nourished, No acute distress, Non-toxic appearance.   HENT: Normocephalic, Atraumatic,  Eyes: PERRL, EOM intact  Neck: Supple, no meningismus  Lymphatic: No lymphadenopathy noted.   Cardiovascular: Regular rate and rhythm  Lungs: Clear to auscultation bilaterally, easy unlabored respirations   Abdomen: Bowel sounds normal, Soft, No tenderness  Skin: Warm, Dry, no rash  Back: No tenderness, No CVA tenderness.   Extremities: No edema to lower extremities  Neurologic: Alert and oriented, appropriate, follows commands, moving all extremities, normal speech   Psychiatric: Affect normal      COURSE & MEDICAL DECISION MAKING  Pertinent Labs & Imaging studies reviewed. (See chart for details)    Patient with no adnexal tenderness and an abdominal exam is entirely benign, I believe that ovarian torsion is highly likely this well-appearing patient.  I believe abdominal surgical process is highly unlikely.  I sent off urine chlamydia via urine PCR lower bleed is unlikely.  Patient's symptoms are identical to prior episodes of dysmenorrhea secondary to known endometriosis and uterine fibroids.  Patient has follow-up with Gyn in 3 days.  Will give analgesics to cover patient for this period of time.    4:39 PM Patient seen and examined at bedside. The patient presents with pelvic pain. Ordered for UA, Chlamydia urine swab, and urine preg to evaluate. Patient will be treated with Fentanyl 50 mcg IM for her symptoms. The patient was made aware of her plan of care and was agreeable at this time.     4:55 PM I spoke with the patient about the risks of narcotic use for her pain including the risks of dependence, addiction, and death. I spoke to her about how I am concerned for use of narcotic use for her pain.     I reviewed the patients medical history and she has " multiple allergies to NSAID alagesics. I gave narcotics and fully reviewed her history.     I reviewed prescription monitoring program for patient's narcotic use before prescribing a scheduled drug.The patient will not drink alcohol nor drive with prescribed medications. The patient will return for new or worsening symptoms and is stable at the time of discharge.    The patient is referred to a primary physician for blood pressure management, diabetic screening, and for all other preventative health concerns.    In prescribing controlled substances to this patient, I certify that I have obtained and reviewed the medical history of Lizz Storey. I have also made a good jaylon effort to obtain applicable records from other providers who have treated the patient and records demonstrating the following: Patient is at risk for abuse however she has multiple allergies to NSAIDs, I discussed in depth my concern about this but we have agreed to give a short course the patient understands to follow-up with her primary care physician..     I have conducted a physical exam and documented it. I have reviewed Ms. Storye’s prescription history as maintained by the [place] Prescription Monitoring Program.     I have assessed the patient’s risk for abuse, dependency, and addiction using the validated Opioid Risk Tool available at https://www.mdcalc.com/uqfvxq-pwkb-kkwo-ort-narcotic-abuse.     Given the above, I believe the benefits of controlled substance therapy outweigh the risks. The reasons for prescribing controlled substances include non-narcotic, oral analgesic alternatives are contraindicated. Accordingly, I have discussed the risk and benefits, treatment plan, and alternative therapies with the patient.         DISPOSITION:  Patient will be discharged home in stable condition.    FOLLOW UP:  No follow-up provider specified.    OUTPATIENT MEDICATIONS:  New Prescriptions    OXYCODONE-ACETAMINOPHEN (PERCOCET) 5-325 MG  TAB    Take 1-2 Tabs by mouth every four hours as needed for Severe Pain for up to 3 days.         FINAL IMPRESSION   (N94.6) Dysmenorrhea  (R10.2,  G89.29) Chronic pelvic pain in female     IChuy (Scribe), am scribing for, and in the presence of, Jt Mccloud M.D..    Electronically signed by: Chuy Mendoza (Scribe), 8/7/2018    IJt M.D. personally performed the services described in this documentation, as scribed by Chuy Mendoza in my presence, and it is both accurate and complete.    The note accurately reflects work and decisions made by me.  Jt Mccloud  8/7/2018  5:42 PM

## 2018-08-07 NOTE — ED TRIAGE NOTES
C/o pelvic pain, hx of fibroids and endometriosis, takes Luprons and it has helped, began last Wednesday, spoke w Dr Hinson, hs appt Friday but pain to much, usually gets ultrasound and pain med

## 2018-08-08 LAB
C TRACH DNA SPEC QL NAA+PROBE: NEGATIVE
N GONORRHOEA DNA SPEC QL NAA+PROBE: NEGATIVE
SPECIMEN SOURCE: NORMAL

## 2018-08-08 NOTE — ED NOTES
Patient verbalized understanding of dc and follow up, no acute distress noted, patient stable for discharge, prescriptions x 1 given.

## 2018-09-26 ENCOUNTER — PATIENT MESSAGE (OUTPATIENT)
Dept: MEDICAL GROUP | Facility: MEDICAL CENTER | Age: 33
End: 2018-09-26

## 2018-09-26 DIAGNOSIS — R30.0 DYSURIA: ICD-10-CM

## 2018-09-28 RX ORDER — PROMETHAZINE HYDROCHLORIDE 25 MG/1
TABLET ORAL
Refills: 0 | OUTPATIENT
Start: 2018-09-28

## 2018-10-19 ENCOUNTER — PATIENT MESSAGE (OUTPATIENT)
Dept: MEDICAL GROUP | Facility: MEDICAL CENTER | Age: 33
End: 2018-10-19

## 2018-10-19 ENCOUNTER — HOSPITAL ENCOUNTER (OUTPATIENT)
Facility: MEDICAL CENTER | Age: 33
End: 2018-10-19
Attending: NURSE PRACTITIONER
Payer: COMMERCIAL

## 2018-10-19 DIAGNOSIS — R10.9 FLANK PAIN: ICD-10-CM

## 2018-10-19 DIAGNOSIS — R30.0 DYSURIA: ICD-10-CM

## 2018-10-19 PROCEDURE — 87086 URINE CULTURE/COLONY COUNT: CPT

## 2018-10-19 RX ORDER — NITROFURANTOIN 25; 75 MG/1; MG/1
100 CAPSULE ORAL 2 TIMES DAILY
Qty: 10 CAP | Refills: 0 | Status: SHIPPED | OUTPATIENT
Start: 2018-10-19 | End: 2018-10-22

## 2018-10-19 RX ORDER — OXYCODONE HYDROCHLORIDE AND ACETAMINOPHEN 5; 325 MG/1; MG/1
1 TABLET ORAL EVERY 8 HOURS PRN
Qty: 15 TAB | Refills: 0 | Status: SHIPPED | OUTPATIENT
Start: 2018-10-19 | End: 2018-10-22 | Stop reason: SDUPTHER

## 2018-10-19 RX ORDER — PHENAZOPYRIDINE HYDROCHLORIDE 200 MG/1
200 TABLET, FILM COATED ORAL 3 TIMES DAILY PRN
Qty: 6 TAB | Refills: 0 | Status: SHIPPED | OUTPATIENT
Start: 2018-10-19 | End: 2019-05-14

## 2018-10-19 NOTE — TELEPHONE ENCOUNTER
From: Lizz Storey  To: KIRAN Yuen  Sent: 10/19/2018 2:30 PM PDT  Subject: RE:medication    THANK YOU SO MUCH!!! I HOPE YOU HAVE A GREAT WEEKEND AND THANK YOU AGAIN.. ps when do you move offices?..  ----- Message -----  From: KIRAN Yuen  Sent: 10/19/2018 2:21 PM PDT  To: Lizz Storey  Subject: RE:medication  I will print a letter and prescription for you to  from the . I'll let you know when I get culture results, probably Monday  Sharon LUIS      ----- Message -----   From: Lizz Storey   Sent: 10/19/2018 2:06 PM PDT   To: KIRAN Yuen  Subject: RE:medication    i dont know if my other message went through.. but yes if you could send in those scripts but could i also be able to get a pain med for the weekend for the pain as well as a Dr note from work? after i left your office i went home and called in from the pain being so high.. but again i do want to thank you tyree for at least doing the dip since i came in late..   ----- Message -----  From: KIRAN Yuen  Sent: 10/19/2018 12:56 PM PDT  To: Lizz Storey  Subject: RE:medication  Samuel Zamudio,  I'm sorry there was a mix up with your appointment time. I did get your urine test result and it looks suspicious for infection, again. I have sent an antibiotic to your pharmacy. I can also send in pyridium which helps to stop the pain with urination. Would you like that prescription sent?  Sharon LUIS      ----- Message -----   From: Lizz Storey   Sent: 10/19/2018 12:23 PM PDT   To: KIRAN Yuen  Subject: RE:medication    i am.. and i was just in there for what i was told was an 1145 check in and when i got there i was told i had missed it.. i am having HORRIBLE pain since last night when i pee and i believe it is the same issue.. i have an ultra sound apt Monday & due to my job relocating me over the summer i wasn't able to do it then but since its happening  again i made that when i made my apt with you.. i just am at a loss for words and am hurting. so please let me know after you see this. your MA did collect it while i was there at least!   ----- Message -----  From: KIRAN Yuen  Sent: 9/26/2018 3:41 PM PDT  To: Lizz Storey  Subject: medication  Hi Lizz,   I received the medication refill request for Phenergan. Are you still using this medication?  Sharon LUIS

## 2018-10-19 NOTE — LETTER
October 19, 2018        RE: Lizz Storey    To whom it may concern;    Lizz Storey is seen in my office for primary care.  Please excuse her work absence 10/19/18 due to illness.      Sincerely,                    Poly LUIS

## 2018-10-19 NOTE — TELEPHONE ENCOUNTER
From: Lizz Storey  To: KIRAN Yuen  Sent: 10/19/2018 2:06 PM PDT  Subject: RE:medication    i dont know if my other message went through.. but yes if you could send in those scripts but could i also be able to get a pain med for the weekend for the pain as well as a Dr note from work? after i left your office i went home and called in from the pain being so high.. but again i do want to thank you guglenny for at least doing the dip since i came in late..   ----- Message -----  From: KIRAN Yuen  Sent: 10/19/2018 12:56 PM PDT  To: Lizz Storey  Subject: RE:medication  Samuel Zamudio,  I'm sorry there was a mix up with your appointment time. I did get your urine test result and it looks suspicious for infection, again. I have sent an antibiotic to your pharmacy. I can also send in pyridium which helps to stop the pain with urination. Would you like that prescription sent?  Sharon LUIS      ----- Message -----   From: Lizz Storey   Sent: 10/19/2018 12:23 PM PDT   To: KIRAN Yuen  Subject: RE:medication    i am.. and i was just in there for what i was told was an 1145 check in and when i got there i was told i had missed it.. i am having HORRIBLE pain since last night when i pee and i believe it is the same issue.. i have an ultra sound apt Monday & due to my job relocating me over the summer i wasn't able to do it then but since its happening again i made that when i made my apt with you.. i just am at a loss for words and am hurting. so please let me know after you see this. your MA did collect it while i was there at least!   ----- Message -----  From: KIRAN Yuen  Sent: 9/26/2018 3:41 PM PDT  To: Lizz Storey  Subject: medication  Hi Lizz,   I received the medication refill request for Phenergan. Are you still using this medication?  Sharon LUIS

## 2018-10-22 ENCOUNTER — PATIENT MESSAGE (OUTPATIENT)
Dept: MEDICAL GROUP | Facility: MEDICAL CENTER | Age: 33
End: 2018-10-22

## 2018-10-22 ENCOUNTER — OFFICE VISIT (OUTPATIENT)
Dept: MEDICAL GROUP | Facility: MEDICAL CENTER | Age: 33
End: 2018-10-22
Payer: COMMERCIAL

## 2018-10-22 VITALS
DIASTOLIC BLOOD PRESSURE: 68 MMHG | TEMPERATURE: 97.4 F | WEIGHT: 184 LBS | HEART RATE: 60 BPM | HEIGHT: 63 IN | SYSTOLIC BLOOD PRESSURE: 102 MMHG | OXYGEN SATURATION: 96 % | BODY MASS INDEX: 32.6 KG/M2

## 2018-10-22 DIAGNOSIS — R10.9 LEFT FLANK PAIN: ICD-10-CM

## 2018-10-22 LAB
BACTERIA UR CULT: NORMAL
SIGNIFICANT IND 70042: NORMAL
SITE SITE: NORMAL
SOURCE SOURCE: NORMAL

## 2018-10-22 PROCEDURE — 99214 OFFICE O/P EST MOD 30 MIN: CPT | Performed by: NURSE PRACTITIONER

## 2018-10-22 RX ORDER — OXYCODONE HYDROCHLORIDE AND ACETAMINOPHEN 5; 325 MG/1; MG/1
1-2 TABLET ORAL EVERY 8 HOURS PRN
Qty: 30 TAB | Refills: 0 | Status: SHIPPED | OUTPATIENT
Start: 2018-10-22 | End: 2018-10-27

## 2018-10-22 NOTE — TELEPHONE ENCOUNTER
From: Lizz Storey  To: KIRAN Yuen  Sent: 10/22/2018 8:11 AM PDT  Subject: RE:medication    good morning dr weaver.So i called in to work again because im having alot of pain still.. how long does it usually take for the antibiotic to khang kick in?i have 3 pain pills left becasue i was taking 1.5-2 every 6-7 hrs rather than the 8. imaging called and said that my insurance has approved the ultrasound but had to be pushed back to this fri (7days) and i have a $100 copay on imaging so the friday issue works better since i get paid thursday! should i make an inperson apt with you for this week or just wait till fridays results?..i know before we were thinking to do a referral to a urologist. also can you call in that phenergan? i do appreciate being able to email my dr!!! i will also be following you to your new office!!   ----- Message -----  From: KIRAN Yuen  Sent: 10/19/2018 3:03 PM PDT  To: Lizz Storey  Subject: RE:medication  2 weeks.    ----- Message -----   From: Lizz Storey   Sent: 10/19/2018 2:30 PM PDT   To: KIRAN Yuen  Subject: RE:medication    THANK YOU SO MUCH!!! I HOPE YOU HAVE A GREAT WEEKEND AND THANK YOU AGAIN.. ps when do you move offices?..  ----- Message -----  From: KIRAN Yuen  Sent: 10/19/2018 2:21 PM PDT  To: Lizz Storey  Subject: RE:medication  I will print a letter and prescription for you to  from the . I'll let you know when I get culture results, probably Monday  Sharon LUIS      ----- Message -----   From: Lizz Storey   Sent: 10/19/2018 2:06 PM PDT   To: KIRAN Yuen  Subject: RE:medication    i dont know if my other message went through.. but yes if you could send in those scripts but could i also be able to get a pain med for the weekend for the pain as well as a Dr note from work? after i left your office i went home and called in from the pain being so high.. but again i  do want to thank you tyree for at least doing the dip since i came in late..   ----- Message -----  From: KIRAN Yuen  Sent: 10/19/2018 12:56 PM PDT  To: Lizz Storey  Subject: RE:medication  Hi Lizz,  I'm sorry there was a mix up with your appointment time. I did get your urine test result and it looks suspicious for infection, again. I have sent an antibiotic to your pharmacy. I can also send in pyridium which helps to stop the pain with urination. Would you like that prescription sent?  Sharon LUIS      ----- Message -----   From: Lizz Storey   Sent: 10/19/2018 12:23 PM PDT   To: KIRAN Yuen  Subject: RE:medication    i am.. and i was just in there for what i was told was an 1145 check in and when i got there i was told i had missed it.. i am having HORRIBLE pain since last night when i pee and i believe it is the same issue.. i have an ultra sound apt Monday & due to my job relocating me over the summer i wasn't able to do it then but since its happening again i made that when i made my apt with you.. i just am at a loss for words and am hurting. so please let me know after you see this. your MA did collect it while i was there at least!   ----- Message -----  From: KIRAN Yuen  Sent: 9/26/2018 3:41 PM PDT  To: Lizz Storey  Subject: medication  Hi Lizz,   I received the medication refill request for Phenergan. Are you still using this medication?  Sharon LUIS

## 2018-10-22 NOTE — TELEPHONE ENCOUNTER
From: Lizz Storey  To: KIRAN Yuen  Sent: 10/22/2018 10:33 AM PDT  Subject: RE:medication    and did you say you can? or cant refill without seeing me?.. im alil confused, last time this happend you refilled once... but i know things have changed...  ----- Message -----  From: KIRAN Yuen  Sent: 10/22/2018 9:32 AM PDT  To: Lizz Storey  Subject: RE:medication  Lizz,  You had an appointment today but it appears to have been cancelled. I can refill medication again without seeing you. If you are in severe pain it may be better to go to ER rather than wait until Friday for an ultrasound. Your urine test showed no infection.  Sharon LUIS      ----- Message -----   From: Lizz Storey   Sent: 10/22/2018 8:11 AM PDT   To: KIRAN Yuen  Subject: RE:medication    good morning dr weaver.So i called in to work again because im having alot of pain still.. how long does it usually take for the antibiotic to khang kick in?i have 3 pain pills left becasue i was taking 1.5-2 every 6-7 hrs rather than the 8. imaging called and said that my insurance has approved the ultrasound but had to be pushed back to this fri (7days) and i have a $100 copay on imaging so the friday issue works better since i get paid thursday! should i make an inperson apt with you for this week or just wait till fridays results?..i know before we were thinking to do a referral to a urologist. also can you call in that phenergan? i do appreciate being able to email my dr!!! i will also be following you to your new office!!   ----- Message -----  From: KIRAN Yuen  Sent: 10/19/2018 3:03 PM PDT  To: Lizz Storey  Subject: RE:medication  2 weeks.    ----- Message -----   From: Lizz Storey   Sent: 10/19/2018 2:30 PM PDT   To: KIRAN Yuen  Subject: RE:medication    THANK YOU SO MUCH!!! I HOPE YOU HAVE A GREAT WEEKEND AND THANK YOU AGAIN.. ps when do you move  offices?..  ----- Message -----  From: KIRAN Yuen  Sent: 10/19/2018 2:21 PM PDT  To: Lizz Storey  Subject: RE:medication  I will print a letter and prescription for you to  from the . I'll let you know when I get culture results, probably Monday  Sharon Mcdowell APRN      ----- Message -----   From: Lizz Storey   Sent: 10/19/2018 2:06 PM PDT   To: KIRAN Yuen  Subject: RE:medication    i dont know if my other message went through.. but yes if you could send in those scripts but could i also be able to get a pain med for the weekend for the pain as well as a Dr note from work? after i left your office i went home and called in from the pain being so high.. but again i do want to thank you tyree for at least doing the dip since i came in late..   ----- Message -----  From: KIRAN Yuen  Sent: 10/19/2018 12:56 PM PDT  To: Lizz Storey  Subject: RE:medication  Samuel Zamudio,  I'm sorry there was a mix up with your appointment time. I did get your urine test result and it looks suspicious for infection, again. I have sent an antibiotic to your pharmacy. I can also send in pyridium which helps to stop the pain with urination. Would you like that prescription sent?  Sharon Mcdowell APRDARREN      ----- Message -----   From: Lizz Storey   Sent: 10/19/2018 12:23 PM PDT   To: KIRAN Yuen  Subject: RE:medication    i am.. and i was just in there for what i was told was an 1145 check in and when i got there i was told i had missed it.. i am having HORRIBLE pain since last night when i pee and i believe it is the same issue.. i have an ultra sound apt Monday & due to my job relocating me over the summer i wasn't able to do it then but since its happening again i made that when i made my apt with you.. i just am at a loss for words and am hurting. so please let me know after you see this. your MA did collect it while i was there at least!   ----- Message  -----  From: KIRAN Yuen  Sent: 9/26/2018 3:41 PM PDT  To: Lizz Storey  Subject: medication  Hi Lizz   I received the medication refill request for Phenergan. Are you still using this medication?  Sharon LUIS

## 2018-10-23 NOTE — PROGRESS NOTES
Subjective:     Chief Complaint   Patient presents with   • Follow-Up     possible infction, pain urinating     Lizz Storey is a 33 y.o. female here today to follow up on:    Left flank pain  L flank pain starting 5 days ago. Has appt last Friday but misunderstood appt time and came late. UA was collected that day which was positive for blood and leukocytes, she was started on Macrobid.  Culture results showed no infection.  She continues to have symptoms  Pain is ranging from 5-7 out of 10, in the left flank region with radiation toward the pelvis.  Exacerbated by urination. UA in the office today shows  Large blood, negative for leukocytes and nitrates, trace protein, trace ketones.  She has missed 2 days of work.  She has some mild nausea, no vomiting  She had a similar episode in June of this year, renal imaging was ordered at the time but due to scheduling difficulty she never completed this.  Her symptoms eventually resolved  She is currently taking Percocet 5-325 1-2 tablets 3 times daily, she does report improvement in pain with medication.  She does have history of narcotic dependence, states that her mother is helping her manage the medication.  Denies any side effects related to medication including constipation   Denies fever, chills, difficulty emptying bladder, constipation       Current medicines (including changes today)  Current Outpatient Prescriptions   Medication Sig Dispense Refill   • oxyCODONE-acetaminophen (PERCOCET) 5-325 MG Tab Take 1-2 Tabs by mouth every 8 hours as needed for up to 5 days. 30 Tab 0   • phenazopyridine (PYRIDIUM) 200 MG Tab Take 1 Tab by mouth 3 times a day as needed. 6 Tab 0   • promethazine (PHENERGAN) 25 MG Tab Take 1 Tab by mouth every 6 hours as needed for Nausea/Vomiting. 30 Tab 0   • ondansetron (ZOFRAN ODT) 4 MG TABLET DISPERSIBLE Take 1 Tab by mouth every 6 hours as needed for Nausea. 30 Tab 2   • budesonide-formoterol (SYMBICORT) 80-4.5 MCG/ACT Aerosol  "Inhale 2 Puffs by mouth 2 Times a Day. 1 Inhaler 5   • albuterol 108 (90 Base) MCG/ACT Aero Soln inhalation aerosol Inhale 2 Puffs by mouth every 6 hours as needed for Shortness of Breath. 8.5 g 3   • ipratropium-albuterol (DUONEB) 0.5-2.5 (3) MG/3ML nebulizer solution 3 mL by Nebulization route every four hours as needed for Shortness of Breath. 30 Bullet 0   • albuterol 108 (90 BASE) MCG/ACT Aero Soln inhalation aerosol Inhale 2 Puffs by mouth every 6 hours as needed for Shortness of Breath. 1 Inhaler 0   • leuprolide (LUPRON PEDIATRIC) 11.25 MG Kit 11.25 mg by Intramuscular route every 90 days.     • acyclovir (ZOVIRAX) 400 MG tablet Take 400 mg by mouth every day.       No current facility-administered medications for this visit.      She  has a past medical history of ASTHMA; Cyst of ovary, left; Endometriosis; Fibroid, uterus; Genital herpes in women; HPV in female; Kidney infection; and Psychiatric disorder. She also has no past medical history of CAD (coronary artery disease); Cancer (HCC); Congestive heart failure (HCC); COPD; Diabetes; Hypertension; Liver disease; Seizure disorder (HCC); or Stroke (HCC).    ROS included above     Objective:     Blood pressure 102/68, pulse 60, temperature 36.3 °C (97.4 °F), temperature source Temporal, height 1.6 m (5' 3\"), weight 83.5 kg (184 lb), SpO2 96 %, not currently breastfeeding. Body mass index is 32.59 kg/m².     Physical Exam:  General: Alert, oriented in no acute distress.  Eye contact is good, speech is normal, affect calm   Lungs: clear to auscultation bilaterally, normal effort, no wheeze/ rhonchi/ rales.  CV: regular rate and rhythm, S1, S2, no murmur  Abdomen: soft, mildly tender in left mid abdomen.  No distention.  Positive for left CVA T  Ext: no edema, color normal, vascularity normal, temperature normal    Assessment and Plan:   The following treatment plan was discussed   1. Left flank pain   flank pain times 5 days, large blood in the urine.  " Culture completed and negative.  This is the patient's second episode causing acute pain, loss of work, need for opiate pain medication.  She needs imaging to identify or rule out stone.  Stat ultrasound requested.  I have provided her with a refill of Percocet today.   report reviewed.  She does have history of drug abuse, we have discussed plan for medication management and safe use.  Consent for opiate prescription reviewed and signed.  I will contact her with test results  US-RENAL    oxyCODONE-acetaminophen (PERCOCET) 5-325 MG Tab    Consent for Opiate Prescription       Followup: Pending imaging         Please note that this dictation was created using voice recognition software. I have worked with consultants from the vendor as well as technical experts from [facility] to optimize the interface. I have made every reasonable attempt to correct obvious errors, but I expect that there are errors of grammar and possibly content that I did not discover before finalizing the note.

## 2018-10-23 NOTE — ASSESSMENT & PLAN NOTE
L flank pain starting 5 days ago. Has appt last Friday but misunderstood appt time and came late. UA was collected that day which was positive for blood and leukocytes, she was started on Macrobid.  Culture results showed no infection.  She continues to have symptoms  Pain is ranging from 5-7 out of 10, in the left flank region with radiation toward the pelvis.  Exacerbated by urination. UA in the office today shows  Large blood, negative for leukocytes and nitrates, trace protein, trace ketones.  She has missed 2 days of work.  She has some mild nausea, no vomiting  She had a similar episode in June of this year, renal imaging was ordered at the time but due to scheduling difficulty she never completed this.  Her symptoms eventually resolved  She is currently taking Percocet 5-325 1-2 tablets 3 times daily, she does report improvement in pain with medication.  She does have history of narcotic dependence, states that her mother is helping her manage the medication.  Denies any side effects related to medication including constipation   Denies fever, chills, difficulty emptying bladder, constipation

## 2018-12-10 ENCOUNTER — PATIENT MESSAGE (OUTPATIENT)
Dept: MEDICAL GROUP | Facility: MEDICAL CENTER | Age: 33
End: 2018-12-10

## 2018-12-11 ENCOUNTER — PATIENT MESSAGE (OUTPATIENT)
Dept: MEDICAL GROUP | Facility: MEDICAL CENTER | Age: 33
End: 2018-12-11

## 2018-12-11 NOTE — TELEPHONE ENCOUNTER
From: Lizz Storey  To: KIRAN Yuen  Sent: 12/11/2018 3:03 PM PST  [facility]    [facility] because i thought i got an earlier one today!! again my fault for not double checking the DATE and not just the DAY!! what time would you like me to be there??..   ----- Message -----  From: KIRAN Yuen  Sent: 12/11/2018 2:35 PM PST  To: Lizz Storey  Subject: RE: Prescription Question  It looks like you had an appointment tomorrow that was cancelled? We can fit you in sometime tomorrow. If that doesn't work I would suggest urgent care to get an evaluation.  Sharon LUIS      ----- Message -----   From: Lizz Storey   Sent: 12/11/2018 2:10 PM PST   To: KIRAN Yuen  Subject: Prescription Question    ok... a little irritatted lol. so i made an apt online and put in ONE date which was todays date and had some times pop up and selected one.. since i had put the specific date i ASSUMED it was that date, well i assumed wrong and found out when i came to the office today for my apt at 1pm.. but you dont have any apts till mid-Jan! tyrone what to do NOW!! im not dying, but i am having Horrible pain and cant get into the Gyno around the same date... i have a boil or some sort of hard bubble on my private area... i do have hpv w/herpes & take Acyclovir EVERYDAY & know what an outbreak is.. This is NOT that! plus the pain is ridiculous! what do i do?...sorry for bothering but im in pain& VERY embarrassed!!   ----- Message -----  From: KIRAN Yuen  Sent: 12/10/2018 12:46 PM PST  To: Lizz Storey  Subject: RE: Prescription Question  We do not carry any samples in our office but we do have a pharmacy that sometimes has samples available.  Sharno LUIS      ----- Message -----   From: Lizz Storey   Sent: 12/10/2018 11:15 AM PST   To: KIRAN Yuen  Subject: Prescription Question    Hey Dr Mcdowell, i have  an apt with you tomorrow but i wanted to ask you before i came in do you guys have samples for asthma issues? im coming in for another reason, but i also wanted to cover the asthma issue since its that time of year that im usually hospitalized for it and am trying to get a !...

## 2018-12-11 NOTE — TELEPHONE ENCOUNTER
From: Lizz Storey  To: KIRAN Yuen  Sent: 12/11/2018 2:10 PM PST  Subject: Prescription Question    ok... a little irritatted lol. so i made an apt online and put in ONE date which was todays date and had some times pop up and selected one.. since i had put the specific date i ASSUMED it was that date, well i assumed wrong and found out when i came to the office today for my apt at 1pm.. but you dont have any apts till mid-Jan! tyrone what to do NOW!! im not dying, but i am having Horrible pain and cant get into the Gyno around the same date... i have a boil or some sort of hard bubble on my private area... i do have hpv w/herpes & take Acyclovir EVERYDAY & know what an outbreak is.. This is NOT that! plus the pain is ridiculous! what do i do?...sorry for bothering but im in pain& VERY embarrassed!!   ----- Message -----  From: KIRAN Yuen  Sent: 12/10/2018 12:46 PM PST  To: Lizz Storey  Subject: RE: Prescription Question  We do not carry any samples in our office but we do have a pharmacy that sometimes has samples available.  Sharon LUIS      ----- Message -----   From: Lizz Storey   Sent: 12/10/2018 11:15 AM PST   To: KIRAN Yuen  Subject: Prescription Question    Marek Mcdowell, i have an apt with you tomorrow but i wanted to ask you before i came in do you guys have samples for asthma issues? im coming in for another reason, but i also wanted to cover the asthma issue since its that time of year that im usually hospitalized for it and am trying to get a !...

## 2018-12-12 ENCOUNTER — OFFICE VISIT (OUTPATIENT)
Dept: MEDICAL GROUP | Facility: MEDICAL CENTER | Age: 33
End: 2018-12-12
Payer: COMMERCIAL

## 2018-12-12 VITALS
SYSTOLIC BLOOD PRESSURE: 110 MMHG | BODY MASS INDEX: 31.89 KG/M2 | HEART RATE: 104 BPM | TEMPERATURE: 96.8 F | OXYGEN SATURATION: 96 % | DIASTOLIC BLOOD PRESSURE: 90 MMHG | WEIGHT: 180 LBS | RESPIRATION RATE: 16 BRPM | HEIGHT: 63 IN

## 2018-12-12 DIAGNOSIS — R10.2 VAGINAL PAIN: ICD-10-CM

## 2018-12-12 DIAGNOSIS — N90.7 LABIAL CYST: ICD-10-CM

## 2018-12-12 PROCEDURE — 99214 OFFICE O/P EST MOD 30 MIN: CPT | Performed by: NURSE PRACTITIONER

## 2018-12-12 RX ORDER — SULFAMETHOXAZOLE AND TRIMETHOPRIM 800; 160 MG/1; MG/1
1 TABLET ORAL 2 TIMES DAILY
Qty: 14 TAB | Refills: 0 | Status: SHIPPED | OUTPATIENT
Start: 2018-12-12 | End: 2019-01-29

## 2018-12-12 NOTE — PROGRESS NOTES
Subjective:     Chief Complaint   Patient presents with            • Other     SOMETHING HURTING IN PELVIC AREA     Lizz Storey is a 33 y.o. female established patient here for evaluation of a painful lump in the vaginal area.  This started 5 days ago, has not gotten any larger but is also not improved.  She is having significant pain, either with wiping or if she sits down a certain way.  She has been sitting on a heating pad which is helping she, she is also taking ibuprofen.  She has an appointment with her OB/GYN first available was 2 weeks from now.  She does have history of HSV, she is on suppressive therapy with acyclovir.  This pain is different than what she has experienced with HSV breakouts.  She has not been sexually active.  Denies vaginal discharge, bleeding, fever, chills, nausea    No problem-specific Assessment & Plan notes found for this encounter.       Current medicines (including changes today)  Current Outpatient Prescriptions   Medication Sig Dispense Refill   • sulfamethoxazole-trimethoprim (BACTRIM DS) 800-160 MG tablet Take 1 Tab by mouth 2 times a day. 14 Tab 0   • promethazine (PHENERGAN) 25 MG Tab Take 1 Tab by mouth every 6 hours as needed for Nausea/Vomiting. 30 Tab 0   • ondansetron (ZOFRAN ODT) 4 MG TABLET DISPERSIBLE Take 1 Tab by mouth every 6 hours as needed for Nausea. 30 Tab 2   • budesonide-formoterol (SYMBICORT) 80-4.5 MCG/ACT Aerosol Inhale 2 Puffs by mouth 2 Times a Day. 1 Inhaler 5   • albuterol 108 (90 Base) MCG/ACT Aero Soln inhalation aerosol Inhale 2 Puffs by mouth every 6 hours as needed for Shortness of Breath. 8.5 g 3   • ipratropium-albuterol (DUONEB) 0.5-2.5 (3) MG/3ML nebulizer solution 3 mL by Nebulization route every four hours as needed for Shortness of Breath. 30 Bullet 0   • albuterol 108 (90 BASE) MCG/ACT Aero Soln inhalation aerosol Inhale 2 Puffs by mouth every 6 hours as needed for Shortness of Breath. 1 Inhaler 0   • leuprolide (LUPRON  "PEDIATRIC) 11.25 MG Kit 11.25 mg by Intramuscular route every 90 days.     • acyclovir (ZOVIRAX) 400 MG tablet Take 400 mg by mouth every day.     • phenazopyridine (PYRIDIUM) 200 MG Tab Take 1 Tab by mouth 3 times a day as needed. (Patient not taking: Reported on 12/12/2018) 6 Tab 0     No current facility-administered medications for this visit.      She  has a past medical history of ASTHMA; Cyst of ovary, left; Endometriosis; Fibroid, uterus; Genital herpes in women; HPV in female; Kidney infection; and Psychiatric disorder. She also has no past medical history of CAD (coronary artery disease); Cancer (HCC); Congestive heart failure (HCC); COPD; Diabetes; Hypertension; Liver disease; Seizure disorder (HCC); or Stroke (HCC).    ROS included above     Objective:     Blood pressure 110/90, pulse (!) 104, temperature 36 °C (96.8 °F), temperature source Temporal, resp. rate 16, height 1.6 m (5' 3\"), weight 81.6 kg (180 lb), SpO2 96 %, not currently breastfeeding. Body mass index is 31.89 kg/m².     Physical Exam:  General: Alert, oriented in no acute distress.  Eye contact is good, speech is normal, affect calm  Lungs: clear to auscultation bilaterally, normal effort, no wheeze/ rhonchi/ rales.  CV: regular rate and rhythm, S1, S2, no murmur  Pelvic: external genitalia pink, moist.  Small, approximately 1 cm firm and tender nodule along the left labia minora, mild erythema.  No drainage.  No inguinal lymphadenopathy  Ext: no edema, color normal, vascularity normal, temperature normal    Assessment and Plan:   The following treatment plan was discussed  1. Labial cyst   small cyst along the left labia minora, possibly infected given significant pain and erythema.  This is not large enough that it warrants incision and drainage in the office.  I will have her do sitz baths, warm compresses, continue ibuprofen as needed.  Will cover for potential bacterial infection with Bactrim.  She will contact me with an update in " 2-3 days   2. Vaginal pain  sulfamethoxazole-trimethoprim (BACTRIM DS) 800-160 MG tablet       Followup: 3 days, sooner as needed       Please note that this dictation was created using voice recognition software. I have worked with consultants from the vendor as well as technical experts from [facility] to optimize the interface. I have made every reasonable attempt to correct obvious errors, but I expect that there are errors of grammar and possibly content that I did not discover before finalizing the note.

## 2018-12-13 ENCOUNTER — PATIENT MESSAGE (OUTPATIENT)
Dept: MEDICAL GROUP | Facility: MEDICAL CENTER | Age: 33
End: 2018-12-13

## 2018-12-13 DIAGNOSIS — R10.2 VAGINAL PAIN: ICD-10-CM

## 2018-12-14 RX ORDER — OXYCODONE HYDROCHLORIDE AND ACETAMINOPHEN 5; 325 MG/1; MG/1
1 TABLET ORAL EVERY 8 HOURS PRN
Qty: 15 TAB | Refills: 0 | Status: SHIPPED | OUTPATIENT
Start: 2018-12-14 | End: 2019-01-29 | Stop reason: SDUPTHER

## 2019-01-28 ENCOUNTER — PATIENT MESSAGE (OUTPATIENT)
Dept: MEDICAL GROUP | Facility: MEDICAL CENTER | Age: 34
End: 2019-01-28

## 2019-01-28 DIAGNOSIS — R10.9 CHRONIC LEFT FLANK PAIN: ICD-10-CM

## 2019-01-28 DIAGNOSIS — G89.29 CHRONIC LEFT FLANK PAIN: ICD-10-CM

## 2019-01-28 DIAGNOSIS — R31.9 HEMATURIA, UNSPECIFIED TYPE: ICD-10-CM

## 2019-01-29 ENCOUNTER — OFFICE VISIT (OUTPATIENT)
Dept: MEDICAL GROUP | Facility: MEDICAL CENTER | Age: 34
End: 2019-01-29
Payer: COMMERCIAL

## 2019-01-29 ENCOUNTER — PATIENT MESSAGE (OUTPATIENT)
Dept: MEDICAL GROUP | Facility: MEDICAL CENTER | Age: 34
End: 2019-01-29

## 2019-01-29 VITALS
HEIGHT: 63 IN | TEMPERATURE: 97.9 F | SYSTOLIC BLOOD PRESSURE: 100 MMHG | DIASTOLIC BLOOD PRESSURE: 82 MMHG | RESPIRATION RATE: 16 BRPM | OXYGEN SATURATION: 95 % | BODY MASS INDEX: 32.78 KG/M2 | WEIGHT: 185 LBS | HEART RATE: 68 BPM

## 2019-01-29 DIAGNOSIS — R10.9 LEFT FLANK PAIN: ICD-10-CM

## 2019-01-29 DIAGNOSIS — R31.9 HEMATURIA, UNSPECIFIED TYPE: ICD-10-CM

## 2019-01-29 LAB
APPEARANCE UR: CLEAR
BILIRUB UR STRIP-MCNC: NEGATIVE MG/DL
COLOR UR AUTO: YELLOW
GLUCOSE UR STRIP.AUTO-MCNC: NEGATIVE MG/DL
KETONES UR STRIP.AUTO-MCNC: NEGATIVE MG/DL
LEUKOCYTE ESTERASE UR QL STRIP.AUTO: NEGATIVE
NITRITE UR QL STRIP.AUTO: NEGATIVE
PH UR STRIP.AUTO: NEGATIVE [PH] (ref 5–8)
PROT UR QL STRIP: NEGATIVE MG/DL
RBC UR QL AUTO: NORMAL
SP GR UR STRIP.AUTO: 1.01
UROBILINOGEN UR STRIP-MCNC: NEGATIVE MG/DL

## 2019-01-29 PROCEDURE — 81002 URINALYSIS NONAUTO W/O SCOPE: CPT | Performed by: NURSE PRACTITIONER

## 2019-01-29 PROCEDURE — 99214 OFFICE O/P EST MOD 30 MIN: CPT | Performed by: NURSE PRACTITIONER

## 2019-01-29 RX ORDER — OXYCODONE HYDROCHLORIDE AND ACETAMINOPHEN 5; 325 MG/1; MG/1
1 TABLET ORAL EVERY 8 HOURS PRN
Qty: 20 TAB | Refills: 0 | Status: SHIPPED | OUTPATIENT
Start: 2019-01-29 | End: 2019-05-15 | Stop reason: SDUPTHER

## 2019-01-29 NOTE — LETTER
5/6/2020    Lizz Storey  [address] [place] [address]      Dear Lizz Storey,    [facility] strives to provide the highest standards of excellence in meeting your health care needs. Our community is highly valued as are our patients, to whom we attempt to provide service whenever their need arises. Unfortunately, when patients schedule time with their provider and does not show up for the time set aside for them, it takes away from services which may have been offered to others.     Due to the number of appointments you have scheduled and have not shown up for, this letter is being sent to you as a reminder as well as a notice that if you miss another appointment, we may find it necessary to discontinue to serve you as your healthcare provider.     We value your confidence and trust when you place your healthcare needs with us and we do look forward to helping you maintain good health.     Sincerely,          Ruthie Teixeira MBA  Area Practice Manager

## 2019-01-29 NOTE — LETTER
January 29, 2019        RE: Lizz Storey    To Whom It May Concern;    Lizz Storey was see today in my office for a medical appointment. Please excuse her work absence 1/30/18 due to illness.    Please contact me with any questions.    Sincerely,              Poly LUIS

## 2019-01-30 NOTE — PROGRESS NOTES
Subjective:     Chief Complaint   Patient presents with   • UTI     Lizz Storey is a 33 y.o. female Established patient here again with concerns of left flank pain and hematuria.  This is been occurring intermittently every few months since June of last year.  I have ordered renal imaging to evaluate for calculi on several occasions, patient has failed to follow through with scheduling citing financial concerns.  She has been referred to urology but has not yet been seen.  Current pain started 2 days ago, is constantly a level 4 but increases to level 8 out of 10 at times with no particular trigger.  Not necessarily associated with activity or movement.  She is having some intermittent nausea, is using Zofran for this which is helping.  She has noted a pink tinge to her urine.  She denies dysuria, urinary frequency, fever  No problem-specific Assessment & Plan notes found for this encounter.       Current medicines (including changes today)  Current Outpatient Prescriptions   Medication Sig Dispense Refill   • oxyCODONE-acetaminophen (PERCOCET) 5-325 MG Tab Take 1 Tab by mouth every 8 hours as needed for up to 7 days. 20 Tab 0   • phenazopyridine (PYRIDIUM) 200 MG Tab Take 1 Tab by mouth 3 times a day as needed. 6 Tab 0   • promethazine (PHENERGAN) 25 MG Tab Take 1 Tab by mouth every 6 hours as needed for Nausea/Vomiting. 30 Tab 0   • budesonide-formoterol (SYMBICORT) 80-4.5 MCG/ACT Aerosol Inhale 2 Puffs by mouth 2 Times a Day. 1 Inhaler 5   • ipratropium-albuterol (DUONEB) 0.5-2.5 (3) MG/3ML nebulizer solution 3 mL by Nebulization route every four hours as needed for Shortness of Breath. 30 Bullet 0   • albuterol 108 (90 BASE) MCG/ACT Aero Soln inhalation aerosol Inhale 2 Puffs by mouth every 6 hours as needed for Shortness of Breath. 1 Inhaler 0   • leuprolide (LUPRON PEDIATRIC) 11.25 MG Kit 11.25 mg by Intramuscular route every 90 days.     • acyclovir (ZOVIRAX) 400 MG tablet Take 400 mg by mouth every  "day.       No current facility-administered medications for this visit.      She  has a past medical history of ASTHMA; Cyst of ovary, left; Endometriosis; Fibroid, uterus; Genital herpes in women; HPV in female; Kidney infection; and Psychiatric disorder. She also has no past medical history of CAD (coronary artery disease); Cancer (HCC); Congestive heart failure (HCC); COPD; Diabetes; Hypertension; Liver disease; Seizure disorder (HCC); or Stroke (HCC).    ROS included above     Objective:     Blood pressure 100/82, pulse 68, temperature 36.6 °C (97.9 °F), temperature source Temporal, resp. rate 16, height 1.6 m (5' 3\"), weight 83.9 kg (185 lb), SpO2 95 %, not currently breastfeeding. Body mass index is 32.77 kg/m².     Physical Exam:  General: Alert, oriented in no acute distress.  Eye contact is good, speech is normal, affect calm  Lungs: clear to auscultation bilaterally, normal effort, no wheeze/ rhonchi/ rales.  CV: regular rate and rhythm, S1, S2, no murmur  Abdomen: soft, nontender, mild left CVAT, no abdominal distention, mild suprapubic pressure  Ext: no edema, color normal, vascularity normal, temperature normal    Assessment and Plan:   The following treatment plan was discussed   1. Hematuria, unspecified type   urinalysis with 2+ blood, negative for leukocytes and nitrates.  I have again discussed with her that I suspect calculi as the cause of her intermittent pain and hematuria.  Importance of obtaining imaging reviewed, patient states that she will schedule for ultrasound.  She has already been referred to urology and will be scheduling in the near future.  Her pain is uncontrolled with over-the-counter medications, she is requesting small amount of pain medication to get her through the next few days.  I have provided her with 20 tablets of Percocet, advised to use very sparingly.  Risks benefits and side effects reviewed, consent for opiate prescription reviewed and signed.   report " reviewed.  US-RENAL    POCT Urinalysis   2. Left flank pain  US-RENAL    POCT Urinalysis    oxyCODONE-acetaminophen (PERCOCET) 5-325 MG Tab    Consent for Opiate Prescription       Followup: Pending imaging         Please note that this dictation was created using voice recognition software. I have worked with consultants from the vendor as well as technical experts from [facility] to optimize the interface. I have made every reasonable attempt to correct obvious errors, but I expect that there are errors of grammar and possibly content that I did not discover before finalizing the note.

## 2019-02-04 ENCOUNTER — PATIENT MESSAGE (OUTPATIENT)
Dept: MEDICAL GROUP | Facility: MEDICAL CENTER | Age: 34
End: 2019-02-04

## 2019-02-04 NOTE — LETTER
February 5, 2019        RE: Lizz Storey    To Whom it May Concern;    Lizz Storey is seen in my office for primary care. Please excuse her work absence 2/1/19 and 2/4/19 due to illness.    Please contact me with any questions.    Sincerely,                  Poly LUIS

## 2019-02-05 ENCOUNTER — PATIENT MESSAGE (OUTPATIENT)
Dept: MEDICAL GROUP | Facility: MEDICAL CENTER | Age: 34
End: 2019-02-05

## 2019-02-05 ENCOUNTER — APPOINTMENT (OUTPATIENT)
Dept: RADIOLOGY | Facility: MEDICAL CENTER | Age: 34
End: 2019-02-05
Attending: NURSE PRACTITIONER
Payer: COMMERCIAL

## 2019-02-05 NOTE — TELEPHONE ENCOUNTER
From: Lizz Storey  To: KIRAN Yuen  Sent: 2/4/2019 9:06 AM PST  Subject: Non-Urgent Medical Question    G Morning, i have my ultrasound tomorrow but my employer is needing a note for friday and today.. i have come to work every day but the 1st and am needing to leave early today ...

## 2019-02-05 NOTE — TELEPHONE ENCOUNTER
From: Lizz Storey  To: KIRAN Yuen  Sent: 2/5/2019 9:15 AM PST  Subject: Non-Urgent Medical Question    THANK YOU! Im sorry to hear you yourself are ill! i hope you get better soon! i have my apt for sat but its $400! i have enough but not till Saturday that's why its booked that far i have half but the other not till friday... .. i honestly don't know what to do and i still have PAIN!!! Im struggling at work today and may go home early...  ----- Message -----  From: KIRAN Yuen  Sent: 2/5/2019 7:10 AM PST  To: Lizz Storey  Subject: RE: Non-Urgent Medical Question  Lizz,  Your letter is ready for .  Sharon LUIS      ----- Message -----   From: Lizz Storey   Sent: 2/4/2019 9:06 AM PST   To: KIRAN Yuen  Subject: Non-Urgent Medical Question    G Morning, i have my ultrasound tomorrow but my employer is needing a note for friday and today.. i have come to work every day but the 1st and am needing to leave early today ...

## 2019-02-22 ENCOUNTER — APPOINTMENT (OUTPATIENT)
Dept: MEDICAL GROUP | Facility: MEDICAL CENTER | Age: 34
End: 2019-02-22
Payer: COMMERCIAL

## 2019-03-04 RX ORDER — PROMETHAZINE HYDROCHLORIDE 25 MG/1
25 TABLET ORAL EVERY 6 HOURS PRN
Qty: 30 TAB | Refills: 0 | Status: SHIPPED | OUTPATIENT
Start: 2019-03-04 | End: 2019-03-05 | Stop reason: SDUPTHER

## 2019-03-05 ENCOUNTER — PATIENT MESSAGE (OUTPATIENT)
Dept: MEDICAL GROUP | Facility: MEDICAL CENTER | Age: 34
End: 2019-03-05

## 2019-03-05 DIAGNOSIS — R11.2 NON-INTRACTABLE VOMITING WITH NAUSEA, UNSPECIFIED VOMITING TYPE: ICD-10-CM

## 2019-03-05 RX ORDER — OMEPRAZOLE 20 MG/1
20 CAPSULE, DELAYED RELEASE ORAL DAILY
Qty: 30 CAP | Refills: 2 | Status: CANCELLED
Start: 2019-03-05

## 2019-03-05 RX ORDER — PROMETHAZINE HYDROCHLORIDE 25 MG/1
25 TABLET ORAL EVERY 6 HOURS PRN
Qty: 30 TAB | Refills: 0 | Status: SHIPPED | OUTPATIENT
Start: 2019-03-05 | End: 2019-09-25

## 2019-03-05 RX ORDER — ONDANSETRON 4 MG/1
4 TABLET, FILM COATED ORAL EVERY 4 HOURS PRN
Qty: 20 TAB | Refills: 0 | Status: SHIPPED | OUTPATIENT
Start: 2019-03-05 | End: 2019-09-25

## 2019-03-06 ENCOUNTER — PATIENT MESSAGE (OUTPATIENT)
Dept: MEDICAL GROUP | Facility: MEDICAL CENTER | Age: 34
End: 2019-03-06

## 2019-03-06 DIAGNOSIS — R10.9 FLANK PAIN: ICD-10-CM

## 2019-03-06 DIAGNOSIS — Z13.29 THYROID DISORDER SCREEN: ICD-10-CM

## 2019-03-06 DIAGNOSIS — Z13.220 LIPID SCREENING: ICD-10-CM

## 2019-03-06 NOTE — TELEPHONE ENCOUNTER
From: Lizz Storey  To: KIRAN Yuen  Sent: 3/5/2019 3:41 PM PST  Subject: Prescription Question    ondastron i think the generic is.. which ever the disintegrating pill 4mgs  ----- Message -----  From: KIRAN Yuen  Sent: 3/5/2019 2:58 PM PST  To: Lizz Storey  Subject: RE: Prescription Question  Do you need omeprazole? Other medication has been sent.  Sharon LUIS      ----- Message -----   From: Lizz Storey   Sent: 3/5/2019 1:11 PM PST   To: KIRAN Yuen  Subject: Prescription Question    I ASKED FOR PHENERGAN REFILL AS WELL AS A REFILL ON THE DISENIGRATING TABLETS

## 2019-03-06 NOTE — TELEPHONE ENCOUNTER
From: Lizz Storey  To: KIRAN Yuen  Sent: 3/6/2019 12:59 PM PST  Subject: Prescription Question    yes I AM ACTUALLY GETTING EVERYTHING DONE PRIOR TO ANNUAL WITH YOU.. I WAITED TOO LONG LAST TIME WITH A DEATH IN THE FAMILY (AGAIN) and was khang waiting to see if i had another flare up and yes also following up with urology NOW! candelario sorry for delay but will have everything done before i see you next.. ps do i need to do any blood work?.   ----- Message -----  From: KIRAN Yuen  Sent: 3/5/2019 4:17 PM PST  To: Lizz Storey  Subject: RE: Prescription Question  It has been sent to your pharmacy. Have you scheduled with urology? I am concerned that you have still been unable to get your diagnostic testing done. I cannot continue to treat you for pain when we do not know the underlying cause. I would like to get this properly diagnosed so that we can work toward a resolution. The phone number for urology is below    [facility]  [address] [facility] [place] [place] [address]  Phone: [phone]      ----- Message -----   From: Lizz Storey   Sent: 3/5/2019 3:41 PM PST   To: KIRAN Yuen  Subject: Prescription Question    ondastron i think the generic is.. which ever the disintegrating pill 4mgs  ----- Message -----  From: KIRAN Yuen  Sent: 3/5/2019 2:58 PM PST  To: Lizz Storey  Subject: RE: Prescription Question  Do you need omeprazole? Other medication has been sent.  Sharon LUIS      ----- Message -----   From: Lizz Storey   Sent: 3/5/2019 1:11 PM PST   To: KIRAN Yuen  Subject: Prescription Question    I ASKED FOR PHENERGAN REFILL AS WELL AS A REFILL ON THE DISENIGRATING TABLETS

## 2019-03-22 ENCOUNTER — APPOINTMENT (OUTPATIENT)
Dept: MEDICAL GROUP | Facility: MEDICAL CENTER | Age: 34
End: 2019-03-22
Payer: COMMERCIAL

## 2019-03-29 DIAGNOSIS — R11.2 NON-INTRACTABLE VOMITING WITH NAUSEA, UNSPECIFIED VOMITING TYPE: ICD-10-CM

## 2019-03-29 RX ORDER — ONDANSETRON 4 MG/1
TABLET, ORALLY DISINTEGRATING ORAL
Qty: 30 TAB | Refills: 0 | Status: SHIPPED | OUTPATIENT
Start: 2019-03-29 | End: 2019-05-02 | Stop reason: SDUPTHER

## 2019-04-03 ENCOUNTER — APPOINTMENT (OUTPATIENT)
Dept: MEDICAL GROUP | Facility: MEDICAL CENTER | Age: 34
End: 2019-04-03
Payer: COMMERCIAL

## 2019-04-24 ENCOUNTER — PATIENT MESSAGE (OUTPATIENT)
Dept: MEDICAL GROUP | Facility: MEDICAL CENTER | Age: 34
End: 2019-04-24

## 2019-04-24 NOTE — TELEPHONE ENCOUNTER
From: Lizz Storey  To: KIRAN Yuen  Sent: 4/24/2019 2:23 PM PDT  Subject: Non-Urgent Medical Question    Hi dr weaver... i have a weird question... july talked to numerous hairstylists and july gotten many different answers and july also googled it.. Why does my hair smell like a wet dog when its wet? its recent within the last couple months but it will not go away! i was trying to wait for my annual but i cant stand it any more!!     thank you for your time and i look forward to your response!!

## 2019-04-25 ENCOUNTER — PATIENT MESSAGE (OUTPATIENT)
Dept: MEDICAL GROUP | Facility: MEDICAL CENTER | Age: 34
End: 2019-04-25

## 2019-04-28 NOTE — TELEPHONE ENCOUNTER
From: Lizz Storey  To: KIRAN Yuen  Sent: 4/25/2019 8:13 AM PDT  Subject: Non-Urgent Medical Question    G mornign! no itching just everytime its wet it smells! last night i used a pre wash from head and shoulders then the neutrogena Tgel and it helped but harsh... its weird to me and disgusting! lol   ----- Message -----  From: KIRAN Yuen  Sent: 4/24/2019 4:35 PM PDT  To: Lizz Storey  Subject: RE: Non-Urgent Medical Question  Lizz,  I am sorry I do not have an answer your question! Sometimes scalp odor can be related to fungal infection, are you having any itching?  Sharon LUIS      ----- Message -----   From: Lizz Storey   Sent: 4/24/2019 2:23 PM PDT   To: KIRAN Yuen  Subject: Non-Urgent Medical Question    Samuel weaver... i have a weird question... july talked to numerous hairstylists and july gotten many different answers and july also googled it.. Why does my hair smell like a wet dog when its wet? its recent within the last couple months but it will not go away! i was trying to wait for my annual but i cant stand it any more!!     thank you for your time and i look forward to your response!!

## 2019-05-02 ENCOUNTER — PATIENT MESSAGE (OUTPATIENT)
Dept: MEDICAL GROUP | Facility: MEDICAL CENTER | Age: 34
End: 2019-05-02

## 2019-05-02 DIAGNOSIS — R11.2 NON-INTRACTABLE VOMITING WITH NAUSEA, UNSPECIFIED VOMITING TYPE: ICD-10-CM

## 2019-05-02 RX ORDER — PROMETHAZINE HYDROCHLORIDE 25 MG/1
25 TABLET ORAL EVERY 6 HOURS PRN
Qty: 30 TAB | Refills: 0 | Status: SHIPPED | OUTPATIENT
Start: 2019-05-02 | End: 2020-03-23 | Stop reason: SDUPTHER

## 2019-05-02 RX ORDER — ONDANSETRON 4 MG/1
4 TABLET, ORALLY DISINTEGRATING ORAL EVERY 8 HOURS PRN
Qty: 30 TAB | Refills: 0 | Status: SHIPPED | OUTPATIENT
Start: 2019-05-02 | End: 2019-11-19 | Stop reason: SDUPTHER

## 2019-05-02 NOTE — TELEPHONE ENCOUNTER
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 can i get a refill on my zofran disinigrating tablets and my phenergan Please?.. i just ran out of zofran and have no refills on either!   Thank you your time!!!   ----- Message -----  From: KIRAN Yuen  Sent: 4/28/2019 10:01 AM PDT  To: Lizz Storey  Subject: RE: Non-Urgent Medical Question  I can refer you to a dermatologist if you would like- let me know.  Sharon ULIS      ----- Message -----   From: Lizz Storey   Sent: 4/25/2019 8:13 AM PDT   To: KIRAN Yuen  Subject: Non-Urgent Medical Question    G mornign! no itching just everytime its wet it smells! last night i used a pre wash from head and shoulders then the neutrogena Tgel and it helped but harsh... its weird to me and disgusting! lol   ----- Message -----  From: KIRAN Yuen  Sent: 4/24/2019 4:35 PM PDT  To: Lizz Storey  Subject: RE: Non-Urgent Medical Question  Lizz,  I am sorry I do not have an answer your question! Sometimes scalp odor can be related to fungal infection, are you having any itching?  Sharon LUIS      ----- Message -----   From: Lizz Storey   Sent: 4/24/2019 2:23 PM PDT   To: KIRAN Yuen  Subject: Non-Urgent Medical Question    Samuel weaver... i have a weird question... july talked to numerous hairstylists and july gotten many different answers and july also googled it.. Why does my hair smell like a wet dog when its wet? its recent within the last couple months but it will not go away! i was trying to wait for my annual but i cant stand it any more!!     thank you for your time and i look forward to your response!!

## 2019-05-07 ENCOUNTER — APPOINTMENT (OUTPATIENT)
Dept: MEDICAL GROUP | Facility: MEDICAL CENTER | Age: 34
End: 2019-05-07
Payer: COMMERCIAL

## 2019-05-10 ENCOUNTER — APPOINTMENT (OUTPATIENT)
Dept: MEDICAL GROUP | Facility: MEDICAL CENTER | Age: 34
End: 2019-05-10
Payer: COMMERCIAL

## 2019-05-13 ENCOUNTER — PATIENT MESSAGE (OUTPATIENT)
Dept: MEDICAL GROUP | Facility: MEDICAL CENTER | Age: 34
End: 2019-05-13

## 2019-05-14 ENCOUNTER — OFFICE VISIT (OUTPATIENT)
Dept: MEDICAL GROUP | Facility: MEDICAL CENTER | Age: 34
End: 2019-05-14
Payer: COMMERCIAL

## 2019-05-14 VITALS
WEIGHT: 179.9 LBS | HEIGHT: 63 IN | SYSTOLIC BLOOD PRESSURE: 102 MMHG | HEART RATE: 78 BPM | BODY MASS INDEX: 31.88 KG/M2 | RESPIRATION RATE: 16 BRPM | DIASTOLIC BLOOD PRESSURE: 78 MMHG | TEMPERATURE: 98.4 F | OXYGEN SATURATION: 96 %

## 2019-05-14 DIAGNOSIS — B00.9 HSV-2 (HERPES SIMPLEX VIRUS 2) INFECTION: ICD-10-CM

## 2019-05-14 DIAGNOSIS — R30.0 DYSURIA: ICD-10-CM

## 2019-05-14 PROBLEM — R10.9 LEFT FLANK PAIN: Status: RESOLVED | Noted: 2018-10-22 | Resolved: 2019-05-14

## 2019-05-14 LAB
APPEARANCE UR: YELLOW
BILIRUB UR STRIP-MCNC: NORMAL MG/DL
COLOR UR AUTO: NORMAL
GLUCOSE UR STRIP.AUTO-MCNC: NORMAL MG/DL
KETONES UR STRIP.AUTO-MCNC: NORMAL MG/DL
LEUKOCYTE ESTERASE UR QL STRIP.AUTO: NORMAL
NITRITE UR QL STRIP.AUTO: NORMAL
PH UR STRIP.AUTO: 6.5 [PH] (ref 5–8)
PROT UR QL STRIP: NORMAL MG/DL
RBC UR QL AUTO: NORMAL
SP GR UR STRIP.AUTO: 1.02
UROBILINOGEN UR STRIP-MCNC: 0.2 MG/DL

## 2019-05-14 PROCEDURE — 81002 URINALYSIS NONAUTO W/O SCOPE: CPT | Performed by: PHYSICIAN ASSISTANT

## 2019-05-14 PROCEDURE — 99214 OFFICE O/P EST MOD 30 MIN: CPT | Performed by: PHYSICIAN ASSISTANT

## 2019-05-14 RX ORDER — VALACYCLOVIR HYDROCHLORIDE 1 G/1
1000 TABLET, FILM COATED ORAL DAILY
Qty: 5 TAB | Refills: 5 | Status: SHIPPED | OUTPATIENT
Start: 2019-05-14 | End: 2020-01-07 | Stop reason: SDUPTHER

## 2019-05-14 ASSESSMENT — PATIENT HEALTH QUESTIONNAIRE - PHQ9: CLINICAL INTERPRETATION OF PHQ2 SCORE: 0

## 2019-05-14 NOTE — ASSESSMENT & PLAN NOTE
This is a 34-year-old female complains on Saturday developed some shingles outbreak around her vaginal area.  Complains of significant pain.  Has a chronic history of herpes type II infections.  Takes Zovirax 400 mg daily for prevention.  When she has an outbreak the dosing would be increased.  Complains of significant pain.  Took some pictures and sent them to her PCP.  She complains of fussiness but did not actually see any pus discharge.  Only saw the pictures.  She also complains of vaginal wall pain.  Complains of a mass.  Also complains of some burning urination.

## 2019-05-14 NOTE — LETTER
May 14, 2019         Patient: Lizz Storey   YOB: 1985   Date of Visit: 5/14/2019           To Whom it May Concern:    Lizz Storey was seen in my clinic on 5/14/2019. She may return to work on 5/15/19    If you have any questions or concerns, please don't hesitate to call.        Sincerely,           Kiran Vergara P.A.-C.  Electronically Signed

## 2019-05-14 NOTE — PROGRESS NOTES
Subjective:   Lizz Storey is a 34 y.o. female here today for rash of her vaginal area since Saturday and some burning urination.    HSV-2 (herpes simplex virus 2) infection  This is a 34-year-old female complains on Saturday developed some shingles outbreak around her vaginal area.  Complains of significant pain.  Has a chronic history of herpes type II infections.  Takes Zovirax 400 mg daily for prevention.  When she has an outbreak the dosing would be increased.  Complains of significant pain.  Took some pictures and sent them to her PCP.  She complains of fussiness but did not actually see any pus discharge.  Only saw the pictures.  She also complains of vaginal wall pain.  Complains of a mass.  Also complains of some burning urination.       Current medicines (including changes today)  Current Outpatient Prescriptions   Medication Sig Dispense Refill   • valacyclovir (VALTREX) 1 GM Tab Take 1 Tab by mouth every day. 5 Tab 5   • ondansetron (ZOFRAN ODT) 4 MG TABLET DISPERSIBLE Take 1 Tab by mouth every 8 hours as needed for Nausea. 30 Tab 0   • promethazine (PHENERGAN) 25 MG Tab Take 1 Tab by mouth every 6 hours as needed for Nausea/Vomiting. 30 Tab 0   • promethazine (PHENERGAN) 25 MG Tab Take 1 Tab by mouth every 6 hours as needed for Nausea/Vomiting. 30 Tab 0   • ondansetron (ZOFRAN) 4 MG Tab tablet Take 1 Tab by mouth every four hours as needed for Nausea/Vomiting. 20 Tab 0   • budesonide-formoterol (SYMBICORT) 80-4.5 MCG/ACT Aerosol Inhale 2 Puffs by mouth 2 Times a Day. 1 Inhaler 5   • ipratropium-albuterol (DUONEB) 0.5-2.5 (3) MG/3ML nebulizer solution 3 mL by Nebulization route every four hours as needed for Shortness of Breath. 30 Bullet 0   • albuterol 108 (90 BASE) MCG/ACT Aero Soln inhalation aerosol Inhale 2 Puffs by mouth every 6 hours as needed for Shortness of Breath. 1 Inhaler 0   • leuprolide (LUPRON PEDIATRIC) 11.25 MG Kit 11.25 mg by Intramuscular route every 90 days.     • acyclovir  "(ZOVIRAX) 400 MG tablet Take 400 mg by mouth every day.       No current facility-administered medications for this visit.      She  has a past medical history of ASTHMA; Cyst of ovary, left; Endometriosis; Fibroid, uterus; Genital herpes in women; HPV in female; Kidney infection; and Psychiatric disorder. She also has no past medical history of CAD (coronary artery disease); Cancer (HCC); Congestive heart failure (HCC); COPD; Diabetes; Hypertension; Liver disease; Seizure disorder (HCC); or Stroke (HCC).    Social History and Family History were reviewed and updated.    ROS   No chest pain, no shortness of breath, no abdominal pain and all other systems were reviewed and are negative.       Objective:     /78 (BP Location: Left arm, Patient Position: Sitting, BP Cuff Size: Adult)   Pulse 78   Temp 36.9 °C (98.4 °F) (Temporal)   Resp 16   Ht 1.6 m (5' 3\")   Wt 81.6 kg (179 lb 14.3 oz)   SpO2 96%  Body mass index is 31.87 kg/m².   Physical Exam:  Constitutional: Alert, distressed, crying.  Skin: Warm, dry, good turgor, no rashes in visible areas.  Eye: Equal, round and reactive, conjunctiva clear, lids normal.  ENMT: Lips without lesions, good dentition, oropharynx clear.  Neck: Trachea midline, no masses.   Lymph: No cervical or supraclavicular lymphadenopathy  Respiratory: Unlabored respiratory effort, lungs clear to auscultation, no wheezes, no ronchi.  Cardiovascular: Normal S1, S2, no murmur, no edema.  Pelvic examination.  Chaperone with Harsh.  Noted cropping's of 2 areas of scab lesions suggestive of herpes.  Palpation of these areas because patient significant tenderness.  I thoroughly evaluated the surrounding vaginal tissue and saw no other lesions.  Patient also was not sexually active and saw no reason to perform further using a speculum.  No nodules.  No masses.  Patient had generalized tenderness to touching any of the areas of the vaginal wall.  Psych: Alert and oriented x3, normal affect " and mood.    Urinalysis unremarkable except for 3+ microscopic blood.    Assessment and Plan:   The following treatment plan was discussed    1. HSV-2 (herpes simplex virus 2) infection  Chronic condition with recent exacerbation.  Patient's pain tolerance is poor.  Will provide her a stronger dosing of Valtrex 1 g take 1 daily for up to 5 days.  Saw no reason to collect any fluid as there is no discharge.  So no other concerns with her pelvic examination.  - valacyclovir (VALTREX) 1 GM Tab; Take 1 Tab by mouth every day.  Dispense: 5 Tab; Refill: 5    2. Dysuria  Acute, new onset condition likely secondary to herpetic outbreak.  Urinalysis did not show anything concerning for cystitis.  Symptoms likely secondary to pain associated with herpes.  Provided Valtrex.  Given her previous history of opiate use did not offer any narcotics.  - POCT Urinalysis    Patient was seen for 25 minutes face to face of which > 50% of appointment time was spent on counseling and coordination of care regarding the above.    Followup: Return if symptoms worsen or fail to improve.    Please note that this dictation was created using voice recognition software. I have made every reasonable attempt to correct obvious errors, but I expect that there are errors of grammar and possibly content that I did not discover before finalizing the note.

## 2019-05-15 ENCOUNTER — PATIENT MESSAGE (OUTPATIENT)
Dept: MEDICAL GROUP | Facility: MEDICAL CENTER | Age: 34
End: 2019-05-15

## 2019-05-15 ENCOUNTER — OFFICE VISIT (OUTPATIENT)
Dept: MEDICAL GROUP | Facility: MEDICAL CENTER | Age: 34
End: 2019-05-15
Payer: COMMERCIAL

## 2019-05-15 ENCOUNTER — HOSPITAL ENCOUNTER (OUTPATIENT)
Facility: MEDICAL CENTER | Age: 34
End: 2019-05-15
Attending: NURSE PRACTITIONER
Payer: COMMERCIAL

## 2019-05-15 ENCOUNTER — HOSPITAL ENCOUNTER (OUTPATIENT)
Dept: RADIOLOGY | Facility: MEDICAL CENTER | Age: 34
End: 2019-05-15
Attending: NURSE PRACTITIONER
Payer: COMMERCIAL

## 2019-05-15 VITALS
TEMPERATURE: 98.2 F | WEIGHT: 182 LBS | HEIGHT: 63 IN | HEART RATE: 82 BPM | BODY MASS INDEX: 32.25 KG/M2 | SYSTOLIC BLOOD PRESSURE: 114 MMHG | RESPIRATION RATE: 16 BRPM | DIASTOLIC BLOOD PRESSURE: 70 MMHG | OXYGEN SATURATION: 97 %

## 2019-05-15 DIAGNOSIS — R10.9 LEFT FLANK PAIN: ICD-10-CM

## 2019-05-15 DIAGNOSIS — B00.9 HSV-2 (HERPES SIMPLEX VIRUS 2) INFECTION: ICD-10-CM

## 2019-05-15 PROCEDURE — 87086 URINE CULTURE/COLONY COUNT: CPT

## 2019-05-15 PROCEDURE — 76775 US EXAM ABDO BACK WALL LIM: CPT

## 2019-05-15 PROCEDURE — 99214 OFFICE O/P EST MOD 30 MIN: CPT | Performed by: NURSE PRACTITIONER

## 2019-05-15 RX ORDER — OXYCODONE HYDROCHLORIDE AND ACETAMINOPHEN 5; 325 MG/1; MG/1
1 TABLET ORAL EVERY 8 HOURS PRN
Qty: 15 TAB | Refills: 0 | Status: SHIPPED | OUTPATIENT
Start: 2019-05-15 | End: 2019-05-20

## 2019-05-15 NOTE — TELEPHONE ENCOUNTER
From: Lizz Storey  To: KIRAN Yuen  Sent: 5/15/2019 8:49 AM PDT  Subject: Procedure Question    good morning.. i saw Artis yesterday in your office and kept my annual with you today.. im glad i did!! i thought i had shingles turns out to be a bad herpes outbreak which is why i thought it hurt to pee.... NOPE i have blood again in my urine and urology [place] cant get me in till Monday at 315.. can you check if there was any blood in yesterdays sample he ordered?.. herpes hurt but now im having the sammmme pain july been experiencing with you with the back and extremely when i pee so my pain scale is on a good 7-8 today ... ill see you today at 1140am and will def need a note for work today as well...   ----- Message -----  From: KIRAN Yuen  Sent: 5/13/2019 11:58 AM PDT  To: Lizz Storey  Subject: RE: Procedure Question  Samuel Zamudio,  I do not work on Mondays but I suggest you call and see if someone can see you today or tomorrow.  Sharon LUIS      ----- Message -----   From: Lizz Storey   Sent: 5/13/2019 9:53 AM PDT   To: KIRAN Yuen  Subject: Procedure Question    I have my annual with you this weds but im not sure if i can wait till then .. over the weekend there has been some developments... i have 2 areas on my vagina that look like singles! they HURT and i found a round bump more inside... my gyno sucks and i am currently looking for another one but i havent gotten a call back.. what do i do..

## 2019-05-15 NOTE — PROGRESS NOTES
Subjective:     Chief Complaint   Patient presents with   • Annual Exam     URINARY PAIN, HAS UROLOGY APPOINTMENT ON 05/20/2019 UROLOGY [facility]      Lizz Storey is a 34 y.o. female initially scheduled for annual exam today but she presents in acute pain.  Annual will be postponed.  We discussed:    Left flank pain  This is been a recurrent issue for which patient has been seen multiple times.  Review of chart shows visits for this in June 2018, October 2018, January 2019.  Current episode starting 3 days ago.  She is also having an HSV outbreak which may be contributing to her discomfort.  She reports left flank pain with radiation down to the lower abdomen exacerbated by urination, 7 out of 10 at times.  Having a difficult time sitting still or getting comfortable, she has some associated nausea.  She tells me today that she has an appointment next Monday with urology.  Unfortunately although she has been seen multiple times we have been unable to obtain any imaging to rule out calculi, she has not shown up for multiple imaging appointments.  She is out of work today and agreeable to going today for ultrasound.  She also has history of chronic pelvic pain related to endometriosis, is an oral medication for this.  Of note, patient does have history of prescription drug abuse.  She is requesting pain medication today.  She had been treated for quite a while with methadone, is off of medication at this time.  Her last opiate prescription was filled in January with a quantity of 20.  States that she has been using ibuprofen 800 mg 3 times daily which is not helping to control her discomfort  No fever, chills, vomiting, decreased urine output, vaginal bleeding       Current medicines (including changes today)  Current Outpatient Prescriptions   Medication Sig Dispense Refill   • oxyCODONE-acetaminophen (PERCOCET) 5-325 MG Tab Take 1 Tab by mouth every 8 hours as needed for up to 5 days. 15 Tab 0   •  "valacyclovir (VALTREX) 1 GM Tab Take 1 Tab by mouth every day. 5 Tab 5   • ondansetron (ZOFRAN ODT) 4 MG TABLET DISPERSIBLE Take 1 Tab by mouth every 8 hours as needed for Nausea. 30 Tab 0   • promethazine (PHENERGAN) 25 MG Tab Take 1 Tab by mouth every 6 hours as needed for Nausea/Vomiting. 30 Tab 0   • promethazine (PHENERGAN) 25 MG Tab Take 1 Tab by mouth every 6 hours as needed for Nausea/Vomiting. 30 Tab 0   • ondansetron (ZOFRAN) 4 MG Tab tablet Take 1 Tab by mouth every four hours as needed for Nausea/Vomiting. 20 Tab 0   • budesonide-formoterol (SYMBICORT) 80-4.5 MCG/ACT Aerosol Inhale 2 Puffs by mouth 2 Times a Day. 1 Inhaler 5   • ipratropium-albuterol (DUONEB) 0.5-2.5 (3) MG/3ML nebulizer solution 3 mL by Nebulization route every four hours as needed for Shortness of Breath. 30 Bullet 0   • albuterol 108 (90 BASE) MCG/ACT Aero Soln inhalation aerosol Inhale 2 Puffs by mouth every 6 hours as needed for Shortness of Breath. 1 Inhaler 0   • acyclovir (ZOVIRAX) 400 MG tablet Take 400 mg by mouth every day.       No current facility-administered medications for this visit.      She  has a past medical history of ASTHMA; Cyst of ovary, left; Endometriosis; Fibroid, uterus; Genital herpes in women; HPV in female; Kidney infection; and Psychiatric disorder. She also has no past medical history of CAD (coronary artery disease); Cancer (HCC); Congestive heart failure (HCC); COPD; Diabetes; Hypertension; Liver disease; Seizure disorder (HCC); or Stroke (HCC).    ROS included above     Objective:     /70 (BP Location: Left arm, Patient Position: Sitting, BP Cuff Size: Adult)   Pulse 82   Temp 36.8 °C (98.2 °F) (Temporal)   Resp 16   Ht 1.6 m (5' 3\")   Wt 82.6 kg (182 lb)   SpO2 97%  Body mass index is 32.24 kg/m².     Physical Exam:  General: Alert, oriented in no acute distress.  Eye contact is good, speech is normal, affect calm  Lungs: clear to auscultation bilaterally, normal effort, no wheeze/ " rhonchi/ rales.  CV: regular rate and rhythm, S1, S2, no murmur  Abdomen: soft, nontender, nondistended, left CVAT   Ext: no edema, color normal, vascularity normal, temperature normal    Assessment and Plan:   The following treatment plan was discussed   1. Left flank pain   intermittent episodes of left flank pain concerning for possible calculi.  She is afebrile.  Unfortunately, patient is failed to show up for imaging appointments multiple times.  We have scheduled her today for a 1:00 appointment.  She is requesting pain medication.  I have discussed this with her at length explaining my concern regarding treating her pain again without an identified diagnosis, this is the last time I will prescribe her pain medication if imaging is not completed.  She does have an upcoming appointment with urology.  We will send urine for culture today.  UDS from yesterday showing blood but otherwise normal, hold off on antibiotic for now.   report reviewed, no concerning findings.  Prescription for 15 tablets of oxycodone provided.  Risk benefits and side effects discussed.  Consent for opiate prescription reviewed and signed, UDS obtained  URINE CULTURE(NEW)    US-RENAL    oxyCODONE-acetaminophen (PERCOCET) 5-325 MG Tab    Pain Management Screen    Consent for Opiate Prescription       2. HSV-2 (herpes simplex virus 2) infection   currently having an outbreak, on valacyclovir, this may be contributing to her dysuria.       Followup: Pending imaging         Please note that this dictation was created using voice recognition software. I have worked with consultants from the vendor as well as technical experts from [facility] [employer] to optimize the interface. I have made every reasonable attempt to correct obvious errors, but I expect that there are errors of grammar and possibly content that I did not discover before finalizing the note.

## 2019-05-15 NOTE — ASSESSMENT & PLAN NOTE
This is been a recurrent issue for which patient has been seen multiple times.  Review of chart shows visits for this in June 2018, October 2018, January 2019.  Current episode starting 3 days ago.  She is also having an HSV outbreak which may be contributing to her discomfort.  She reports left flank pain with radiation down to the lower abdomen exacerbated by urination, 7 out of 10 at times.  Having a difficult time sitting still or getting comfortable, she has some associated nausea.  She tells me today that she has an appointment next Monday with urology.  Unfortunately although she has been seen multiple times we have been unable to obtain any imaging to rule out calculi, she has not shown up for multiple imaging appointments.  She is out of work today and agreeable to going today for ultrasound.  She also has history of chronic pelvic pain related to endometriosis, is an oral medication for this.  Of note, patient does have history of prescription drug abuse.  She is requesting pain medication today.  She had been treated for quite a while with methadone, is off of medication at this time.  Her last opiate prescription was filled in January with a quantity of 20.  States that she has been using ibuprofen 800 mg 3 times daily which is not helping to control her discomfort  No fever, chills, vomiting, decreased urine output, vaginal bleeding

## 2019-05-15 NOTE — LETTER
May 15, 2019        RE: Lizz Storey    To Whom It May Concern;    Lizz Storey was seen today in my office for a medical appointment. Please excuse her work absence 5/15/19 and 5/16/19 due to illness.    Please contact me with any questions.    Sincerely,                KRYSTYNA Yuen.

## 2019-05-16 ENCOUNTER — TELEPHONE (OUTPATIENT)
Dept: MEDICAL GROUP | Facility: MEDICAL CENTER | Age: 34
End: 2019-05-16

## 2019-05-16 DIAGNOSIS — R10.9 LEFT FLANK PAIN: ICD-10-CM

## 2019-05-16 NOTE — TELEPHONE ENCOUNTER
----- Message from KIRAN Yuen sent at 5/15/2019  2:41 PM PDT -----  Please fax copy of ultrasound report to urology if [place], patient has appointment Monday

## 2019-05-18 LAB
BACTERIA UR CULT: NORMAL
SIGNIFICANT IND 70042: NORMAL
SITE SITE: NORMAL
SOURCE SOURCE: NORMAL

## 2019-05-19 ENCOUNTER — PATIENT MESSAGE (OUTPATIENT)
Dept: MEDICAL GROUP | Facility: MEDICAL CENTER | Age: 34
End: 2019-05-19

## 2019-05-20 RX ORDER — CLINDAMYCIN HYDROCHLORIDE 300 MG/1
300 CAPSULE ORAL 3 TIMES DAILY
Qty: 21 CAP | Refills: 0 | Status: SHIPPED | OUTPATIENT
Start: 2019-05-20 | End: 2019-09-25

## 2019-07-06 ENCOUNTER — HOSPITAL ENCOUNTER (EMERGENCY)
Facility: MEDICAL CENTER | Age: 34
End: 2019-07-06
Attending: EMERGENCY MEDICINE
Payer: COMMERCIAL

## 2019-07-06 VITALS
TEMPERATURE: 97.8 F | OXYGEN SATURATION: 96 % | SYSTOLIC BLOOD PRESSURE: 132 MMHG | HEART RATE: 67 BPM | DIASTOLIC BLOOD PRESSURE: 84 MMHG | HEIGHT: 63 IN | WEIGHT: 172.84 LBS | RESPIRATION RATE: 14 BRPM | BODY MASS INDEX: 30.62 KG/M2

## 2019-07-06 DIAGNOSIS — R10.32 LLQ ABDOMINAL PAIN: ICD-10-CM

## 2019-07-06 DIAGNOSIS — N80.9 ENDOMETRIOSIS: ICD-10-CM

## 2019-07-06 PROCEDURE — 96375 TX/PRO/DX INJ NEW DRUG ADDON: CPT

## 2019-07-06 PROCEDURE — 700111 HCHG RX REV CODE 636 W/ 250 OVERRIDE (IP): Performed by: EMERGENCY MEDICINE

## 2019-07-06 PROCEDURE — 99285 EMERGENCY DEPT VISIT HI MDM: CPT

## 2019-07-06 PROCEDURE — 96374 THER/PROPH/DIAG INJ IV PUSH: CPT

## 2019-07-06 RX ORDER — ONDANSETRON 2 MG/ML
4 INJECTION INTRAMUSCULAR; INTRAVENOUS
Status: DISCONTINUED | OUTPATIENT
Start: 2019-07-06 | End: 2019-07-06 | Stop reason: HOSPADM

## 2019-07-06 RX ORDER — HYDROMORPHONE HYDROCHLORIDE 1 MG/ML
1 INJECTION, SOLUTION INTRAMUSCULAR; INTRAVENOUS; SUBCUTANEOUS ONCE
Status: COMPLETED | OUTPATIENT
Start: 2019-07-06 | End: 2019-07-06

## 2019-07-06 RX ORDER — OXYCODONE HYDROCHLORIDE AND ACETAMINOPHEN 5; 325 MG/1; MG/1
1-2 TABLET ORAL EVERY 6 HOURS PRN
Qty: 15 TAB | Refills: 0 | Status: SHIPPED | OUTPATIENT
Start: 2019-07-06 | End: 2019-07-09

## 2019-07-06 RX ADMIN — HYDROMORPHONE HYDROCHLORIDE 1 MG: 1 INJECTION, SOLUTION INTRAMUSCULAR; INTRAVENOUS; SUBCUTANEOUS at 10:16

## 2019-07-06 RX ADMIN — ONDANSETRON 4 MG: 2 INJECTION INTRAMUSCULAR; INTRAVENOUS at 10:16

## 2019-07-06 NOTE — ED NOTES
Pt states she is feeling much better.   Discharge instructions given with prescriptions.  Pt ambulated out of er in nad

## 2019-07-06 NOTE — ED PROVIDER NOTES
ED Provider Note    CHIEF COMPLAINT  Chief Complaint   Patient presents with   • Pelvic Pain   • LLQ Pain       HPI  Lizz Storey is a 34 y.o. female who presents with severe left lower quadrant abdominal pain to 8 of 10 present for the last 3 days.  History of endometriosis and this is exactly typical.  She is been treated with Lupron and now Ormisa.  Her pain is been much less frequent since she has been on these pain medications managed by Dr. Bravo.  Eyes fever although has some nausea.  She had diarrhea 3 times today.  No vomiting or constipation.  She is status post appendectomy and cholecystectomy.  She is discussing hysterectomy with her gynecologist.  Patient used to be a very frequent ER visitor and has not been to the ER for 2 years since being managed by Dr. Bravo.    REVIEW OF SYSTEMS  Pertinent positives include: Abdominal pain, nausea.  Pertinent negatives include: Dysuria, urgency, frequency, food poisoning, travel, camping, pregnancy.  10+ systems reviewed and negative.      PAST MEDICAL HISTORY  Past Medical History:   Diagnosis Date   • ASTHMA    • Cyst of ovary, left    • Endometriosis     with multiple lap procedures   • Fibroid, uterus    • Genital herpes in women    • HPV in female    • Kidney infection    • Psychiatric disorder     depression       SOCIAL HISTORY  Social History   Substance Use Topics   • Smoking status: Former Smoker     Packs/day: 0.25     Years: 15.00   • Smokeless tobacco: Never Used      Comment: 1.5 years   • Alcohol use No      Comment: 2 times per year     History   Drug Use   • Types: Inhaled, Marijuana     Comment: marijuana 2 x per month       SURGICAL HISTORY  Past Surgical History:   Procedure Laterality Date   • APPENDECTOMY     • CHRISTEL BY LAPAROSCOPY     • LAPAROSCOPY      X4   • OTHER ABDOMINAL SURGERY         CURRENT MEDICATIONS  No current facility-administered medications for this encounter.      Current Outpatient Prescriptions   Medication Sig  "Dispense Refill   • oxyCODONE-acetaminophen (PERCOCET) 5-325 MG Tab Take 1-2 Tabs by mouth every 6 hours as needed (moderate to severe pain) for up to 3 days. 15 Tab 0   • clindamycin (CLEOCIN) 300 MG Cap Take 1 Cap by mouth 3 times a day. 21 Cap 0   • valacyclovir (VALTREX) 1 GM Tab Take 1 Tab by mouth every day. 5 Tab 5   • ondansetron (ZOFRAN ODT) 4 MG TABLET DISPERSIBLE Take 1 Tab by mouth every 8 hours as needed for Nausea. 30 Tab 0   • promethazine (PHENERGAN) 25 MG Tab Take 1 Tab by mouth every 6 hours as needed for Nausea/Vomiting. 30 Tab 0   • promethazine (PHENERGAN) 25 MG Tab Take 1 Tab by mouth every 6 hours as needed for Nausea/Vomiting. 30 Tab 0   • ondansetron (ZOFRAN) 4 MG Tab tablet Take 1 Tab by mouth every four hours as needed for Nausea/Vomiting. 20 Tab 0   • budesonide-formoterol (SYMBICORT) 80-4.5 MCG/ACT Aerosol Inhale 2 Puffs by mouth 2 Times a Day. 1 Inhaler 5   • ipratropium-albuterol (DUONEB) 0.5-2.5 (3) MG/3ML nebulizer solution 3 mL by Nebulization route every four hours as needed for Shortness of Breath. 30 Bullet 0   • albuterol 108 (90 BASE) MCG/ACT Aero Soln inhalation aerosol Inhale 2 Puffs by mouth every 6 hours as needed for Shortness of Breath. 1 Inhaler 0   • acyclovir (ZOVIRAX) 400 MG tablet Take 400 mg by mouth every day.         ALLERGIES  Allergies   Allergen Reactions   • Carrot [Daucus Carota] Anaphylaxis     Only raw carrot; cooked carrot ok.   • Ciprofloxacin Hives   • Keflex      Mild hand swelling   • Ketorolac Tromethamine Rash   • Morphine Hives   • Penicillins Rash       PHYSICAL EXAM  VITAL SIGNS: /84   Pulse 67   Temp 36.6 °C (97.8 °F) (Temporal)   Resp 14   Ht 1.6 m (5' 3\")   Wt 78.4 kg (172 lb 13.5 oz)   SpO2 96%   BMI 30.62 kg/m²   Reviewed and high normal blood pressure, afebrile, appears to be in pain  Constitutional: Well developed, Well nourished,.  HENT: Normocephalic, atraumatic, bilateral external ears normal, oropharynx moist, No exudates " or erythema.   Eyes: PERRLA, conjunctiva pink, no scleral icterus.   Cardiovascular: Regular S1-S2 without murmur, rub, gallop.  Respiratory: No rales, rhonchi, wheeze.  Gastrointestinal: Soft, marked tenderness in the left lower quadrant with an borderline rebound, no distention, bowel sounds normal, no masses.  Skin: No erythema, no rash.   Genitourinary:  No costovertebral angle tenderness.   Neurologic: Alert & oriented x 3, cranial nerves 2-12 intact by passive exam.  No focal deficit noted.  Psychiatric: Affect normal, Judgment normal, Mood normal.     DIFFERENTIAL DIAGNOSIS:  Cyst, ovarian cyst, torsion, UTI, pregnancy, doubt diverticulitis.    RADIOLOGY/PROCEDURES  Per chart review patient is had 32 abdominal CT scans in our facility since 2004.  She does not have diverticular or kidney stones.    LABORATORY:  Pregnancy test accidentally omitted but pregnancy unlikely given hormone use    INTERVENTIONS:  Medications   HYDROmorphone pf (DILAUDID) injection 1 mg (1 mg Intravenous Given 7/6/19 1016)     Response: Improvement in pain.    COURSE & MEDICAL DECISION MAKING  This patient presents with severe left lower quadrant abdominal pain likely due to her endometriosis.  She is never had diverticular kidney stones on CT.  She is status post appendectomy.  At this point there is no benefit to repeat imaging.  I doubt laboratory studies will  since patient states this presentation is exactly typical of her endometriosis with no atypical features..    This patient has borderline or elevated blood pressure as recorded above and was instructed to followup with primary physician for comprehensive blood pressure evaluation and yearly fasting cholesterol assessment according to to CMS protocol.    PLAN:  Discharge Medication List as of 7/6/2019 10:50 AM      START taking these medications    Details   oxyCODONE-acetaminophen (PERCOCET) 5-325 MG Tab Take 1-2 Tabs by mouth every 6 hours as needed  (moderate to severe pain) for up to 3 days., Disp-15 Tab, R-0, Print Rx Paper, For 3 days             Prescription monitoring queried and score in 200s  Opiate risk tool utilized and patient moderate risk  Informed consent obtained for opiate analgesic  Indication opiate analgesic subjective endometriosis pain    Endometriosis handout given  Return for fever, uncontrolled vomiting, dizziness, GI bleed, ill appearance    Dr. Bravo    CONDITION: Stable, improved.    FINAL IMPRESSION  1. LLQ abdominal pain    2. Endometriosis          Electronically signed by: Vincent Velazquez, 7/6/2019 1:48 PM

## 2019-07-06 NOTE — ED TRIAGE NOTES
Chief Complaint   Patient presents with   • Pelvic Pain   • LLQ Pain   Pt to triage in obvious discomfort.  Pt reports low pelvic pain and left lower quadrant pain.  Pt states hx of endometriosis and believes she is having a flare up.  Pt educated on triage process and instructed to notify triage RN of any change in status.

## 2019-08-02 ENCOUNTER — APPOINTMENT (OUTPATIENT)
Dept: MEDICAL GROUP | Facility: MEDICAL CENTER | Age: 34
End: 2019-08-02
Payer: COMMERCIAL

## 2019-09-14 ENCOUNTER — APPOINTMENT (OUTPATIENT)
Dept: RADIOLOGY | Facility: MEDICAL CENTER | Age: 34
End: 2019-09-14
Attending: EMERGENCY MEDICINE
Payer: COMMERCIAL

## 2019-09-14 ENCOUNTER — HOSPITAL ENCOUNTER (EMERGENCY)
Facility: MEDICAL CENTER | Age: 34
End: 2019-09-14
Attending: EMERGENCY MEDICINE
Payer: COMMERCIAL

## 2019-09-14 VITALS
OXYGEN SATURATION: 90 % | WEIGHT: 178.13 LBS | RESPIRATION RATE: 16 BRPM | DIASTOLIC BLOOD PRESSURE: 65 MMHG | HEART RATE: 60 BPM | SYSTOLIC BLOOD PRESSURE: 102 MMHG | HEIGHT: 63 IN | BODY MASS INDEX: 31.56 KG/M2 | TEMPERATURE: 97.2 F

## 2019-09-14 DIAGNOSIS — Z76.0 MEDICATION REFILL: ICD-10-CM

## 2019-09-14 DIAGNOSIS — N80.9 ENDOMETRIOSIS: ICD-10-CM

## 2019-09-14 DIAGNOSIS — D25.9 UTERINE LEIOMYOMA, UNSPECIFIED LOCATION: ICD-10-CM

## 2019-09-14 DIAGNOSIS — R10.32 LEFT LOWER QUADRANT PAIN: ICD-10-CM

## 2019-09-14 LAB
ALBUMIN SERPL BCP-MCNC: 4.1 G/DL (ref 3.2–4.9)
ALBUMIN/GLOB SERPL: 1.6 G/DL
ALP SERPL-CCNC: 108 U/L (ref 30–99)
ALT SERPL-CCNC: 36 U/L (ref 2–50)
ANION GAP SERPL CALC-SCNC: 8 MMOL/L (ref 0–11.9)
APPEARANCE UR: CLEAR
AST SERPL-CCNC: 20 U/L (ref 12–45)
BASOPHILS # BLD AUTO: 0.6 % (ref 0–1.8)
BASOPHILS # BLD: 0.05 K/UL (ref 0–0.12)
BILIRUB SERPL-MCNC: 0.4 MG/DL (ref 0.1–1.5)
BILIRUB UR QL STRIP.AUTO: NEGATIVE
BUN SERPL-MCNC: 8 MG/DL (ref 8–22)
CALCIUM SERPL-MCNC: 8.8 MG/DL (ref 8.5–10.5)
CHLORIDE SERPL-SCNC: 107 MMOL/L (ref 96–112)
CO2 SERPL-SCNC: 23 MMOL/L (ref 20–33)
COLOR UR: YELLOW
CREAT SERPL-MCNC: 0.63 MG/DL (ref 0.5–1.4)
EOSINOPHIL # BLD AUTO: 0.66 K/UL (ref 0–0.51)
EOSINOPHIL NFR BLD: 8 % (ref 0–6.9)
ERYTHROCYTE [DISTWIDTH] IN BLOOD BY AUTOMATED COUNT: 43.8 FL (ref 35.9–50)
GLOBULIN SER CALC-MCNC: 2.6 G/DL (ref 1.9–3.5)
GLUCOSE SERPL-MCNC: 107 MG/DL (ref 65–99)
GLUCOSE UR STRIP.AUTO-MCNC: NEGATIVE MG/DL
HCG UR QL: NEGATIVE
HCT VFR BLD AUTO: 46.3 % (ref 37–47)
HGB BLD-MCNC: 15.1 G/DL (ref 12–16)
IMM GRANULOCYTES # BLD AUTO: 0.01 K/UL (ref 0–0.11)
IMM GRANULOCYTES NFR BLD AUTO: 0.1 % (ref 0–0.9)
KETONES UR STRIP.AUTO-MCNC: NEGATIVE MG/DL
LEUKOCYTE ESTERASE UR QL STRIP.AUTO: NEGATIVE
LYMPHOCYTES # BLD AUTO: 3.11 K/UL (ref 1–4.8)
LYMPHOCYTES NFR BLD: 37.7 % (ref 22–41)
MCH RBC QN AUTO: 29.9 PG (ref 27–33)
MCHC RBC AUTO-ENTMCNC: 32.6 G/DL (ref 33.6–35)
MCV RBC AUTO: 91.7 FL (ref 81.4–97.8)
MICRO URNS: NORMAL
MONOCYTES # BLD AUTO: 0.41 K/UL (ref 0–0.85)
MONOCYTES NFR BLD AUTO: 5 % (ref 0–13.4)
NEUTROPHILS # BLD AUTO: 4.02 K/UL (ref 2–7.15)
NEUTROPHILS NFR BLD: 48.6 % (ref 44–72)
NITRITE UR QL STRIP.AUTO: NEGATIVE
NRBC # BLD AUTO: 0 K/UL
NRBC BLD-RTO: 0 /100 WBC
PH UR STRIP.AUTO: 6.5 [PH] (ref 5–8)
PLATELET # BLD AUTO: 248 K/UL (ref 164–446)
PMV BLD AUTO: 10.6 FL (ref 9–12.9)
POTASSIUM SERPL-SCNC: 3.8 MMOL/L (ref 3.6–5.5)
PROT SERPL-MCNC: 6.7 G/DL (ref 6–8.2)
PROT UR QL STRIP: NEGATIVE MG/DL
RBC # BLD AUTO: 5.05 M/UL (ref 4.2–5.4)
RBC UR QL AUTO: NEGATIVE
SODIUM SERPL-SCNC: 138 MMOL/L (ref 135–145)
SP GR UR STRIP.AUTO: 1.02
UROBILINOGEN UR STRIP.AUTO-MCNC: 0.2 MG/DL
WBC # BLD AUTO: 8.3 K/UL (ref 4.8–10.8)

## 2019-09-14 PROCEDURE — 36415 COLL VENOUS BLD VENIPUNCTURE: CPT

## 2019-09-14 PROCEDURE — 85025 COMPLETE CBC W/AUTO DIFF WBC: CPT

## 2019-09-14 PROCEDURE — 700111 HCHG RX REV CODE 636 W/ 250 OVERRIDE (IP)

## 2019-09-14 PROCEDURE — 76856 US EXAM PELVIC COMPLETE: CPT

## 2019-09-14 PROCEDURE — 96376 TX/PRO/DX INJ SAME DRUG ADON: CPT

## 2019-09-14 PROCEDURE — 80053 COMPREHEN METABOLIC PANEL: CPT

## 2019-09-14 PROCEDURE — 81025 URINE PREGNANCY TEST: CPT

## 2019-09-14 PROCEDURE — 96374 THER/PROPH/DIAG INJ IV PUSH: CPT

## 2019-09-14 PROCEDURE — 99285 EMERGENCY DEPT VISIT HI MDM: CPT

## 2019-09-14 PROCEDURE — 81003 URINALYSIS AUTO W/O SCOPE: CPT

## 2019-09-14 PROCEDURE — 700111 HCHG RX REV CODE 636 W/ 250 OVERRIDE (IP): Performed by: EMERGENCY MEDICINE

## 2019-09-14 RX ORDER — OXYCODONE HYDROCHLORIDE AND ACETAMINOPHEN 5; 325 MG/1; MG/1
1-2 TABLET ORAL EVERY 4 HOURS PRN
Qty: 12 TAB | Refills: 0 | Status: SHIPPED | OUTPATIENT
Start: 2019-09-14 | End: 2019-09-17

## 2019-09-14 RX ORDER — HYDROMORPHONE HYDROCHLORIDE 1 MG/ML
1 INJECTION, SOLUTION INTRAMUSCULAR; INTRAVENOUS; SUBCUTANEOUS ONCE
Status: COMPLETED | OUTPATIENT
Start: 2019-09-14 | End: 2019-09-14

## 2019-09-14 RX ORDER — ALBUTEROL SULFATE 90 UG/1
2 AEROSOL, METERED RESPIRATORY (INHALATION) EVERY 4 HOURS PRN
Qty: 1 INHALER | Refills: 0 | Status: SHIPPED | OUTPATIENT
Start: 2019-09-14 | End: 2020-05-27 | Stop reason: SDUPTHER

## 2019-09-14 RX ADMIN — HYDROMORPHONE HYDROCHLORIDE 1 MG: 1 INJECTION, SOLUTION INTRAMUSCULAR; INTRAVENOUS; SUBCUTANEOUS at 06:24

## 2019-09-14 RX ADMIN — HYDROMORPHONE HYDROCHLORIDE 1 MG: 1 INJECTION, SOLUTION INTRAMUSCULAR; INTRAVENOUS; SUBCUTANEOUS at 04:18

## 2019-09-14 ASSESSMENT — ENCOUNTER SYMPTOMS
ABDOMINAL PAIN: 1
NAUSEA: 1
MYALGIAS: 0
FEVER: 0
VOMITING: 0
HEADACHES: 0
FLANK PAIN: 0
CHILLS: 0
CONSTIPATION: 0
SHORTNESS OF BREATH: 0
DIARRHEA: 0

## 2019-09-14 NOTE — ED PROVIDER NOTES
ED Provider Note    ED Provider Note    Primary care provider: KIRAN Yuen  Means of arrival: POV  History obtained from: patient  History limited by: None    CHIEF COMPLAINT  Chief Complaint   Patient presents with   • LLQ Pain     x1 day, hx of cyst       HPI  Lizz Storey is a 34 y.o. female who presents to the Emergency Department with a chief complaint of left lower quadrant abdominal pain.  Patient states that she was seen by her OB/GYN, Dr. Bravo on Tuesday and the pain started on Thursday.  Patient has a history of endometriosis and fibroid tumors in her uterus.  She previously had been on Lupron and has done well however, this medication was discontinued about 6 months ago and she was started on her Ormisa and has done well on this medication although she has had a few episodes of bleeding that have been somewhat concerning to him so he has been following her for that.  Patient complains of nausea but no vomiting.  No diarrhea and no urinary complaints.  She is not sexually active.  She denies vaginal bleeding or discharge.  No other systemic symptoms.  No chest pain or shortness of breath.  No headache.  No fever.  Typically, associated with her endometriosis is left lower quadrant pain.  She has spoken with her doctor regarding hysterectomy however, she does not have children so they were trying to avoid this.    REVIEW OF SYSTEMS  Review of Systems   Constitutional: Negative for chills and fever.   HENT: Negative for congestion.    Respiratory: Negative for shortness of breath.    Cardiovascular: Negative for chest pain.   Gastrointestinal: Positive for abdominal pain and nausea. Negative for constipation, diarrhea and vomiting.   Genitourinary: Negative for flank pain.   Musculoskeletal: Negative for myalgias.   Neurological: Negative for headaches.   All other systems reviewed and are negative.      PAST MEDICAL HISTORY   has a past medical history of ASTHMA, Cyst of ovary, left,  "Endometriosis, Fibroid, uterus, Genital herpes in women, HPV in female, Kidney infection, and Psychiatric disorder.    SURGICAL HISTORY   has a past surgical history that includes jean by laparoscopy; laparoscopy; other abdominal surgery; and appendectomy.    SOCIAL HISTORY  Social History     Tobacco Use   • Smoking status: Former Smoker     Packs/day: 0.25     Years: 15.00     Pack years: 3.75   • Smokeless tobacco: Never Used   • Tobacco comment: 1.5 years   Substance Use Topics   • Alcohol use: No     Comment: 2 times per year   • Drug use: Yes     Types: Inhaled, Marijuana     Comment: marijuana 2 x per month      Social History     Substance and Sexual Activity   Drug Use Yes   • Types: Inhaled, Marijuana    Comment: marijuana 2 x per month       FAMILY HISTORY  No family history on file.    CURRENT MEDICATIONS  Home Medications    **Home medications have not yet been reviewed for this encounter**         ALLERGIES  Allergies   Allergen Reactions   • Carrot [Daucus Carota] Anaphylaxis     Only raw carrot; cooked carrot ok.   • Ciprofloxacin Hives   • Keflex      Mild hand swelling   • Ketorolac Tromethamine Rash   • Morphine Hives   • Penicillins Rash       PHYSICAL EXAM  VITAL SIGNS: /65   Pulse 60   Temp 36.2 °C (97.2 °F) (Temporal)   Resp 16   Ht 1.6 m (5' 3\")   Wt 80.8 kg (178 lb 2.1 oz)   SpO2 90%   BMI 31.55 kg/m²   Vitals reviewed.  Constitutional: Patient is oriented to person, place, and time. Appears well-developed and well-nourished. Moderate distress.    Head: Normocephalic and atraumatic.   Ears: Normal external ears bilaterally.   Mouth/Throat: Oropharynx is clear and moist, no exudates.   Eyes: Conjunctivae are normal.   Neck: Normal range of motion.  Cardiovascular: Normal rate, regular rhythm and normal heart sounds.  Pulmonary/Chest: Effort normal and breath sounds normal. No respiratory distress, no wheezes, rhonchi, or rales.   Abdominal: Soft. Bowel sounds are normal. There " is diffuse tenderness, worst in the left lower quadrant. No rebound or guarding, or peritoneal signs. No CVA tenderness.  Musculoskeletal: No edema and no tenderness.   Neurological: No focal deficits.   Skin: Skin is warm and dry. No erythema. No pallor.   Psychiatric: Patient has a normal mood and affect, given circumstances.     LABS  Results for orders placed or performed during the hospital encounter of 09/14/19   CBC WITH DIFFERENTIAL   Result Value Ref Range    WBC 8.3 4.8 - 10.8 K/uL    RBC 5.05 4.20 - 5.40 M/uL    Hemoglobin 15.1 12.0 - 16.0 g/dL    Hematocrit 46.3 37.0 - 47.0 %    MCV 91.7 81.4 - 97.8 fL    MCH 29.9 27.0 - 33.0 pg    MCHC 32.6 (L) 33.6 - 35.0 g/dL    RDW 43.8 35.9 - 50.0 fL    Platelet Count 248 164 - 446 K/uL    MPV 10.6 9.0 - 12.9 fL    Neutrophils-Polys 48.60 44.00 - 72.00 %    Lymphocytes 37.70 22.00 - 41.00 %    Monocytes 5.00 0.00 - 13.40 %    Eosinophils 8.00 (H) 0.00 - 6.90 %    Basophils 0.60 0.00 - 1.80 %    Immature Granulocytes 0.10 0.00 - 0.90 %    Nucleated RBC 0.00 /100 WBC    Neutrophils (Absolute) 4.02 2.00 - 7.15 K/uL    Lymphs (Absolute) 3.11 1.00 - 4.80 K/uL    Monos (Absolute) 0.41 0.00 - 0.85 K/uL    Eos (Absolute) 0.66 (H) 0.00 - 0.51 K/uL    Baso (Absolute) 0.05 0.00 - 0.12 K/uL    Immature Granulocytes (abs) 0.01 0.00 - 0.11 K/uL    NRBC (Absolute) 0.00 K/uL   COMP METABOLIC PANEL   Result Value Ref Range    Sodium 138 135 - 145 mmol/L    Potassium 3.8 3.6 - 5.5 mmol/L    Chloride 107 96 - 112 mmol/L    Co2 23 20 - 33 mmol/L    Anion Gap 8.0 0.0 - 11.9    Glucose 107 (H) 65 - 99 mg/dL    Bun 8 8 - 22 mg/dL    Creatinine 0.63 0.50 - 1.40 mg/dL    Calcium 8.8 8.5 - 10.5 mg/dL    AST(SGOT) 20 12 - 45 U/L    ALT(SGPT) 36 2 - 50 U/L    Alkaline Phosphatase 108 (H) 30 - 99 U/L    Total Bilirubin 0.4 0.1 - 1.5 mg/dL    Albumin 4.1 3.2 - 4.9 g/dL    Total Protein 6.7 6.0 - 8.2 g/dL    Globulin 2.6 1.9 - 3.5 g/dL    A-G Ratio 1.6 g/dL   URINALYSIS CULTURE, IF INDICATED    Result Value Ref Range    Color Yellow     Character Clear     Specific Gravity 1.022 <1.035    Ph 6.5 5.0 - 8.0    Glucose Negative Negative mg/dL    Ketones Negative Negative mg/dL    Protein Negative Negative mg/dL    Bilirubin Negative Negative    Urobilinogen, Urine 0.2 Negative    Nitrite Negative Negative    Leukocyte Esterase Negative Negative    Occult Blood Negative Negative    Micro Urine Req see below    HCG QUALITATIVE UR (Lab)   Result Value Ref Range    Beta-Hcg Urine Negative Negative   ESTIMATED GFR   Result Value Ref Range    GFR If African American >60 >60 mL/min/1.73 m 2    GFR If Non African American >60 >60 mL/min/1.73 m 2       All labs reviewed by me.      RADIOLOGY  US-PELVIC COMPLETE (TRANSABDOMINAL/TRANSVAGINAL) (COMBO)   Final Result            Small amount of fluid in the cervix. Prominent endometrium could still be within normal limits for menstrual female.      Grossly similar ill-defined 1.9 cm fibroid in the posterior uterine fundus.           The radiologist's interpretation of all radiological studies have been reviewed by me.    COURSE & MEDICAL DECISION MAKING  Pertinent Labs & Imaging studies reviewed. (See chart for details)    Obtained and reviewed past medical records.  Patient's last encounter was on July 6 of this year.  She was seen for pelvic pain and left lower quadrant pain.  Noted to have a history of endometriosis.    3:51 AM - Patient seen and examined at bedside.  This is a pleasant 34-year-old female, who presents with left lower quadrant abdominal pain that started Thursday.  Now intolerable, prompting her ED visit.  She is had similar episodes of pain such as this in the past.  She has a long history of ovarian cysts, endometriosis and uterine fibroids.  IV started, labs drawn per nursing protocols.  We will also obtain an ultrasound to further evaluate her symptoms.    Differential includes but not limited to: Endometriosis, uterine fibroids, ovarian cysts,  UTI, less likely colitis or kidney stones.  Patient is status post appendectomy and cholecystectomy.    0710AM patient's reevaluated bedside.  She is feeling better.  We discussed ultrasound results including a fibroid which she was aware of.  Otherwise labs were unrevealing.  White blood cell counts normal.  Normal H&H.  No neutrophilic shift.  Chemistry was mostly normal.  No evidence for UTI on UA.  At this time, patient feels comfortable going home, she assures me that she can follow-up with Dr. Bravo or 1 of his partners on Monday.  He will be discharged home with a short course of pain medication.  She is also requesting refill of her albuterol inhaler.  I think this is a reasonable disposition.  I suspect, that this is likely related to her endometriosis and fibroid.  Patient agrees.      She is in stable condition on discharge     FINAL IMPRESSION  1. Left lower quadrant pain    2. Uterine leiomyoma, unspecified location    3. Endometriosis    4. Medication refill

## 2019-09-14 NOTE — ED TRIAGE NOTES
"Chief Complaint   Patient presents with   • LLQ Pain     x1 day, hx of cyst     /72   Pulse 72   Temp 36 °C (96.8 °F) (Temporal)   Resp 16   Ht 1.6 m (5' 3\")   Wt 80.8 kg (178 lb 2.1 oz)   SpO2 99%     Pt ambulates with a steady gait to triage guarding her L side. Pt was seen by her OBGYN on Tues. Pt reports the pain began Thurs worsening to intolerable this evening. Pt has hx of fibroids, cysts, endometriosis.   "

## 2019-09-14 NOTE — ED NOTES
Pt endorses improvement to pain (4/10) since receiving IV pain meds.  She is resting comfortably.

## 2019-09-17 ENCOUNTER — PATIENT MESSAGE (OUTPATIENT)
Dept: MEDICAL GROUP | Facility: MEDICAL CENTER | Age: 34
End: 2019-09-17

## 2019-09-17 NOTE — TELEPHONE ENCOUNTER
From: Lizz Storey  To: KIRAN Yuen  Sent: 9/17/2019 10:39 AM PDT  Subject: Non-Urgent Medical Question    good morning dr weaver!!! long time no talk!! july been doing VERY well since the last time i saw you and cut out izzy-aid and HIGH sugar content and more h2o and have not had an issue (knock on wood) since i had my ultrasound!! i have however been in er on Saturday at [facility]. i saw dr allen who was extremely gentle and awesome , i saw her for endometriosis pain that was and still is uncontrolled july contacted dr fink my gyno but he hasnt gotten back to me but i also dont want to have to go back to er if he doesn't follow up with me. so my question is, i know your not a gyno but if he doesn't contact me for my visit and a plan am i able to see you as a back up for female issues? sorry for the drawn out message!

## 2019-09-17 NOTE — TELEPHONE ENCOUNTER
From: Lizz Storey  To: KIRAN Yuen  Sent: 9/17/2019 2:21 PM PDT  Subject: Non-Urgent Medical Question    ok, heres hoping that someone from his office actually follows up!!   ----- Message -----  From: KIRAN Yuen  Sent: 9/17/2019 1:30 PM PDT  To: Lizz Storey  Subject: RE: Non-Urgent Medical Question  The treatment that they have you on now is more than I can manage in primary care, so it would be preferable for you to continue to see him for this issue if possible.    ----- Message -----   From: Lizz Storey   Sent: 9/17/2019 10:39 AM PDT   To: KIRAN Yuen  Subject: Non-Urgent Medical Question    good morning dr weaver!!! long time no talk!! july been doing VERY well since the last time i saw you and cut out izzy-aid and HIGH sugar content and more h2o and have not had an issue (knock on wood) since i had my ultrasound!! i have however been in er on Saturday at [facility]. i saw dr allen who was extremely gentle and awesome , i saw her for endometriosis pain that was and still is uncontrolled july contacted dr fink my gyno but he hasnt gotten back to me but i also dont want to have to go back to er if he doesn't follow up with me. so my question is, i know your not a gyno but if he doesn't contact me for my visit and a plan am i able to see you as a back up for female issues? sorry for the drawn out message!

## 2019-09-20 ENCOUNTER — PATIENT MESSAGE (OUTPATIENT)
Dept: MEDICAL GROUP | Facility: MEDICAL CENTER | Age: 34
End: 2019-09-20

## 2019-09-23 ENCOUNTER — PATIENT MESSAGE (OUTPATIENT)
Dept: MEDICAL GROUP | Facility: MEDICAL CENTER | Age: 34
End: 2019-09-23

## 2019-09-23 NOTE — TELEPHONE ENCOUNTER
From: Lizz Storey  To: KIRAN Yuen  Sent: 9/20/2019 1:03 PM PDT  Subject: Non-Urgent Medical Question    aso... july called my gyno EVRYDAY!!! and never got a call back until he of course left the office this afternoon.. do you recommend someone because i dont want to have to rely on the er to help my pain, i feel like thats the whole reason i have a gyno!! thank you for your help i just dont know what to do! i  ----- Message -----  From: KIRAN Yuen  Sent: 9/17/2019 2:33 PM PDT  To: Lizz Storey  Subject: RE: Non-Urgent Medical Question  Keep calling! If you really get in a bind let us know.    ----- Message -----   From: Lizz Stroey   Sent: 9/17/2019 2:21 PM PDT   To: KIRAN Yuen  Subject: Non-Urgent Medical Question    ok, heres hoping that someone from his office actually follows up!!   ----- Message -----  From: KIRAN Yuen  Sent: 9/17/2019 1:30 PM PDT  To: Lizz Storey  Subject: RE: Non-Urgent Medical Question  The treatment that they have you on now is more than I can manage in primary care, so it would be preferable for you to continue to see him for this issue if possible.    ----- Message -----   From: Lizz Storey   Sent: 9/17/2019 10:39 AM PDT   To: KIRAN Yuen  Subject: Non-Urgent Medical Question    good morning dr weaver!!! long time no talk!! july been doing VERY well since the last time i saw you and cut out izzy-aid and HIGH sugar content and more h2o and have not had an issue (knock on wood) since i had my ultrasound!! i have however been in er on Saturday at [facility]. i saw dr allen who was extremely gentle and awesome , i saw her for endometriosis pain that was and still is uncontrolled july contacted dr fink my gyno but he hasnt gotten back to me but i also dont want to have to go back to er if he doesn't follow up with me. so my question is, i know your not a gyno but if he doesn't contact me  for my visit and a plan am i able to see you as a back up for female issues? sorry for the drawn out message!

## 2019-09-24 ENCOUNTER — PATIENT MESSAGE (OUTPATIENT)
Dept: MEDICAL GROUP | Facility: MEDICAL CENTER | Age: 34
End: 2019-09-24

## 2019-09-24 NOTE — TELEPHONE ENCOUNTER
From: Lizz Storey  To: KIRAN Yuen  Sent: 9/23/2019 5:22 PM PDT  Subject: Non-Urgent Medical Question    I made an apt for 9/24 @1020 because that's literally all you had left until October.. Im not a 100% sure I can make it but I WANT to be seen by someone and see why this is happening.. Do you have anything you can fit me in that's more than 24hr notice for my employer?.. Sorry for being difficult...   ----- Message -----  From: KIRAN Yuen  Sent: 9/23/2019 8:54 AM PDT  To: Lizz Storey  Subject: RE: Non-Urgent Medical Question  Samuel Zamudio,  We can see you in the office if you need refills of the pain medication. Ultimately I question if your endometriosis treatment needs to be changes, which I would have to defer to your specialist. Are you taking ibuprofen as well?  Sharon LUIS       ----- Message -----   From: Lizz Storey   Sent: 9/20/2019 1:03 PM PDT   To: KIRAN Yuen  Subject: Non-Urgent Medical Question    aso... july called my gyno EVRYDAY!!! and never got a call back until he of course left the office this afternoon.. do you recommend someone because i dont want to have to rely on the er to help my pain, i feel like thats the whole reason i have a gyno!! thank you for your help i just dont know what to do! i  ----- Message -----  From: KIRAN Yuen  Sent: 9/17/2019 2:33 PM PDT  To: Lizz Storey  Subject: RE: Non-Urgent Medical Question  Keep calling! If you really get in a bind let us know.    ----- Message -----   From: Lizz Storey   Sent: 9/17/2019 2:21 PM PDT   To: KIRAN Yuen  Subject: Non-Urgent Medical Question    ok, heres hoping that someone from his office actually follows up!!   ----- Message -----  From: KIRAN Yuen  Sent: 9/17/2019 1:30 PM PDT  To: Lizz Storey  Subject: RE: Non-Urgent Medical Question  The treatment that they have you on now is more than I can manage in  primary care, so it would be preferable for you to continue to see him for this issue if possible.    ----- Message -----   From: Lizz Storey   Sent: 9/17/2019 10:39 AM PDT   To: KIRAN Yuen  Subject: Non-Urgent Medical Question    good morning dr weaver!!! long time no talk!! july been doing VERY well since the last time i saw you and cut out izzy-aid and HIGH sugar content and more h2o and have not had an issue (knock on wood) since i had my ultrasound!! i have however been in er on Saturday at [facility]. i saw dr allen who was extremely gentle and awesome , i saw her for endometriosis pain that was and still is uncontrolled july contacted dr fink my gyno but he hasnt gotten back to me but i also dont want to have to go back to er if he doesn't follow up with me. so my question is, i know your not a gyno but if he doesn't contact me for my visit and a plan am i able to see you as a back up for female issues? sorry for the drawn out message!

## 2019-09-24 NOTE — TELEPHONE ENCOUNTER
From: Lizz Storey  To: KIRAN Yuen  Sent: 9/23/2019 2:22 PM PDT  Subject: Non-Urgent Medical Question    i am.. i have ibuprofen 800.. and im on orlissa which we switched from lupron and lupron was awesome! but i was on it for about 2 1/2 yrs.. i would appreciate a refill from you since i called them again and havent heard back.. i would love to know of a specialist you recommend! i was trying to wait to have kids but im not sure thats in the cards for me...  ----- Message -----  From: KIRAN Yuen  Sent: 9/23/2019 8:54 AM PDT  To: Lizz Storey  Subject: RE: Non-Urgent Medical Question  Samuel Zamudio,  We can see you in the office if you need refills of the pain medication. Ultimately I question if your endometriosis treatment needs to be changes, which I would have to defer to your specialist. Are you taking ibuprofen as well?  Sharon LUIS       ----- Message -----   From: Lizz Storey   Sent: 9/20/2019 1:03 PM PDT   To: KIRAN Yuen  Subject: Non-Urgent Medical Question    aso... july called my gyno EVRYDAY!!! and never got a call back until he of course left the office this afternoon.. do you recommend someone because i dont want to have to rely on the er to help my pain, i feel like thats the whole reason i have a gyno!! thank you for your help i just dont know what to do! i  ----- Message -----  From: KIRAN Yuen  Sent: 9/17/2019 2:33 PM PDT  To: Lizz Storey  Subject: RE: Non-Urgent Medical Question  Keep calling! If you really get in a bind let us know.    ----- Message -----   From: Lizz Storey   Sent: 9/17/2019 2:21 PM PDT   To: KIRAN Yuen  Subject: Non-Urgent Medical Question    ok, heres hoping that someone from his office actually follows up!!   ----- Message -----  From: KIRAN Yuen  Sent: 9/17/2019 1:30 PM PDT  To: Lizz Storey  Subject: RE: Non-Urgent Medical Question  The  treatment that they have you on now is more than I can manage in primary care, so it would be preferable for you to continue to see him for this issue if possible.    ----- Message -----   From: Lizz Storey   Sent: 9/17/2019 10:39 AM PDT   To: KIRAN Yuen  Subject: Non-Urgent Medical Question    good morning dr weaver!!! long time no talk!! july been doing VERY well since the last time i saw you and cut out izzy-aid and HIGH sugar content and more h2o and have not had an issue (knock on wood) since i had my ultrasound!! i have however been in er on Saturday at [facility]. i saw dr allen who was extremely gentle and awesome , i saw her for endometriosis pain that was and still is uncontrolled july contacted dr fink my gyno but he hasnt gotten back to me but i also dont want to have to go back to er if he doesn't follow up with me. so my question is, i know your not a gyno but if he doesn't contact me for my visit and a plan am i able to see you as a back up for female issues? sorry for the drawn out message!

## 2019-09-25 ENCOUNTER — OFFICE VISIT (OUTPATIENT)
Dept: MEDICAL GROUP | Facility: MEDICAL CENTER | Age: 34
End: 2019-09-25
Payer: COMMERCIAL

## 2019-09-25 ENCOUNTER — SUPERVISING PHYSICIAN REVIEW (OUTPATIENT)
Dept: MEDICAL GROUP | Facility: MEDICAL CENTER | Age: 34
End: 2019-09-25

## 2019-09-25 VITALS
HEIGHT: 63 IN | BODY MASS INDEX: 31.71 KG/M2 | RESPIRATION RATE: 16 BRPM | WEIGHT: 179 LBS | HEART RATE: 82 BPM | TEMPERATURE: 98.2 F | OXYGEN SATURATION: 96 % | SYSTOLIC BLOOD PRESSURE: 100 MMHG | DIASTOLIC BLOOD PRESSURE: 60 MMHG

## 2019-09-25 DIAGNOSIS — N80.9 ENDOMETRIOSIS: ICD-10-CM

## 2019-09-25 PROCEDURE — 99214 OFFICE O/P EST MOD 30 MIN: CPT | Performed by: NURSE PRACTITIONER

## 2019-09-25 RX ORDER — OXYCODONE HYDROCHLORIDE AND ACETAMINOPHEN 5; 325 MG/1; MG/1
1 TABLET ORAL EVERY 8 HOURS PRN
Qty: 60 TAB | Refills: 0 | Status: SHIPPED | OUTPATIENT
Start: 2019-09-25 | End: 2019-10-15

## 2019-09-25 RX ORDER — IBUPROFEN 800 MG/1
800 TABLET ORAL EVERY 8 HOURS PRN
Qty: 90 TAB | Refills: 1 | Status: SHIPPED | OUTPATIENT
Start: 2019-09-25 | End: 2020-01-07 | Stop reason: SDUPTHER

## 2019-09-25 NOTE — LETTER
September 25, 2019        RE: Lizz Storey    To whom it may concern;    Lizz Storey was seen today in my office for medical appointment.  Please excuse her work absence due to health issues.    Please contact me with any questions.    Sincerely,            KRYSTYNA Yuen.

## 2019-09-25 NOTE — PROGRESS NOTES
Subjective:     Chief Complaint   Patient presents with   • Pain     Lizz Storey is a 34 y.o. female here today to follow up on:    Endometriosis  Chronic issue initially diagnosed as teenager. 2 prior exploratory laps. Pt has been on multiple therapies including OCP, depo-provera, then on lupron for about 2 years which was working reasonably well. This was discontinued in Aug 2018, switched to Orilissa d/t insurance issues. This had also been working reasonably well until Aug 2019 when she began having increase in pain. She has had intermittent flares since that time with current flare starting 1.5 weeks ago. She has missed 4 days of work. Tearful in the office today.  Current pain 7-8/10, worse with walking or voiding/ having BM. Feels pressure sensation in the pelvic region also with sharp LLL pain. Recent ER visit related to this, records reviewed.  She did have a right ovarian cyst noted but no acute abnormality.  She is taking ibuprofen 800 mg 3 times daily with food which helps slightly with the pain but is not controlling symptoms.  She had been given prescription for Percocet 5-325 at recent ER visit, 12 tablets.  She has taken the last of these this morning.  She is concerned with ongoing pain management as she waits to get into her specialist, she is waiting for callback from their office.  I have reviewed her  report which shows several Percocet prescriptions with low quantities from 12-15.  She has also had numerous ER visits for this issue.  This patient does history of prescription drug abuse, we have discussed that we will manage her pain conservatively in an attempt to keep her from needing repeat ER visits but to avoid risk of dependency, misuse/overuse.  Patient lives with her mother who is a good support person and helps to monitor her medication usage.  No side effects related to medication including sedation, constipation, withdrawal.  Ultimately the patient is looking for a new  treatment plan to better control her pain, she understands this may be surgical.  No bleeding, fever, chills, nausea, vaginal discharge, dysuria       Current medicines (including changes today)  Current Outpatient Medications   Medication Sig Dispense Refill   • Elagolix Sodium (ORILISSA) 200 MG Tab Take  by mouth.     • ibuprofen (MOTRIN) 800 MG Tab Take 1 Tab by mouth every 8 hours as needed. 90 Tab 1   • oxyCODONE-acetaminophen (PERCOCET) 5-325 MG Tab Take 1 Tab by mouth every 8 hours as needed for up to 20 days. 60 Tab 0   • albuterol 108 (90 Base) MCG/ACT Aero Soln inhalation aerosol Inhale 2 Puffs by mouth every four hours as needed. 1 Inhaler 0   • valacyclovir (VALTREX) 1 GM Tab Take 1 Tab by mouth every day. 5 Tab 5   • ondansetron (ZOFRAN ODT) 4 MG TABLET DISPERSIBLE Take 1 Tab by mouth every 8 hours as needed for Nausea. 30 Tab 0   • promethazine (PHENERGAN) 25 MG Tab Take 1 Tab by mouth every 6 hours as needed for Nausea/Vomiting. 30 Tab 0   • budesonide-formoterol (SYMBICORT) 80-4.5 MCG/ACT Aerosol Inhale 2 Puffs by mouth 2 Times a Day. 1 Inhaler 5   • ipratropium-albuterol (DUONEB) 0.5-2.5 (3) MG/3ML nebulizer solution 3 mL by Nebulization route every four hours as needed for Shortness of Breath. 30 Bullet 0   • acyclovir (ZOVIRAX) 400 MG tablet Take 400 mg by mouth every day.       No current facility-administered medications for this visit.      She  has a past medical history of ASTHMA, Cyst of ovary, left, Endometriosis, Fibroid, uterus, Genital herpes in women, HPV in female, Kidney infection, and Psychiatric disorder. She also has no past medical history of CAD (coronary artery disease), Cancer (HCC), Congestive heart failure (HCC), COPD, Diabetes, Hypertension, Liver disease, Seizure disorder (HCC), or Stroke (HCC).    ROS included above     Objective:     /60 (BP Location: Left arm, Patient Position: Sitting, BP Cuff Size: Adult)   Pulse 82   Temp 36.8 °C (98.2 °F) (Temporal)   Resp  "16   Ht 1.6 m (5' 3\")   Wt 81.2 kg (179 lb)   SpO2 96%  Body mass index is 31.71 kg/m².     Physical Exam:  General: Alert, oriented in no acute distress.  Eye contact is good, speech is normal, affect tearful  Lungs: clear to auscultation bilaterally, normal effort, no wheeze/ rhonchi/ rales.  CV: regular rate and rhythm, S1, S2, no murmur  Abdomen: soft, nondistended, bilateral lower abdominal discomfort with palpation.  No masses  Ext: no edema, color normal, vascularity normal, temperature normal    Assessment and Plan:   The following treatment plan was discussed   1. Endometriosis   long-standing history with recent increase in pain, several ER visits and missed days of work.  She is waiting for callback from her specialist.  I have also provided her with an alternative OB/GYN phone number, she may need to consider surgical consultation as she is failed multiple therapies.  We have discussed conservative management of her chronic pain related to endometriosis.  She does have history of drug overuse which is certainly a concern.  This is discussed in detail, she understands that ibuprofen should be used for baseline control and Percocet reserved for severe flares.  Risks and side effects discussed.  She lives with her mother who will help with medication management.  I have reviewed her  report which shows multiple prescriptions, commonly from ER, with low quantities of medication.  I have provided her with 60 tablets today which I expect will last at least the next month and hopefully avoid need for ER visits.  She will continue working to contact her specialist.  UDS obtained today.  Consent for opiate prescription reviewed and signed.  Risk for dependence, tolerance, addiction and withdrawal discussed  oxyCODONE-acetaminophen (PERCOCET) 5-325 MG Tab    Consent for Opiate Prescription    Pain Management Screen       Followup: as needed         Please note that this dictation was created using voice " recognition software. I have worked with consultants from the vendor as well as technical [facility] to optimize the interface. I have made every reasonable attempt to correct obvious errors, but I expect that there are errors of grammar and possibly content that I did not discover before finalizing the note.

## 2019-09-25 NOTE — ASSESSMENT & PLAN NOTE
Chronic issue initially diagnosed as teenager. 2 prior exploratory laps. Pt has been on multiple therapies including OCP, depo-provera, then on lupron for about 2 years which was working reasonably well. This was discontinued in Aug 2018, switched to Orilissa d/t insurance issues. This had also been working reasonably well until Aug 2019 when she began having increase in pain. She has had intermittent flares since that time with current flare starting 1.5 weeks ago. She has missed 4 days of work. Tearful in the office today.  Current pain 7-8/10, worse with walking or voiding/ having BM. Feels pressure sensation in the pelvic region also with sharp LLL pain. Recent ER visit related to this, records reviewed.  She did have a right ovarian cyst noted but no acute abnormality.  She is taking ibuprofen 800 mg 3 times daily with food which helps slightly with the pain but is not controlling symptoms.  She had been given prescription for Percocet 5-325 at recent ER visit, 12 tablets.  She has taken the last of these this morning.  She is concerned with ongoing pain management as she waits to get into her specialist, she is waiting for callback from their office.  I have reviewed her  report which shows several Percocet prescriptions with low quantities from 12-15.  She has also had numerous ER visits for this issue.  This patient does history of prescription drug abuse, we have discussed that we will manage her pain conservatively in an attempt to keep her from needing repeat ER visits but to avoid risk of dependency, misuse/overuse.  Patient lives with her mother who is a good support person and helps to monitor her medication usage.  No side effects related to medication including sedation, constipation, withdrawal.  Ultimately the patient is looking for a new treatment plan to better control her pain, she understands this may be surgical.  No bleeding, fever, chills, nausea, vaginal discharge, dysuria

## 2019-10-13 ENCOUNTER — PATIENT MESSAGE (OUTPATIENT)
Dept: MEDICAL GROUP | Facility: MEDICAL CENTER | Age: 34
End: 2019-10-13

## 2019-11-01 ENCOUNTER — APPOINTMENT (OUTPATIENT)
Dept: MEDICAL GROUP | Facility: MEDICAL CENTER | Age: 34
End: 2019-11-01
Payer: COMMERCIAL

## 2019-11-12 ENCOUNTER — OFFICE VISIT (OUTPATIENT)
Dept: MEDICAL GROUP | Facility: MEDICAL CENTER | Age: 34
End: 2019-11-12
Payer: COMMERCIAL

## 2019-11-12 VITALS
TEMPERATURE: 97.3 F | DIASTOLIC BLOOD PRESSURE: 76 MMHG | HEIGHT: 63 IN | RESPIRATION RATE: 16 BRPM | OXYGEN SATURATION: 96 % | SYSTOLIC BLOOD PRESSURE: 102 MMHG | BODY MASS INDEX: 32.78 KG/M2 | WEIGHT: 185 LBS | HEART RATE: 72 BPM

## 2019-11-12 DIAGNOSIS — N80.9 ENDOMETRIOSIS: ICD-10-CM

## 2019-11-12 PROCEDURE — 99214 OFFICE O/P EST MOD 30 MIN: CPT | Performed by: NURSE PRACTITIONER

## 2019-11-12 NOTE — ASSESSMENT & PLAN NOTE
Chronic issue contributing to chronic pelvic pain and missed work. She has been followed by Dr. Day but having difficulty with communication with the office and treatment plan.  She is now been out of [facility] for more than 2 weeks and has not heard back from her OB/GYN office.  Overall she did not feel that this treatment is working well for her.  She has continued to have frequent flares of pelvic pain.  She requests refill of oxycodone acetaminophen today.  Her last prescription was written in September at which time I did obtain a UDS.  This was positive for methadone which she had told me she was no longer taking.  She reports today that her last dose of methadone was at the end of August which would not be expected to have shown up in her test 9/30/2019.  We will need to repeat her UDS before renewing any controlled substance.  No current bleeding.  Not currently sexually active.  No nausea, vomiting

## 2019-11-12 NOTE — PROGRESS NOTES
Subjective:     Chief Complaint   Patient presents with   • Follow-Up     Lizz Storey is a 34 y.o. female here today to follow up on:    Endometriosis  Chronic issue contributing to chronic pelvic pain and missed work. She has been followed by Dr. Day but having difficulty with communication with the office and treatment plan.  She is now been out of [facility] for more than 2 weeks and has not heard back from her OB/GYN office.  Overall she did not feel that this treatment is working well for her.  She has continued to have frequent flares of pelvic pain.  She has an appointment on the 19th with Dr. Phillips for another opinion as well as Dr. Shell on the 23rd.  She requests refill of oxycodone acetaminophen today.  Her last prescription was written in September at which time I did obtain a UDS.  This was positive for methadone which she had told me she was no longer taking.  She reports today that her last dose of methadone was at the end of August which would not be expected to have shown up in her test 9/30/2019.  We will need to repeat her UDS before renewing any controlled substance.  No current bleeding.  Not currently sexually active.  No nausea, vomiting       Current medicines (including changes today)  Current Outpatient Medications   Medication Sig Dispense Refill   • Elagolix Sodium (ORILISSA) 200 MG Tab Take  by mouth.     • ibuprofen (MOTRIN) 800 MG Tab Take 1 Tab by mouth every 8 hours as needed. 90 Tab 1   • albuterol 108 (90 Base) MCG/ACT Aero Soln inhalation aerosol Inhale 2 Puffs by mouth every four hours as needed. 1 Inhaler 0   • valacyclovir (VALTREX) 1 GM Tab Take 1 Tab by mouth every day. 5 Tab 5   • ondansetron (ZOFRAN ODT) 4 MG TABLET DISPERSIBLE Take 1 Tab by mouth every 8 hours as needed for Nausea. 30 Tab 0   • promethazine (PHENERGAN) 25 MG Tab Take 1 Tab by mouth every 6 hours as needed for Nausea/Vomiting. 30 Tab 0   • budesonide-formoterol (SYMBICORT) 80-4.5 MCG/ACT  "Aerosol Inhale 2 Puffs by mouth 2 Times a Day. 1 Inhaler 5   • ipratropium-albuterol (DUONEB) 0.5-2.5 (3) MG/3ML nebulizer solution 3 mL by Nebulization route every four hours as needed for Shortness of Breath. 30 Bullet 0   • acyclovir (ZOVIRAX) 400 MG tablet Take 400 mg by mouth every day.       No current facility-administered medications for this visit.      She  has a past medical history of ASTHMA, Cyst of ovary, left, Endometriosis, Fibroid, uterus, Genital herpes in women, HPV in female, Kidney infection, and Psychiatric disorder. She also has no past medical history of CAD (coronary artery disease), Cancer (HCC), Congestive heart failure (HCC), COPD, Diabetes, Hypertension, Liver disease, Seizure disorder (HCC), or Stroke (HCC).    ROS included above     Objective:     /76 (BP Location: Left arm, Patient Position: Sitting, BP Cuff Size: Adult)   Pulse 72   Temp 36.3 °C (97.3 °F) (Temporal)   Resp 16   Ht 1.6 m (5' 3\")   Wt 83.9 kg (185 lb)   SpO2 96%  Body mass index is 32.77 kg/m².     Physical Exam:  General: Alert, oriented in no acute distress.  Eye contact is good, speech is normal, affect calm  Lungs: clear to auscultation bilaterally, normal effort, no wheeze/ rhonchi/ rales.  CV: regular rate and rhythm, S1, S2, no murmur  Abdomen: soft, nontender  Ext: no edema, color normal, vascularity normal, temperature normal    Assessment and Plan:   The following treatment plan was discussed  1. Endometriosis   patient is still having frequent flares of her endometriosis related pelvic pain on orilissa.  She is now awaiting an appointment with another OB/GYN for opinion on other treatment options.  She has missed work due to this issue and is requesting a work note for yesterday, I will provide her with this.  She also requests refill of oxycodone acetaminophen.  I have discussed findings of last UDS with her and have concerns about continuing any narcotic prescriptions given that she may be " using methadone. She tells me she discontinued methadone treatment at the end of August.  Will repeat UDS today, I will follow-up with her pending results.  Continue ibuprofen for now  Pain Management Screen       Followup: pending tests         Please note that this dictation was created using voice recognition software. I have worked with consultants from the vendor as well as technical experts from [facility] to optimize the interface. I have made every reasonable attempt to correct obvious errors, but I expect that there are errors of grammar and possibly content that I did not discover before finalizing the note.

## 2019-11-12 NOTE — LETTER
November 12, 2019        RE: Lizz Storey    To Whom It May Concern;    Lizz Storey was seen today in my office for a medical appointment. Please excuse her work absence 11/11/19 due to illness.    Please contact me with any questions.    Sincerely,                KRYSTYNA Yuen.

## 2019-11-13 ENCOUNTER — PATIENT MESSAGE (OUTPATIENT)
Dept: MEDICAL GROUP | Facility: MEDICAL CENTER | Age: 34
End: 2019-11-13

## 2019-11-13 NOTE — TELEPHONE ENCOUNTER
From: Lizz Storey  To: KIRAN Yuen  Sent: 11/12/2019 4:04 PM PST  Subject: RE:ob/gyn    so.. i was thinking since i left your office.. should i contact my pain management clinic to see if they can take me in?.. it was helpful when i had flare ups and i didnt know if maybe i should look into that.. they do take ins on Tuesdays and Thursdays so i wasnt sure if i should try and go that route again?..  ----- Message -----  From: KIRAN Yuen  Sent: 10/16/2019 4:46 PM PDT  To: Lizz Storey  Subject: RE:ob/gyn  Thank you, Had a great vacation!    ----- Message -----   From: Lizz Storey   Sent: 10/16/2019 2:40 PM PDT   To: KIRAN Yuen  Subject: RE:ob/gyn    no worries.. i knew you were on [place]! not same office just same vicinity! i just wanted to make sure i had the right person.. no worries.. i hope you had a great vacation!!    thank you again for everything you have done!!!   ----- Message -----  From: KIRAN Yuen  Sent: 10/13/2019 1:31 PM PDT  To: Lizz Storey  Subject: ob/gyn  Samuel Zamudio,  Sorry for the delayed response, I have been on vacation.  Yes, that is the correct Dr. Phillips. I did not realize she is in the same office as Sravan. If you want to try an entirely new office we can get a consult with [facility] .  Sharon LUIS

## 2019-11-15 ENCOUNTER — PATIENT MESSAGE (OUTPATIENT)
Dept: MEDICAL GROUP | Facility: MEDICAL CENTER | Age: 34
End: 2019-11-15

## 2019-11-15 NOTE — TELEPHONE ENCOUNTER
From: Lizz Storey  To: KIARN Yuen  Sent: 11/15/2019 2:06 PM PST  Subject: RE:ob/gyn    afternoon! any word on the urinalysis  ----- Message -----  From: KIRAN Yeun  Sent: 11/13/2019 12:17 PM PST  To: Lizz Storey  Subject: RE:ob/gyn  Hi Lizz,  That is a decision that you will need to make. From a primary care standpoint, I am unable to prescribe any pain medicine if you are using methadone. You have to choose one management plan.  Sharon LUIS      ----- Message -----   From: Lizz Storey   Sent: 11/12/2019 4:04 PM PST   To: KIRAN Yuen  Subject: RE:ob/gyn    so.. i was thinking since i left your office.. should i contact my pain management clinic to see if they can take me in?.. it was helpful when i had flare ups and i didnt know if maybe i should look into that.. they do take ins on Tuesdays and Thursdays so i wasnt sure if i should try and go that route again?..  ----- Message -----  From: KIRAN Yuen  Sent: 10/16/2019 4:46 PM PDT  To: Lizz Storey  Subject: RE:ob/gyn  Thank you, Had a great vacation!    ----- Message -----   From: Lizz Storey   Sent: 10/16/2019 2:40 PM PDT   To: KIRAN Yuen  Subject: RE:ob/gyn    no worries.. i knew you were on [place]! not same office just same vicinity! i just wanted to make sure i had the right person.. no worries.. i hope you had a great vacation!!    thank you again for everything you have done!!!   ----- Message -----  From: KIRAN Yuen  Sent: 10/13/2019 1:31 PM PDT  To: Lizz Storey  Subject: ob/gyn  Samuel Zamudio,  Chery for the delayed response, I have been on vacation.  Yes, that is the correct Dr. Phillips. I did not realize she is in the same office as Sravan. If you want to try an entirely new office we can get a consult with [facility] .  Sharon LUIS

## 2019-11-19 ENCOUNTER — PATIENT MESSAGE (OUTPATIENT)
Dept: MEDICAL GROUP | Facility: MEDICAL CENTER | Age: 34
End: 2019-11-19

## 2019-11-19 DIAGNOSIS — R11.2 NON-INTRACTABLE VOMITING WITH NAUSEA, UNSPECIFIED VOMITING TYPE: ICD-10-CM

## 2019-11-19 RX ORDER — ONDANSETRON 4 MG/1
4 TABLET, ORALLY DISINTEGRATING ORAL EVERY 8 HOURS PRN
Qty: 30 TAB | Refills: 2 | Status: SHIPPED | OUTPATIENT
Start: 2019-11-19 | End: 2020-03-23 | Stop reason: SDUPTHER

## 2019-11-19 NOTE — TELEPHONE ENCOUNTER
From: Lizz Storey  To: KIRAN Yuen  Sent: 11/18/2019 2:28 PM PST  Subject: RE:ob/gyn    afternoon again!! i kmow you dont work Mondays, so if you dont see this till Tuesday thats ok... i was following up on my test result allllso wanted to see about getting a nausea prescription sent to [facility] on [address]?..   hope you had a great weekend!  ----- Message -----  From: KIRAN Yuen  Sent: 11/15/2019 2:18 PM PST  To: Lizz Storey  Subject: RE:ob/gyn  Nothing yet, sorry.    ----- Message -----   From: Lizz Storey   Sent: 11/15/2019 2:06 PM PST   To: KIRAN Yuen  Subject: RE:ob/gyn    afternoon! any word on the urinalysis  ----- Message -----  From: KIRAN Yuen  Sent: 11/13/2019 12:17 PM PST  To: Lizz Storey  Subject: RE:ob/gyn  Samuel Zamudio,  That is a decision that you will need to make. From a primary care standpoint, I am unable to prescribe any pain medicine if you are using methadone. You have to choose one management plan.  Sharon LUIS      ----- Message -----   From: Lizz Storey   Sent: 11/12/2019 4:04 PM PST   To: KIRAN Yuen  Subject: RE:ob/gyn    so.. i was thinking since i left your office.. should i contact my pain management clinic to see if they can take me in?.. it was helpful when i had flare ups and i didnt know if maybe i should look into that.. they do take ins on Tuesdays and Thursdays so i wasnt sure if i should try and go that route again?..  ----- Message -----  From: KIRAN Yuen  Sent: 10/16/2019 4:46 PM PDT  To: Lizz Storey  Subject: RE:ob/gyn  Thank you, Had a great vacation!    ----- Message -----   From: Lizz Storey   Sent: 10/16/2019 2:40 PM PDT   To: KIRAN Yuen  Subject: RE:ob/gyn    no worries.. i knew you were on [place]! not same office just same vicinity! i just wanted to make sure i had the right person.. no worries.. i hope you had a  great vacation!!    thank you again for everything you have done!!!   ----- Message -----  From: KIRAN Yuen  Sent: 10/13/2019 1:31 PM PDT  To: Lizz Storey  Subject: ob/gyn  Chery Montoya for the delayed response, I have been on vacation.  Yes, that is the correct Dr. Phillips. I did not realize she is in the same office as Sravan. If you want to try an entirely new office we can get a consult with [facility] .  Sharon LUIS

## 2019-11-20 ENCOUNTER — PATIENT MESSAGE (OUTPATIENT)
Dept: MEDICAL GROUP | Facility: MEDICAL CENTER | Age: 34
End: 2019-11-20

## 2019-11-20 PROBLEM — R82.5 POSITIVE URINE DRUG SCREEN: Status: ACTIVE | Noted: 2019-11-20

## 2019-11-20 NOTE — TELEPHONE ENCOUNTER
From: Lizz Storey  To: KIRAN Yuen  Sent: 11/20/2019 10:30 AM PST  Subject: RE:ob/gyn    i got your message on the test result.. im very confused, and i completely understand your take and wouldn't want to ever jeopardize it!!! ill most like;y go back to [facility] since i was a previous patient till i can get with a obgyn and figure this out!!! thank you for keeping me informed!!! enjoy the weather!!  ----- Message -----  From: KIRAN Yuen  Sent: 11/19/2019 7:13 AM PST  To: Lizzanat Storey  Subject: RE:ob/gyn  Samuel Zamudio,  I expect the result will come through today or tomorrow. It goes to an outside lab, so that takes a bit longer.  I will renew the nausea medication!  Please let me know if you have any questions.  Take care,  Sharon LUIS      ----- Message -----   From: Lizz Storey   Sent: 11/18/2019 2:28 PM PST   To: KIRAN Yuen  Subject: RE:ob/gyn    afternoon again!! i kmow you dont work Mondays, so if you dont see this till Tuesday thats ok... i was following up on my test result allllso wanted to see about getting a nausea prescription sent to [facility] on [address]?..   hope you had a great weekend!  ----- Message -----  From: KIRAN Yuen  Sent: 11/15/2019 2:18 PM PST  To: Lizz Storey  Subject: RE:ob/gyn  Nothing yet, sorry.    ----- Message -----   From: Lizz Storey   Sent: 11/15/2019 2:06 PM PST   To: KIRAN Yuen  Subject: RE:ob/gyn    afternoon! any word on the urinalysis  ----- Message -----  From: KIRAN Yuen  Sent: 11/13/2019 12:17 PM PST  To: Lizz Storey  Subject: RE:ob/gyn  Samuel Zamudio,  That is a decision that you will need to make. From a primary care standpoint, I am unable to prescribe any pain medicine if you are using methadone. You have to choose one management plan.  Sharon LUIS      ----- Message -----   From: Lizz Storey   Sent: 11/12/2019 4:04  PM PST   To: KIRAN Yuen  Subject: RE:ob/gyn    so.. i was thinking since i left your office.. should i contact my pain management clinic to see if they can take me in?.. it was helpful when i had flare ups and i didnt know if maybe i should look into that.. they do take ins on Tuesdays and Thursdays so i wasnt sure if i should try and go that route again?..  ----- Message -----  From: KIRAN Yuen  Sent: 10/16/2019 4:46 PM PDT  To: Lizz Storey  Subject: RE:ob/gyn  Thank you, Had a great vacation!    ----- Message -----   From: Lizz Storey   Sent: 10/16/2019 2:40 PM PDT   To: KIRAN Yuen  Subject: RE:ob/gyn    no worries.. i knew you were on [place]! not same office just same vicinity! i just wanted to make sure i had the right person.. no worries.. i hope you had a great vacation!!    thank you again for everything you have done!!!   ----- Message -----  From: KIRAN Yuen  Sent: 10/13/2019 1:31 PM PDT  To: Lizz Storey  Subject: ob/gyn  Samuel Zamudio,  Chery for the delayed response, I have been on vacation.  Yes, that is the correct Dr. Phillips. I did not realize she is in the same office as Sravan. If you want to try an entirely new office we can get a consult with [facility] .  Sharon LUIS

## 2019-11-21 ENCOUNTER — SUPERVISING PHYSICIAN REVIEW (OUTPATIENT)
Dept: MEDICAL GROUP | Facility: MEDICAL CENTER | Age: 34
End: 2019-11-21

## 2019-12-02 ENCOUNTER — PATIENT MESSAGE (OUTPATIENT)
Dept: MEDICAL GROUP | Facility: MEDICAL CENTER | Age: 34
End: 2019-12-02

## 2019-12-02 RX ORDER — IPRATROPIUM BROMIDE AND ALBUTEROL SULFATE 2.5; .5 MG/3ML; MG/3ML
3 SOLUTION RESPIRATORY (INHALATION) EVERY 4 HOURS PRN
Qty: 30 BULLET | Refills: 0 | Status: SHIPPED | OUTPATIENT
Start: 2019-12-02 | End: 2020-07-31

## 2019-12-03 NOTE — TELEPHONE ENCOUNTER
From: Lizz Storey  To: KIRAN Yuen  Sent: 12/2/2019 8:53 AM PST  Subject: Prescription Question    good morning! jst to keep you updated.. i did go to the clinic for pain management till i/obestebann figures this out, im on 11mgs liquid daily.. also i was hoping i could bother you to send a prescription to [address] for me for albuterol bullets for the nebulizer and if so also duo neb?.. tis the season of the flare up of asthma...

## 2020-01-07 ENCOUNTER — PATIENT MESSAGE (OUTPATIENT)
Dept: MEDICAL GROUP | Facility: MEDICAL CENTER | Age: 35
End: 2020-01-07

## 2020-01-07 DIAGNOSIS — B00.9 HSV-2 (HERPES SIMPLEX VIRUS 2) INFECTION: ICD-10-CM

## 2020-01-07 RX ORDER — IBUPROFEN 800 MG/1
800 TABLET ORAL EVERY 8 HOURS PRN
Qty: 90 TAB | Refills: 1 | Status: SHIPPED | OUTPATIENT
Start: 2020-01-07 | End: 2020-06-17 | Stop reason: SDUPTHER

## 2020-01-07 RX ORDER — FLUCONAZOLE 150 MG/1
150 TABLET ORAL DAILY
Qty: 1 TAB | Refills: 1 | Status: SHIPPED | OUTPATIENT
Start: 2020-01-07 | End: 2023-08-19

## 2020-01-07 RX ORDER — VALACYCLOVIR HYDROCHLORIDE 1 G/1
1000 TABLET, FILM COATED ORAL DAILY
Qty: 30 TAB | Refills: 5 | Status: SHIPPED | OUTPATIENT
Start: 2020-01-07 | End: 2023-08-19

## 2020-01-07 NOTE — LETTER
January 7, 2020        RE: Lizz Storey    To whom it may concern;    Lizz Storey is seen in my office for primary care.  Please excuse her work absence January 6, 2020 due to illness.    Please contact me with any questions.    Sincerely,        KRYSTYNA Yuen.    Signed electronically

## 2020-01-07 NOTE — TELEPHONE ENCOUNTER
From: Lizz Storey  To: KIRAN Yuen  Sent: 1/7/2020 7:47 AM PST  Subject: Non-Urgent Medical Question    Good Morning and happy new year... i was hoping i could bother you for a Dr note for yesterday monday the 6th due to some asthma related issues and female but they dont need to know that) .. i was alos hoping that maybe a diflucan and valtrex refill can be called into [facility] on [address]... if also the ibuprofen 800s?.. they are all out and of course when they are more needed...     sorry for bothering you and i hope you had a great holiday season!

## 2020-01-13 ENCOUNTER — HOSPITAL ENCOUNTER (EMERGENCY)
Facility: MEDICAL CENTER | Age: 35
End: 2020-01-13
Attending: EMERGENCY MEDICINE
Payer: COMMERCIAL

## 2020-01-13 ENCOUNTER — APPOINTMENT (OUTPATIENT)
Dept: RADIOLOGY | Facility: MEDICAL CENTER | Age: 35
End: 2020-01-13
Attending: EMERGENCY MEDICINE
Payer: COMMERCIAL

## 2020-01-13 VITALS
SYSTOLIC BLOOD PRESSURE: 142 MMHG | HEART RATE: 85 BPM | HEIGHT: 64 IN | WEIGHT: 180.78 LBS | OXYGEN SATURATION: 98 % | TEMPERATURE: 97.7 F | DIASTOLIC BLOOD PRESSURE: 80 MMHG | BODY MASS INDEX: 30.86 KG/M2 | RESPIRATION RATE: 16 BRPM

## 2020-01-13 DIAGNOSIS — R10.2 PELVIC PAIN: ICD-10-CM

## 2020-01-13 DIAGNOSIS — N80.9 ENDOMETRIOSIS: ICD-10-CM

## 2020-01-13 LAB
ALBUMIN SERPL BCP-MCNC: 4.2 G/DL (ref 3.2–4.9)
ALBUMIN/GLOB SERPL: 1.4 G/DL
ALP SERPL-CCNC: 47 U/L (ref 30–99)
ALT SERPL-CCNC: 18 U/L (ref 2–50)
ANION GAP SERPL CALC-SCNC: 12 MMOL/L (ref 0–11.9)
APPEARANCE UR: ABNORMAL
AST SERPL-CCNC: 14 U/L (ref 12–45)
BACTERIA #/AREA URNS HPF: ABNORMAL /HPF
BASOPHILS # BLD AUTO: 0.3 % (ref 0–1.8)
BASOPHILS # BLD: 0.03 K/UL (ref 0–0.12)
BILIRUB SERPL-MCNC: 0.3 MG/DL (ref 0.1–1.5)
BILIRUB UR QL STRIP.AUTO: NEGATIVE
BUN SERPL-MCNC: 13 MG/DL (ref 8–22)
CALCIUM SERPL-MCNC: 8.8 MG/DL (ref 8.5–10.5)
CHLORIDE SERPL-SCNC: 105 MMOL/L (ref 96–112)
CO2 SERPL-SCNC: 21 MMOL/L (ref 20–33)
COLOR UR: YELLOW
CREAT SERPL-MCNC: 0.79 MG/DL (ref 0.5–1.4)
EOSINOPHIL # BLD AUTO: 0.04 K/UL (ref 0–0.51)
EOSINOPHIL NFR BLD: 0.4 % (ref 0–6.9)
EPI CELLS #/AREA URNS HPF: ABNORMAL /HPF
ERYTHROCYTE [DISTWIDTH] IN BLOOD BY AUTOMATED COUNT: 49.4 FL (ref 35.9–50)
GLOBULIN SER CALC-MCNC: 3 G/DL (ref 1.9–3.5)
GLUCOSE SERPL-MCNC: 88 MG/DL (ref 65–99)
GLUCOSE UR STRIP.AUTO-MCNC: NEGATIVE MG/DL
HCT VFR BLD AUTO: 44.4 % (ref 37–47)
HGB BLD-MCNC: 14.4 G/DL (ref 12–16)
HYALINE CASTS #/AREA URNS LPF: ABNORMAL /LPF
IMM GRANULOCYTES # BLD AUTO: 0.03 K/UL (ref 0–0.11)
IMM GRANULOCYTES NFR BLD AUTO: 0.3 % (ref 0–0.9)
KETONES UR STRIP.AUTO-MCNC: >=160 MG/DL
LEUKOCYTE ESTERASE UR QL STRIP.AUTO: NEGATIVE
LIPASE SERPL-CCNC: <3 U/L (ref 11–82)
LYMPHOCYTES # BLD AUTO: 3.07 K/UL (ref 1–4.8)
LYMPHOCYTES NFR BLD: 33.6 % (ref 22–41)
MCH RBC QN AUTO: 30.6 PG (ref 27–33)
MCHC RBC AUTO-ENTMCNC: 32.4 G/DL (ref 33.6–35)
MCV RBC AUTO: 94.3 FL (ref 81.4–97.8)
MICRO URNS: ABNORMAL
MONOCYTES # BLD AUTO: 0.35 K/UL (ref 0–0.85)
MONOCYTES NFR BLD AUTO: 3.8 % (ref 0–13.4)
NEUTROPHILS # BLD AUTO: 5.61 K/UL (ref 2–7.15)
NEUTROPHILS NFR BLD: 61.6 % (ref 44–72)
NITRITE UR QL STRIP.AUTO: NEGATIVE
NRBC # BLD AUTO: 0 K/UL
NRBC BLD-RTO: 0 /100 WBC
PH UR STRIP.AUTO: 6 [PH] (ref 5–8)
PLATELET # BLD AUTO: 289 K/UL (ref 164–446)
PMV BLD AUTO: 11.2 FL (ref 9–12.9)
POTASSIUM SERPL-SCNC: 3.5 MMOL/L (ref 3.6–5.5)
PROT SERPL-MCNC: 7.2 G/DL (ref 6–8.2)
PROT UR QL STRIP: 100 MG/DL
RBC # BLD AUTO: 4.71 M/UL (ref 4.2–5.4)
RBC # URNS HPF: ABNORMAL /HPF
RBC UR QL AUTO: ABNORMAL
SODIUM SERPL-SCNC: 138 MMOL/L (ref 135–145)
SP GR UR STRIP.AUTO: 1.04
UROBILINOGEN UR STRIP.AUTO-MCNC: 1 MG/DL
WBC # BLD AUTO: 9.1 K/UL (ref 4.8–10.8)
WBC #/AREA URNS HPF: ABNORMAL /HPF

## 2020-01-13 PROCEDURE — 83690 ASSAY OF LIPASE: CPT

## 2020-01-13 PROCEDURE — 700111 HCHG RX REV CODE 636 W/ 250 OVERRIDE (IP): Performed by: EMERGENCY MEDICINE

## 2020-01-13 PROCEDURE — 99285 EMERGENCY DEPT VISIT HI MDM: CPT

## 2020-01-13 PROCEDURE — 96375 TX/PRO/DX INJ NEW DRUG ADDON: CPT

## 2020-01-13 PROCEDURE — 96374 THER/PROPH/DIAG INJ IV PUSH: CPT

## 2020-01-13 PROCEDURE — 85025 COMPLETE CBC W/AUTO DIFF WBC: CPT

## 2020-01-13 PROCEDURE — A9270 NON-COVERED ITEM OR SERVICE: HCPCS | Performed by: EMERGENCY MEDICINE

## 2020-01-13 PROCEDURE — 80053 COMPREHEN METABOLIC PANEL: CPT

## 2020-01-13 PROCEDURE — 76856 US EXAM PELVIC COMPLETE: CPT

## 2020-01-13 PROCEDURE — 96376 TX/PRO/DX INJ SAME DRUG ADON: CPT

## 2020-01-13 PROCEDURE — 81001 URINALYSIS AUTO W/SCOPE: CPT

## 2020-01-13 PROCEDURE — 700102 HCHG RX REV CODE 250 W/ 637 OVERRIDE(OP): Performed by: EMERGENCY MEDICINE

## 2020-01-13 RX ORDER — OXYCODONE HYDROCHLORIDE 5 MG/1
5 TABLET ORAL ONCE
Status: COMPLETED | OUTPATIENT
Start: 2020-01-13 | End: 2020-01-13

## 2020-01-13 RX ORDER — ONDANSETRON 2 MG/ML
4 INJECTION INTRAMUSCULAR; INTRAVENOUS ONCE
Status: COMPLETED | OUTPATIENT
Start: 2020-01-13 | End: 2020-01-13

## 2020-01-13 RX ORDER — HYDROMORPHONE HYDROCHLORIDE 1 MG/ML
0.5 INJECTION, SOLUTION INTRAMUSCULAR; INTRAVENOUS; SUBCUTANEOUS
Status: DISCONTINUED | OUTPATIENT
Start: 2020-01-13 | End: 2020-01-13 | Stop reason: HOSPADM

## 2020-01-13 RX ORDER — OXYCODONE HYDROCHLORIDE 5 MG/1
5 TABLET ORAL EVERY 4 HOURS PRN
Qty: 5 TAB | Refills: 0 | Status: SHIPPED | OUTPATIENT
Start: 2020-01-13 | End: 2020-01-14

## 2020-01-13 RX ADMIN — ONDANSETRON 4 MG: 2 INJECTION INTRAMUSCULAR; INTRAVENOUS at 16:24

## 2020-01-13 RX ADMIN — HYDROMORPHONE HYDROCHLORIDE 0.5 MG: 1 INJECTION, SOLUTION INTRAMUSCULAR; INTRAVENOUS; SUBCUTANEOUS at 16:24

## 2020-01-13 RX ADMIN — HYDROMORPHONE HYDROCHLORIDE 0.5 MG: 1 INJECTION, SOLUTION INTRAMUSCULAR; INTRAVENOUS; SUBCUTANEOUS at 17:11

## 2020-01-13 RX ADMIN — OXYCODONE HYDROCHLORIDE 5 MG: 5 TABLET ORAL at 18:00

## 2020-01-13 NOTE — ED PROVIDER NOTES
"ED Provider Note    Scribed for Brian Duran M.D. by Kiran Butts. 1/13/2020  4:00 PM    Primary care provider: KIRAN Yuen  Means of arrival: Walk-in  History obtained from: Patient  History limited by: None    CHIEF COMPLAINT  Chief Complaint   Patient presents with   • Vaginal Pain     pt states hx of endometriosis. pt endorses \"having period for approx 8 day.\" pt states 8/10 pain \"inside.\"  pt denies painful urination.    • Vaginal Bleeding       HPI  Lizz Storey is a 34 y.o. female with a history of endometriosis and ovarian cysts who presents to the Emergency Department complaining of lower pelvic pain that began eight days ago and has progressively worsened since onset. She is on hormone injections for her endometriosis. Her pain improved after her last round of injections, but worsened again three days ago. Patient reports associated vaginal bleeding. She denies vomiting or diarrhea. Patient reports chronic nausea at baseline and takes regular Zofran. She is also taking methadone 8 mg daily. She underwent a prior appendectomy and cholecystectomy.    REVIEW OF SYSTEMS  Pertinent positives include pelvic pain, vaginal bleeding. Pertinent negatives include no vomiting or diarrhea.  All other systems reviewed and negative.    PAST MEDICAL HISTORY   has a past medical history of ASTHMA, Cyst of ovary, left, Endometriosis, Fibroid, uterus, Genital herpes in women, HPV in female, Kidney infection, and Psychiatric disorder.    SURGICAL HISTORY   has a past surgical history that includes jean by laparoscopy; laparoscopy; other abdominal surgery; and appendectomy.    SOCIAL HISTORY  Social History     Tobacco Use   • Smoking status: Former Smoker     Packs/day: 0.25     Years: 15.00     Pack years: 3.75   • Smokeless tobacco: Never Used   • Tobacco comment: 1.5 years   Substance Use Topics   • Alcohol use: No     Comment: 2 times per year   • Drug use: Yes     Types: Inhaled, " "Marijuana     Comment: marijuana 2 x per month      Social History     Substance and Sexual Activity   Drug Use Yes   • Types: Inhaled, Marijuana    Comment: marijuana 2 x per month       CURRENT MEDICATIONS  No current facility-administered medications on file prior to encounter.      Current Outpatient Medications on File Prior to Encounter   Medication Sig Dispense Refill   • norethindrone (AYGESTIN) 5 MG tablet Take 10 mg by mouth every day.     • valacyclovir (VALTREX) 1 GM Tab Take 1 Tab by mouth every day. 30 Tab 5   • ibuprofen (MOTRIN) 800 MG Tab Take 1 Tab by mouth every 8 hours as needed. 90 Tab 1   • fluconazole (DIFLUCAN) 150 MG tablet Take 1 Tab by mouth every day. 1 Tab 1   • ipratropium-albuterol (DUONEB) 0.5-2.5 (3) MG/3ML nebulizer solution 3 mL by Nebulization route every four hours as needed for Shortness of Breath. 30 Bullet 0   • ondansetron (ZOFRAN ODT) 4 MG TABLET DISPERSIBLE Take 1 Tab by mouth every 8 hours as needed for Nausea. 30 Tab 2   • Elagolix Sodium (ORILISSA) 200 MG Tab Take  by mouth.     • albuterol 108 (90 Base) MCG/ACT Aero Soln inhalation aerosol Inhale 2 Puffs by mouth every four hours as needed. 1 Inhaler 0   • promethazine (PHENERGAN) 25 MG Tab Take 1 Tab by mouth every 6 hours as needed for Nausea/Vomiting. 30 Tab 0   • budesonide-formoterol (SYMBICORT) 80-4.5 MCG/ACT Aerosol Inhale 2 Puffs by mouth 2 Times a Day. 1 Inhaler 5   • acyclovir (ZOVIRAX) 400 MG tablet Take 400 mg by mouth every day.         ALLERGIES  Allergies   Allergen Reactions   • Carrot [Daucus Carota] Anaphylaxis     Only raw carrot; cooked carrot ok.   • Ciprofloxacin Hives   • Keflex      Mild hand swelling   • Ketorolac Tromethamine Rash   • Morphine Hives   • Penicillins Rash       PHYSICAL EXAM  VITAL SIGNS: /86   Pulse 85   Temp 36.5 °C (97.7 °F) (Temporal)   Resp 18   Ht 1.626 m (5' 4\")   Wt 82 kg (180 lb 12.4 oz)   LMP 01/05/2020 (Approximate)   SpO2 98%   BMI 31.03 kg/m² "     Constitutional: Well developed, Well nourished, Moderate distress, Non-toxic appearance.   HENT: Normocephalic, Atraumatic, Bilateral external ears normal, Oropharynx moist, No oral exudates.   Eyes: PERRLA, EOMI, Conjunctiva normal, No discharge.   Neck: No tenderness, Supple, No stridor.   Lymphatic: No lymphadenopathy noted.   Cardiovascular: Normal heart rate, Normal rhythm.   Thorax & Lungs: Clear to auscultation bilaterally, No respiratory distress, No wheezing, No crackles.   Abdomen: Soft, No tenderness, No masses, No pulsatile masses.   Skin: Warm, Dry, No erythema, No rash.   Extremities:, No edema No cyanosis.   Musculoskeletal: No tenderness to palpation or major deformities noted.  Intact distal pulses  Neurologic: Awake, alert. Moves all extremities spontaneously.  Psychiatric: Affect normal, Judgment normal, Mood normal.     LABS  Results for orders placed or performed during the hospital encounter of 01/13/20   CBC WITH DIFFERENTIAL   Result Value Ref Range    WBC 9.1 4.8 - 10.8 K/uL    RBC 4.71 4.20 - 5.40 M/uL    Hemoglobin 14.4 12.0 - 16.0 g/dL    Hematocrit 44.4 37.0 - 47.0 %    MCV 94.3 81.4 - 97.8 fL    MCH 30.6 27.0 - 33.0 pg    MCHC 32.4 (L) 33.6 - 35.0 g/dL    RDW 49.4 35.9 - 50.0 fL    Platelet Count 289 164 - 446 K/uL    MPV 11.2 9.0 - 12.9 fL    Neutrophils-Polys 61.60 44.00 - 72.00 %    Lymphocytes 33.60 22.00 - 41.00 %    Monocytes 3.80 0.00 - 13.40 %    Eosinophils 0.40 0.00 - 6.90 %    Basophils 0.30 0.00 - 1.80 %    Immature Granulocytes 0.30 0.00 - 0.90 %    Nucleated RBC 0.00 /100 WBC    Neutrophils (Absolute) 5.61 2.00 - 7.15 K/uL    Lymphs (Absolute) 3.07 1.00 - 4.80 K/uL    Monos (Absolute) 0.35 0.00 - 0.85 K/uL    Eos (Absolute) 0.04 0.00 - 0.51 K/uL    Baso (Absolute) 0.03 0.00 - 0.12 K/uL    Immature Granulocytes (abs) 0.03 0.00 - 0.11 K/uL    NRBC (Absolute) 0.00 K/uL   COMP METABOLIC PANEL   Result Value Ref Range    Sodium 138 135 - 145 mmol/L    Potassium 3.5 (L) 3.6  - 5.5 mmol/L    Chloride 105 96 - 112 mmol/L    Co2 21 20 - 33 mmol/L    Anion Gap 12.0 (H) 0.0 - 11.9    Glucose 88 65 - 99 mg/dL    Bun 13 8 - 22 mg/dL    Creatinine 0.79 0.50 - 1.40 mg/dL    Calcium 8.8 8.5 - 10.5 mg/dL    AST(SGOT) 14 12 - 45 U/L    ALT(SGPT) 18 2 - 50 U/L    Alkaline Phosphatase 47 30 - 99 U/L    Total Bilirubin 0.3 0.1 - 1.5 mg/dL    Albumin 4.2 3.2 - 4.9 g/dL    Total Protein 7.2 6.0 - 8.2 g/dL    Globulin 3.0 1.9 - 3.5 g/dL    A-G Ratio 1.4 g/dL   LIPASE   Result Value Ref Range    Lipase <3 (L) 11 - 82 U/L   URINALYSIS,CULTURE IF INDICATED   Result Value Ref Range    Color Yellow     Character Cloudy (A)     Specific Gravity 1.040 <1.035    Ph 6.0 5.0 - 8.0    Glucose Negative Negative mg/dL    Ketones >=160 Negative mg/dL    Protein 100 (A) Negative mg/dL    Bilirubin Negative Negative    Urobilinogen, Urine 1.0 Negative    Nitrite Negative Negative    Leukocyte Esterase Negative Negative    Occult Blood Large (A) Negative    Micro Urine Req Microscopic    ESTIMATED GFR   Result Value Ref Range    GFR If African American >60 >60 mL/min/1.73 m 2    GFR If Non African American >60 >60 mL/min/1.73 m 2   URINE MICROSCOPIC (W/UA)   Result Value Ref Range    WBC 5-10 (A) /hpf    -150 (A) /hpf    Bacteria Few (A) None /hpf    Epithelial Cells Moderate (A) /hpf    Hyaline Cast 0-2 /lpf          RADIOLOGY  US-PELVIC COMPLETE (TRANSABDOMINAL/TRANSVAGINAL) (COMBO)   Final Result         Inhomogeneous uterus with ill-defined heterogeneous area in the posterior uterine fundus, similar to prior.      No adnexal mass identified.        The radiologist's interpretation of all radiological studies have been reviewed by me.      COURSE & MEDICAL DECISION MAKING  Pertinent Labs & Imaging studies reviewed. (See chart for details)    I reviewed the patient's medical records which showed endometriosis, previous visits for pelvic pain    4:00 PM - Patient seen and examined at bedside. Patient will be  treated with ondansetron 4 mg and hydromorphone 0.5 mg. Ordered US pelvic complete, urine microscopic with U/A, estimated GFR, CBC with differential, CMP, lipase, U/A culture if indicated to evaluate her symptoms. The differential diagnoses include but are not limited to: Endometriosis, urinary tract infection, free fluid in the pelvis.    Decision Making:  Patient is coming in with pelvic pain, history of endometriosis, she increased her hormones recently, she is on methadone for chronic pain, she cannot handle the pain at home.  This is atypical endometrial pain given the length of time.  Give the patient multiple doses of pain medicines, ultrasound and laboratory tests were unremarkable, discussed the case with Dr. Gross with OB/GYN who recommends is close outpatient follow-up.  I will give the patient a prescription for a few pain medicines for covering her for acute pain, she will contact her pain management doctor tomorrow, have the patient return with worsening symptoms.        I reviewed prescription monitoring program for patient's narcotic use before prescribing a scheduled drug.The patient will not drink alcohol nor drive with prescribed medications.} The patient will return for new or worsening symptoms and is stable at the time of discharge.    The patient is referred to a primary physician for blood pressure management, diabetic screening, and for all other preventative health concerns.    In prescribing controlled substances to this patient, I certify that I have obtained and reviewed the medical history of Lizz Storey. I have also made a good jaylon effort to obtain applicable records from other providers who have treated the patient and records did not demonstrate any increased risk of substance abuse that would prevent me from prescribing controlled substances.     I have conducted a physical exam and documented it. I have reviewed Ms. Storey’s prescription history as maintained by the  [place] Prescription Monitoring Program.     I have assessed the patient’s risk for abuse, dependency, and addiction using the validated Opioid Risk Tool available at https://www.mdcalc.com/dutnpn-yava-smrs-ort-narcotic-abuse.     Given the above, I believe the benefits of controlled substance therapy outweigh the risks. The reasons for prescribing controlled substances include non-narcotic, oral analgesic alternatives have been inadequate for pain control. Accordingly, I have discussed the risk and benefits, treatment plan, and alternative therapies with the patient.         DISPOSITION:  Patient will be discharged home in stable condition.    FOLLOW UP:  [facility], Emergency Dept  [address] [id number] [phone]    If symptoms worsen    Ed Bravo M.D.  [address]  [place] [address]  [phone]            OUTPATIENT MEDICATIONS:  Discharge Medication List as of 1/13/2020  6:03 PM      START taking these medications    Details   oxyCODONE immediate-release (ROXICODONE) 5 MG Tab Take 1 Tab by mouth every four hours as needed for Severe Pain for up to 1 day., Disp-5 Tab, R-0, Print Rx Paper               FINAL IMPRESSION  1. Pelvic pain    2. Endometriosis          I, Kiran Butts (Scribe), am scribing for, and in the presence of, Brian Duran M.D..    Electronically signed by: Kiran Butts (Scribe), 1/13/2020    IBrian M.D. personally performed the services described in this documentation, as scribed by Kiran Butts in my presence, and it is both accurate and complete.    C    The note accurately reflects work and decisions made by me.  Brian Duran M.D.  1/13/2020  6:25 PM

## 2020-01-13 NOTE — ED TRIAGE NOTES
"Chief Complaint   Patient presents with   • Vaginal Pain     pt states hx of endometriosis. pt endorses \"having period for approx 8 day.\" pt states 8/10 pain \"inside.\"  pt denies painful urination.    • Vaginal Bleeding       Pt ambulatory to triage for above complaint.    Pt is alert/oriented and follows commands. Pt speaking in full sentences and responds appropriately to questions. Pt appears in mild distress. Protocol initiated.     Pt placed in lobby and educated on triage process. Pt encouraged to alert staff for any changes in condition.    "

## 2020-02-16 ENCOUNTER — HOSPITAL ENCOUNTER (EMERGENCY)
Facility: MEDICAL CENTER | Age: 35
End: 2020-02-16
Attending: EMERGENCY MEDICINE
Payer: COMMERCIAL

## 2020-02-16 VITALS
SYSTOLIC BLOOD PRESSURE: 113 MMHG | HEART RATE: 56 BPM | RESPIRATION RATE: 18 BRPM | WEIGHT: 177.47 LBS | OXYGEN SATURATION: 97 % | TEMPERATURE: 97.9 F | BODY MASS INDEX: 31.45 KG/M2 | DIASTOLIC BLOOD PRESSURE: 70 MMHG | HEIGHT: 63 IN

## 2020-02-16 DIAGNOSIS — R10.2 CHRONIC PELVIC PAIN IN FEMALE: ICD-10-CM

## 2020-02-16 DIAGNOSIS — R10.2 PELVIC PAIN: ICD-10-CM

## 2020-02-16 DIAGNOSIS — G89.29 CHRONIC PELVIC PAIN IN FEMALE: ICD-10-CM

## 2020-02-16 DIAGNOSIS — N80.9 ENDOMETRIOSIS: ICD-10-CM

## 2020-02-16 PROCEDURE — 700111 HCHG RX REV CODE 636 W/ 250 OVERRIDE (IP): Performed by: EMERGENCY MEDICINE

## 2020-02-16 PROCEDURE — 96372 THER/PROPH/DIAG INJ SC/IM: CPT

## 2020-02-16 PROCEDURE — 99284 EMERGENCY DEPT VISIT MOD MDM: CPT

## 2020-02-16 RX ORDER — KETOROLAC TROMETHAMINE 30 MG/ML
30 INJECTION, SOLUTION INTRAMUSCULAR; INTRAVENOUS ONCE
Status: DISCONTINUED | OUTPATIENT
Start: 2020-02-16 | End: 2020-02-16

## 2020-02-16 RX ORDER — ONDANSETRON 4 MG/1
4 TABLET, ORALLY DISINTEGRATING ORAL ONCE
Status: COMPLETED | OUTPATIENT
Start: 2020-02-16 | End: 2020-02-16

## 2020-02-16 RX ORDER — OXYCODONE HYDROCHLORIDE AND ACETAMINOPHEN 5; 325 MG/1; MG/1
1-2 TABLET ORAL EVERY 4 HOURS PRN
Qty: 12 TAB | Refills: 0 | Status: SHIPPED | OUTPATIENT
Start: 2020-02-16 | End: 2020-02-21

## 2020-02-16 RX ORDER — HYDROMORPHONE HYDROCHLORIDE 1 MG/ML
1 INJECTION, SOLUTION INTRAMUSCULAR; INTRAVENOUS; SUBCUTANEOUS ONCE
Status: COMPLETED | OUTPATIENT
Start: 2020-02-16 | End: 2020-02-16

## 2020-02-16 RX ADMIN — ONDANSETRON 4 MG: 4 TABLET, ORALLY DISINTEGRATING ORAL at 06:33

## 2020-02-16 RX ADMIN — HYDROMORPHONE HYDROCHLORIDE 1 MG: 1 INJECTION, SOLUTION INTRAMUSCULAR; INTRAVENOUS; SUBCUTANEOUS at 06:34

## 2020-02-16 NOTE — ED TRIAGE NOTES
"Chief Complaint   Patient presents with   • Pelvic Pain     hx of endometriosis. recent med changes. pre op appt on Friday.    • Vaginal Bleeding     x5 days       Pt presents to ed for worsening endometriosis pain x1 month.  Pt has pre op appt on Friday.  Pt in obvious discomfort in triage.        Pt placed back in lobby.  Informed of triage process and wait times, thanked for patience.  Pt instructed to notify staff of any symptom changes.    /65   Pulse 74   Temp 36.6 °C (97.9 °F) (Temporal)   Resp 16   Ht 1.6 m (5' 3\")   Wt 80.5 kg (177 lb 7.5 oz)   SpO2 97%   BMI 31.44 kg/m²    "

## 2020-02-16 NOTE — ED NOTES
Discharge paperwork and prescription instruction provided. Pt verbalized understanding, then ambulated out to [facility], where spouse was waiting to provide a ride home. Pt with steady gait, alert and oriented.

## 2020-02-16 NOTE — ED PROVIDER NOTES
ED Provider Note    Scribed for Hema Escalante M.D. by Gibran Kapadia. 2/16/2020  5:33 AM    Primary care provider: KIRAN Yuen  Means of arrival: Walk-in  History obtained from: Patient  History limited by: None    CHIEF COMPLAINT  Chief Complaint   Patient presents with   • Pelvic Pain     hx of endometriosis. recent med changes. pre op appt on Friday.    • Vaginal Bleeding     x5 days        HPI  Lizz Storey is a 34 y.o. female who presents to the Emergency Department for recurrent, moderate pelvic pain and vaginal bleeding secondary to endometriosis onset 5 days ago. Patient states that she was here last month for similar symptoms. She was seen by her OB/GYN afterwards who discontinued some of her medications due to ineffectiveness. Patient was scheduled for pre op appointment this Friday. Denies any fevers or vomiting.    REVIEW OF SYSTEMS  Pertinent positives include Pelvic pain and vaginal bleeding.   Pertinent negatives include no fevers or vomiting.    All other systems reviewed and negative. See HPI for further details.     PAST MEDICAL HISTORY   has a past medical history of ASTHMA, Cyst of ovary, left, Endometriosis, Fibroid, uterus, Genital herpes in women, HPV in female, Kidney infection, and Psychiatric disorder.    SURGICAL HISTORY   has a past surgical history that includes jean by laparoscopy; laparoscopy; other abdominal surgery; and appendectomy.    SOCIAL HISTORY  Social History     Tobacco Use   • Smoking status: Former Smoker     Packs/day: 0.25     Years: 15.00     Pack years: 3.75   • Smokeless tobacco: Never Used   • Tobacco comment: 1.5 years   Substance Use Topics   • Alcohol use: No     Comment: 2 times per year   • Drug use: Yes     Types: Inhaled, Marijuana     Comment: marijuana 2 x per month      Social History     Substance and Sexual Activity   Drug Use Yes   • Types: Inhaled, Marijuana    Comment: marijuana 2 x per month       FAMILY HISTORY  No family  "history on file.    CURRENT MEDICATIONS  Current Outpatient Medications:   •  norethindrone (AYGESTIN) 5 MG tablet, Take 10 mg by mouth every day., Disp: , Rfl:   •  valacyclovir (VALTREX) 1 GM Tab, Take 1 Tab by mouth every day., Disp: 30 Tab, Rfl: 5  •  ibuprofen (MOTRIN) 800 MG Tab, Take 1 Tab by mouth every 8 hours as needed., Disp: 90 Tab, Rfl: 1  •  fluconazole (DIFLUCAN) 150 MG tablet, Take 1 Tab by mouth every day., Disp: 1 Tab, Rfl: 1  •  ipratropium-albuterol (DUONEB) 0.5-2.5 (3) MG/3ML nebulizer solution, 3 mL by Nebulization route every four hours as needed for Shortness of Breath., Disp: 30 Bullet, Rfl: 0  •  ondansetron (ZOFRAN ODT) 4 MG TABLET DISPERSIBLE, Take 1 Tab by mouth every 8 hours as needed for Nausea., Disp: 30 Tab, Rfl: 2  •  Elagolix Sodium (ORILISSA) 200 MG Tab, Take  by mouth., Disp: , Rfl:   •  albuterol 108 (90 Base) MCG/ACT Aero Soln inhalation aerosol, Inhale 2 Puffs by mouth every four hours as needed., Disp: 1 Inhaler, Rfl: 0  •  promethazine (PHENERGAN) 25 MG Tab, Take 1 Tab by mouth every 6 hours as needed for Nausea/Vomiting., Disp: 30 Tab, Rfl: 0  •  budesonide-formoterol (SYMBICORT) 80-4.5 MCG/ACT Aerosol, Inhale 2 Puffs by mouth 2 Times a Day., Disp: 1 Inhaler, Rfl: 5  •  acyclovir (ZOVIRAX) 400 MG tablet, Take 400 mg by mouth every day., Disp: , Rfl:     ALLERGIES  Allergies   Allergen Reactions   • Carrot [Daucus Carota] Anaphylaxis     Only raw carrot; cooked carrot ok.   • Ciprofloxacin Hives   • Keflex      Mild hand swelling   • Ketorolac Tromethamine Rash   • Morphine Hives   • Penicillins Rash       PHYSICAL EXAM  VITAL SIGNS: /65   Pulse 74   Temp 36.6 °C (97.9 °F) (Temporal)   Resp 16   Ht 1.6 m (5' 3\")   Wt 80.5 kg (177 lb 7.5 oz)   SpO2 97%   BMI 31.44 kg/m²     Nursing note and vitals reviewed.  Constitutional: Uncomfortable appearing. Well-developed and well-nourished.   HENT: Head is normocephalic and atraumatic. Oropharynx is clear and moist " without exudate or erythema.   Eyes: Pupils are equal, round, and reactive to light. Conjunctiva are normal.   Cardiovascular: Normal rate and regular rhythm. No murmur heard. Normal radial pulses.  Pulmonary/Chest: Breath sounds normal. No wheezes or rales.   Abdominal: Unable to elicit any abdominal tenderness. Soft. No distention    Musculoskeletal: Extremities exhibit normal range of motion without edema or tenderness.   Neurological: Awake, alert and oriented to person, place, and time. No focal deficits noted.  Skin: Skin is warm and dry. No rash.   Psychiatric: Normal mood and affect. Appropriate for clinical situation.      COURSE & MEDICAL DECISION MAKING  Nursing notes, VS, PMSFHx reviewed in chart.     Review of past medical records shows the patient was seen here 1 month ago for similar symptoms.     5:33 AM - Patient seen and examined at bedside. Discussed plan for pain control and then discharge, patient was agreeable to plan of care. Patient will be treated with Dilaudid 1 mg, Zofran ODT 4 mg, and Toradol 30 mg.         I reviewed prescription monitoring program for patient's narcotic use before prescribing a scheduled drug.The patient will not drink alcohol nor drive with prescribed medications. The patient will return for new or worsening symptoms and is stable at the time of discharge.    The patient is referred to a primary physician for blood pressure management, diabetic screening, and for all other preventative health concerns.    In prescribing controlled substances to this patient, I certify that I have obtained and reviewed the medical history of Lizz Storey. I have also made a good jaylon effort to obtain applicable records from other providers who have treated the patient and no other records are available at this time.     I have conducted a physical exam and documented it. I have reviewed Ms. Storey’s prescription history as maintained by the [place] Prescription Monitoring  Program.     I have assessed the patient’s risk for abuse, dependency, and addiction using the validated Opioid Risk Tool available at https://www.mdcalc.com/kqzsuq-nbbz-myxp-ort-narcotic-abuse.     Given the above, I believe the benefits of controlled substance therapy outweigh the risks. The reasons for prescribing controlled substances include non-narcotic, oral analgesic alternatives have been inadequate for pain control. Accordingly, I have discussed the risk and benefits, treatment plan, and alternative therapies with the patient.       DISPOSITION:  Patient will be discharged home in stable condition.    FOLLOW UP:  No follow-up provider specified.    OUTPATIENT MEDICATIONS:  New Prescriptions    OXYCODONE-ACETAMINOPHEN (PERCOCET) 5-325 MG TAB    Take 1-2 Tabs by mouth every four hours as needed for up to 5 days.       FINAL IMPRESSION  1. Pelvic pain    2. Endometriosis    3. Chronic pelvic pain in female          Gibran GUIDO (Rebecca), am scribing for, and in the presence of, Hema Escalante M.D..    Electronically signed by: Gibran Kapadia (Rebecca), 2/16/2020    Hema GUIDO M.D. personally performed the services described in this documentation, as scribed by Gibran Kapadia in my presence, and it is both accurate and complete. C.    The note accurately reflects work and decisions made by me.  Hema Escalante M.D.  2/16/2020  11:06 AM

## 2020-02-18 ENCOUNTER — PATIENT MESSAGE (OUTPATIENT)
Dept: MEDICAL GROUP | Facility: MEDICAL CENTER | Age: 35
End: 2020-02-18

## 2020-02-18 NOTE — TELEPHONE ENCOUNTER
From: Lizz Storey  To: KIRAN Yuen  Sent: 2/18/2020 9:23 AM PST  Subject: Non-Urgent Medical Question    morning dr weaver... im in need of a drs note for the day today, im in so much pain and my office needs one.. i went to er on sunday thinking it was another endo flare up but i dont know if it is really... im going to try and make an appointment with you for this week but if i could get a note for my absence that would be fantastic.

## 2020-02-18 NOTE — LETTER
February 18, 2020        RE: Lizz Storey    To Whom It May Concern;    Lizz Storye is seen in my office for primary care.  Please excuse her work absence 2/18/2020 due to illness.    Please contact me with any questions.      Sincerely,                KRYSTYNA Yuen.    Electronically signed

## 2020-03-23 DIAGNOSIS — R11.2 NON-INTRACTABLE VOMITING WITH NAUSEA, UNSPECIFIED VOMITING TYPE: ICD-10-CM

## 2020-03-23 RX ORDER — PROMETHAZINE HYDROCHLORIDE 25 MG/1
25 TABLET ORAL EVERY 6 HOURS PRN
Qty: 30 TAB | Refills: 1 | Status: SHIPPED | OUTPATIENT
Start: 2020-03-23 | End: 2020-06-17 | Stop reason: SDUPTHER

## 2020-03-23 RX ORDER — ONDANSETRON 4 MG/1
4 TABLET, ORALLY DISINTEGRATING ORAL EVERY 8 HOURS PRN
Qty: 30 TAB | Refills: 1 | Status: SHIPPED | OUTPATIENT
Start: 2020-03-23 | End: 2020-06-17 | Stop reason: SDUPTHER

## 2020-04-29 ENCOUNTER — APPOINTMENT (OUTPATIENT)
Dept: MEDICAL GROUP | Facility: MEDICAL CENTER | Age: 35
End: 2020-04-29
Payer: COMMERCIAL

## 2020-05-06 ENCOUNTER — TELEPHONE (OUTPATIENT)
Dept: MEDICAL GROUP | Facility: MEDICAL CENTER | Age: 35
End: 2020-05-06

## 2020-05-06 NOTE — TELEPHONE ENCOUNTER
Called patient at 3:26pm on 5/6/20 to discuss her no shows. Patient has no showed three times in the last year on the following dates:    2/20/20 5/1/20 5/6/20    Patient did not answer. I left message for call back and also have sent letter to patient via mail reminding her of the no show procedure.

## 2020-05-27 ENCOUNTER — OFFICE VISIT (OUTPATIENT)
Dept: MEDICAL GROUP | Facility: MEDICAL CENTER | Age: 35
End: 2020-05-27
Payer: COMMERCIAL

## 2020-05-27 VITALS
OXYGEN SATURATION: 100 % | TEMPERATURE: 98.3 F | HEART RATE: 63 BPM | SYSTOLIC BLOOD PRESSURE: 122 MMHG | RESPIRATION RATE: 18 BRPM | BODY MASS INDEX: 29.84 KG/M2 | DIASTOLIC BLOOD PRESSURE: 70 MMHG | HEIGHT: 63 IN | WEIGHT: 168.43 LBS

## 2020-05-27 DIAGNOSIS — J45.20 MILD INTERMITTENT ASTHMA IN ADULT WITHOUT COMPLICATION: ICD-10-CM

## 2020-05-27 DIAGNOSIS — Z00.00 ANNUAL PHYSICAL EXAM: ICD-10-CM

## 2020-05-27 DIAGNOSIS — N80.9 ENDOMETRIOSIS: ICD-10-CM

## 2020-05-27 PROBLEM — E66.9 OBESITY (BMI 30-39.9): Status: RESOLVED | Noted: 2018-05-09 | Resolved: 2020-05-27

## 2020-05-27 PROCEDURE — 99395 PREV VISIT EST AGE 18-39: CPT | Performed by: NURSE PRACTITIONER

## 2020-05-27 RX ORDER — BUDESONIDE AND FORMOTEROL FUMARATE DIHYDRATE 80; 4.5 UG/1; UG/1
2 AEROSOL RESPIRATORY (INHALATION) 2 TIMES DAILY
Qty: 1 INHALER | Refills: 5 | Status: SHIPPED | OUTPATIENT
Start: 2020-05-27 | End: 2023-08-19

## 2020-05-27 RX ORDER — ALBUTEROL SULFATE 90 UG/1
2 AEROSOL, METERED RESPIRATORY (INHALATION) EVERY 4 HOURS PRN
Qty: 1 INHALER | Refills: 0 | Status: SHIPPED | OUTPATIENT
Start: 2020-05-27

## 2020-05-27 ASSESSMENT — FIBROSIS 4 INDEX: FIB4 SCORE: 0.41

## 2020-05-27 NOTE — PROGRESS NOTES
Chief Complaint   Patient presents with   • Annual Exam   • Letter for School/Work     yearly     Lizz Storey is a 35 y.o. female here for well exam and needing paperwork completed for her employer.  We discussed:    Endometriosis  Now back on lupron which is helping.     Asthma in adult without complication  She states that she has been using albuterol on a daily basis, she has discontinued Symbicort.  She denies any current shortness of breath or frequent cough, typically has more difficulty in the summertime when there fires or smoke in the air    She has been exercising regularly and watching her diet, has been able to lose about 15 pounds recently.    Current medicines (including changes today)  Current Outpatient Medications   Medication Sig Dispense Refill   • budesonide-formoterol (SYMBICORT) 80-4.5 MCG/ACT Aerosol Inhale 2 Puffs by mouth 2 Times a Day. 1 Inhaler 5   • albuterol 108 (90 Base) MCG/ACT Aero Soln inhalation aerosol Inhale 2 Puffs by mouth every four hours as needed. 1 Inhaler 0   • promethazine (PHENERGAN) 25 MG Tab Take 1 Tab by mouth every 6 hours as needed for Nausea/Vomiting. 30 Tab 1   • ondansetron (ZOFRAN ODT) 4 MG TABLET DISPERSIBLE Take 1 Tab by mouth every 8 hours as needed for Nausea. 30 Tab 1   • norethindrone (AYGESTIN) 5 MG tablet Take 10 mg by mouth every day.     • valacyclovir (VALTREX) 1 GM Tab Take 1 Tab by mouth every day. 30 Tab 5   • ibuprofen (MOTRIN) 800 MG Tab Take 1 Tab by mouth every 8 hours as needed. 90 Tab 1   • ipratropium-albuterol (DUONEB) 0.5-2.5 (3) MG/3ML nebulizer solution 3 mL by Nebulization route every four hours as needed for Shortness of Breath. 30 Bullet 0   • acyclovir (ZOVIRAX) 400 MG tablet Take 400 mg by mouth every day.     • fluconazole (DIFLUCAN) 150 MG tablet Take 1 Tab by mouth every day. (Patient not taking: Reported on 5/27/2020) 1 Tab 1     No current facility-administered medications for this visit.      She  has a past medical  "history of ASTHMA, Cyst of ovary, left, Endometriosis, Fibroid, uterus, Genital herpes in women, HPV in female, Kidney infection, and Psychiatric disorder. She also has no past medical history of CAD (coronary artery disease), Cancer (HCC), Congestive heart failure (HCC), COPD, Diabetes, Hypertension, Liver disease, Seizure disorder (HCC), or Stroke (HCC).  She  has a past surgical history that includes jean by laparoscopy; laparoscopy; other abdominal surgery; and appendectomy.  Social History     Tobacco Use   • Smoking status: Former Smoker     Packs/day: 0.25     Years: 15.00     Pack years: 3.75   • Smokeless tobacco: Never Used   • Tobacco comment: 1.5 years   Substance Use Topics   • Alcohol use: No     Comment: 2 times per year   • Drug use: Yes     Types: Inhaled, Marijuana     Comment: marijuana 2 x per month     Social History     Social History Narrative   • Not on file     No family history on file.  No family status information on file.         ROS  Problems listed discussed above, all other systems reviewed and negative     Objective:     /70 (BP Location: Left arm, Patient Position: Sitting, BP Cuff Size: Adult)   Pulse 63   Temp 36.8 °C (98.3 °F) (Temporal)   Resp 18   Ht 1.6 m (5' 3\")   Wt 76.4 kg (168 lb 6.9 oz)   SpO2 100%  Body mass index is 29.84 kg/m².  Physical Exam:  General: Alert, oriented in no acute distress.  Eye contact is good, speech is normal, affect calm  HEENT:  Oral mucosa pink moist, no lesions. Nares patent. TMs gray with good landmarks bilaterally. No cervical or supraclavicular lymphadenopathy, thyroid isthmus palpable without masses or nodules.  Lungs: clear to auscultation bilaterally, good aeration, normal effort. No wheeze/ rhonchi/ rales.  CV: regular rate and rhythm, S1, S2. No murmur, no JVD, no edema. PMI at 5th intercostal space midclavicular line. Pedal pulses 2 + bilaterally  Abdomen: soft, nontender, BS x4, no hepatosplenomegaly.  Ext: color normal, " vascularity normal, temperature normal. No rash or lesions.    Assessment and Plan:   The following treatment plan was discussed  1. Annual physical exam  Normal physical exam. General health and wellness discussion including healthy diet, regular exercise. 2000 iu Vit d3 advised daily. Advised regular dental cleanings, eye exam yearly.     2. Endometriosis   stable at this time on Lupron, followed by OB/GYN   3. Mild intermittent asthma in adult without complication   patient reports frequent use of albuterol.  We have discussed that if she is using albuterol more than twice a week or so her asthma is not considered controlled.  Recommend restart of Symbicort.  Follow-up if still needing rescue inhaler on a regular basis  budesonide-formoterol (SYMBICORT) 80-4.5 MCG/ACT Aerosol    albuterol 108 (90 Base) MCG/ACT Aero Soln inhalation aerosol     Followup: Annually, sooner as needed             Please note that this dictation was created using voice recognition software. I have worked with consultants from the vendor as well as technical experts from [facility] [facility] [employer] to optimize the interface. I have made every reasonable attempt to correct obvious errors, but I expect that there are errors of grammar and possibly content that I did not discover before finalizing the note.

## 2020-05-27 NOTE — ASSESSMENT & PLAN NOTE
She states that she has been using albuterol on a daily basis, she has discontinued Symbicort.  She denies any current shortness of breath or frequent cough, typically has more difficulty in the summertime when there fires or smoke in the air

## 2020-06-17 DIAGNOSIS — R11.2 NON-INTRACTABLE VOMITING WITH NAUSEA, UNSPECIFIED VOMITING TYPE: ICD-10-CM

## 2020-06-18 RX ORDER — PROMETHAZINE HYDROCHLORIDE 25 MG/1
25 TABLET ORAL EVERY 6 HOURS PRN
Qty: 30 TAB | Refills: 1 | Status: SHIPPED | OUTPATIENT
Start: 2020-06-18 | End: 2020-11-17 | Stop reason: SDUPTHER

## 2020-06-18 RX ORDER — ONDANSETRON 4 MG/1
4 TABLET, ORALLY DISINTEGRATING ORAL EVERY 8 HOURS PRN
Qty: 30 TAB | Refills: 1 | Status: SHIPPED | OUTPATIENT
Start: 2020-06-18 | End: 2020-11-17 | Stop reason: SDUPTHER

## 2020-06-18 RX ORDER — IBUPROFEN 800 MG/1
800 TABLET ORAL EVERY 8 HOURS PRN
Qty: 90 TAB | Refills: 1 | Status: SHIPPED | OUTPATIENT
Start: 2020-06-18 | End: 2023-08-19

## 2020-07-04 ENCOUNTER — APPOINTMENT (OUTPATIENT)
Dept: RADIOLOGY | Facility: MEDICAL CENTER | Age: 35
End: 2020-07-04
Attending: EMERGENCY MEDICINE
Payer: COMMERCIAL

## 2020-07-04 ENCOUNTER — HOSPITAL ENCOUNTER (EMERGENCY)
Facility: MEDICAL CENTER | Age: 35
End: 2020-07-04
Attending: EMERGENCY MEDICINE | Admitting: EMERGENCY MEDICINE
Payer: COMMERCIAL

## 2020-07-04 VITALS
OXYGEN SATURATION: 93 % | TEMPERATURE: 98.8 F | WEIGHT: 161.38 LBS | DIASTOLIC BLOOD PRESSURE: 68 MMHG | BODY MASS INDEX: 28.59 KG/M2 | SYSTOLIC BLOOD PRESSURE: 102 MMHG | HEART RATE: 62 BPM | HEIGHT: 63 IN | RESPIRATION RATE: 18 BRPM

## 2020-07-04 DIAGNOSIS — N80.9 ENDOMETRIOSIS: ICD-10-CM

## 2020-07-04 DIAGNOSIS — R10.2 PELVIC PAIN: ICD-10-CM

## 2020-07-04 LAB
APPEARANCE UR: ABNORMAL
BACTERIA #/AREA URNS HPF: ABNORMAL /HPF
BILIRUB UR QL STRIP.AUTO: NEGATIVE
COLOR UR: YELLOW
EPI CELLS #/AREA URNS HPF: ABNORMAL /HPF
GLUCOSE UR STRIP.AUTO-MCNC: NEGATIVE MG/DL
HCG UR QL: NEGATIVE
KETONES UR STRIP.AUTO-MCNC: NEGATIVE MG/DL
LEUKOCYTE ESTERASE UR QL STRIP.AUTO: NEGATIVE
MICRO URNS: ABNORMAL
NITRITE UR QL STRIP.AUTO: NEGATIVE
PH UR STRIP.AUTO: 5 [PH] (ref 5–8)
PROT UR QL STRIP: NEGATIVE MG/DL
RBC # URNS HPF: ABNORMAL /HPF
RBC UR QL AUTO: NEGATIVE
SP GR UR STRIP.AUTO: 1.02
UROBILINOGEN UR STRIP.AUTO-MCNC: 0.2 MG/DL
WBC #/AREA URNS HPF: ABNORMAL /HPF

## 2020-07-04 PROCEDURE — 99284 EMERGENCY DEPT VISIT MOD MDM: CPT

## 2020-07-04 PROCEDURE — 81001 URINALYSIS AUTO W/SCOPE: CPT

## 2020-07-04 PROCEDURE — 76856 US EXAM PELVIC COMPLETE: CPT

## 2020-07-04 PROCEDURE — 96374 THER/PROPH/DIAG INJ IV PUSH: CPT

## 2020-07-04 PROCEDURE — 700111 HCHG RX REV CODE 636 W/ 250 OVERRIDE (IP): Performed by: EMERGENCY MEDICINE

## 2020-07-04 PROCEDURE — 81025 URINE PREGNANCY TEST: CPT

## 2020-07-04 PROCEDURE — 96376 TX/PRO/DX INJ SAME DRUG ADON: CPT

## 2020-07-04 RX ORDER — OXYCODONE HYDROCHLORIDE AND ACETAMINOPHEN 5; 325 MG/1; MG/1
1 TABLET ORAL EVERY 8 HOURS PRN
Qty: 12 TAB | Refills: 0 | Status: SHIPPED | OUTPATIENT
Start: 2020-07-04 | End: 2020-07-18

## 2020-07-04 RX ORDER — HYDROMORPHONE HYDROCHLORIDE 1 MG/ML
1 INJECTION, SOLUTION INTRAMUSCULAR; INTRAVENOUS; SUBCUTANEOUS ONCE
Status: COMPLETED | OUTPATIENT
Start: 2020-07-04 | End: 2020-07-04

## 2020-07-04 RX ADMIN — HYDROMORPHONE HYDROCHLORIDE 1 MG: 1 INJECTION, SOLUTION INTRAMUSCULAR; INTRAVENOUS; SUBCUTANEOUS at 04:39

## 2020-07-04 RX ADMIN — HYDROMORPHONE HYDROCHLORIDE 1 MG: 1 INJECTION, SOLUTION INTRAMUSCULAR; INTRAVENOUS; SUBCUTANEOUS at 05:58

## 2020-07-04 ASSESSMENT — ENCOUNTER SYMPTOMS
VOMITING: 0
FEVER: 0
ABDOMINAL PAIN: 1
SHORTNESS OF BREATH: 0
NAUSEA: 0

## 2020-07-04 ASSESSMENT — FIBROSIS 4 INDEX: FIB4 SCORE: 0.41

## 2020-07-04 NOTE — ED TRIAGE NOTES
"Chief Complaint   Patient presents with   • Pelvic Pain     36 yo female ambulatory to triage for above complaint. Pt reports 7/10 sharp/stabbing L sided pelvic pain since 0800 yesterday, denies pregnancy or painful urination, states hx of ovarian cysts, endometriosis, and fibroids.    Educated on triage process, encourage to inform staff of any changes.     /81   Pulse 97   Temp 36.6 °C (97.8 °F) (Temporal)   Resp 16   Ht 1.6 m (5' 3\")   Wt 73.2 kg (161 lb 6 oz)   SpO2 93%   BMI 28.59 kg/m²   "

## 2020-07-04 NOTE — ED PROVIDER NOTES
ED Provider Note    ED Provider Note    Primary care provider: KIRAN Yuen  Means of arrival: POV  History obtained from: patient  History limited by: None    CHIEF COMPLAINT  Chief Complaint   Patient presents with   • Pelvic Pain       HPI  Lizz Storey is a 35 y.o. female who presents to the Emergency Department with a chief complaint of an exacerbation of her chronic pain.  Patient has a history of endometriosis uterine fibroids and ovarian cysts.  Occasionally it flares up.  She started having pain yesterday morning at about 8 AM.  She reports an inability to sleep.  Due to the holiday weekend, she was unable to get in contact with her primary care doctors and her OB/GYN, will help her manage her pain.  She was started on Lupron at the beginning of this year which was helpful in managing her pain and then she had and, with her OB/GYN, for a hysterectomy.  Ever, we were struck with the COVID-19 pandemic and this was put off.  She currently, is not on any opioid pain medication for maintenance.  She had been seen at the pain management clinic but this is no longer.  Previously, Lupron had been managing her symptoms.  She denies any urinary symptoms.  She states she has pain in her typical places, her left lower quadrant and her suprapubic area.  She has no vaginal pain.  She denies being sexually active.  She denies vaginal discharge, odor or itching.  It feels typical of her chronic symptoms.  No chest pain or shortness of breath.  No cough or cold symptoms.    REVIEW OF SYSTEMS  Review of Systems   Constitutional: Negative for fever.   HENT: Negative for congestion.    Respiratory: Negative for shortness of breath.    Cardiovascular: Negative for chest pain.   Gastrointestinal: Positive for abdominal pain. Negative for nausea and vomiting.   Genitourinary: Negative for dysuria.   All other systems reviewed and are negative.      PAST MEDICAL HISTORY   has a past medical history of ASTHMA,  "Cyst of ovary, left, Endometriosis, Fibroid, uterus, Genital herpes in women, HPV in female, Kidney infection, and Psychiatric disorder.    SURGICAL HISTORY   has a past surgical history that includes jean by laparoscopy; laparoscopy; other abdominal surgery; and appendectomy.    SOCIAL HISTORY  Social History     Tobacco Use   • Smoking status: Former Smoker     Packs/day: 0.25     Years: 15.00     Pack years: 3.75   • Smokeless tobacco: Never Used   • Tobacco comment: 1.5 years   Substance Use Topics   • Alcohol use: No     Comment: 2 times per year   • Drug use: Yes     Types: Inhaled, Marijuana     Comment: marijuana 2 x per month      Social History     Substance and Sexual Activity   Drug Use Yes   • Types: Inhaled, Marijuana    Comment: marijuana 2 x per month       FAMILY HISTORY  No family history on file.    CURRENT MEDICATIONS  Home Medications    **Home medications have not yet been reviewed for this encounter**         ALLERGIES  Allergies   Allergen Reactions   • Carrot [Daucus Carota] Anaphylaxis     Only raw carrot; cooked carrot ok.   • Ciprofloxacin Hives   • Keflex      Mild hand swelling   • Ketorolac Tromethamine Rash   • Morphine Hives   • Penicillins Rash       PHYSICAL EXAM  VITAL SIGNS: /68   Pulse 62   Temp 37.1 °C (98.8 °F)   Resp 18   Ht 1.6 m (5' 3\")   Wt 73.2 kg (161 lb 6 oz)   SpO2 93%   BMI 28.59 kg/m²   Vitals reviewed.  Constitutional: Patient is oriented to person, place, and time. Appears well-developed and well-nourished. Mild distress.  Tearful.  Head: Normocephalic and atraumatic.   Ears: Normal external ears bilaterally.   Mouth/Throat: Oropharynx is clear  Eyes: Conjunctivae are normal.   Neck: Normal range of motion.   Cardiovascular: Normal rate, regular rhythm and normal heart sounds.   Pulmonary/Chest: Effort normal and breath sounds normal. No respiratory distress, no wheezes, rhonchi, or rales.  Abdominal: Soft. Bowel sounds are normal. There is LLQ and " suprpubic tenderness. No rebound or guarding, or peritoneal signs. No CVA tenderness.  Musculoskeletal: No edema   Neurological: No focal deficits.   Skin: Skin is warm and dry. No erythema. No pallor.   Psychiatric: Patient has a normal mood and affect.     LABS  Results for orders placed or performed during the hospital encounter of 07/04/20   URINALYSIS,CULTURE IF INDICATED    Specimen: Urine, Clean Catch   Result Value Ref Range    Color Yellow     Character Hazy (A)     Specific Gravity 1.022 <1.035    Ph 5.0 5.0 - 8.0    Glucose Negative Negative mg/dL    Ketones Negative Negative mg/dL    Protein Negative Negative mg/dL    Bilirubin Negative Negative    Urobilinogen, Urine 0.2 Negative    Nitrite Negative Negative    Leukocyte Esterase Negative Negative    Occult Blood Negative Negative    Micro Urine Req Microscopic    BETA-HCG QUALITATIVE URINE   Result Value Ref Range    Beta-Hcg Urine Negative Negative   URINE MICROSCOPIC (W/UA)   Result Value Ref Range    WBC 0-2 /hpf    RBC 0-2 /hpf    Bacteria Rare (A) None /hpf    Epithelial Cells Rare /hpf       All labs reviewed by me.    RADIOLOGY  US-PELVIC COMPLETE (TRANSABDOMINAL/TRANSVAGINAL) (COMBO)   Final Result      1.  Possible poorly defined leiomyoma in the posterior uterine fundus with no change from prior exam.      2.  Small cystic area in the posterior myometrium is noted which could indicate adenomyosis.      3.  Small nabothian cyst noted in the cervix.        The radiologist's interpretation of all radiological studies have been reviewed by me.    COURSE & MEDICAL DECISION MAKING  Pertinent Labs & Imaging studies reviewed. (See chart for details)    Obtained and reviewed past medical records. Jan and Feb 2020 as well as July ans sept 2019 ED pelvic pain h/o endo met and chronic ain    3:48 AM - Patient seen and examined at bedside.  This is a pleasant 35-year-old female, who unfortunately suffers with chronic, intermittent pelvic pain.  Recently  experiencing an exacerbation.  Pain unmanaged at home.  Recently, has been on Lupron as well as Motrin.  Pain started about 24 hours ago.  She denies fever, nausea or urinary symptoms.  She is status post appendectomy and cholecystectomy.  Per her history, this seems to be an acute exacerbation of her chronic pain.  Will obtain an ultrasound to further evaluate her symptoms as well as urine analysis.  She will be treated with Dilaudid.    0530AM is reevaluated bedside.  She is reporting improvement but still significant pain.  We discussed urine analysis and pregnancy testing both of which were negative.  Ultrasound shows no acute findings.  There is evidence of fibroid.  Will provide additional pain medication and reevaluate.    0630AM patient's reevaluated bedside.  She reports marked improvement.  At this point, I feel she can safely be discharged home.   checked.  There is no concerning prescription filling pattern.  Discharged home with a short course of oxycodone which she has benefited from in the past.  She is advised to follow-up with Dr. Maya OB/GYN.  She is in stable condition.  She will be discharged to home.      FINAL IMPRESSION  1. Pelvic pain    2. Endometriosis

## 2020-07-31 RX ORDER — IPRATROPIUM BROMIDE AND ALBUTEROL SULFATE 2.5; .5 MG/3ML; MG/3ML
SOLUTION RESPIRATORY (INHALATION)
Qty: 90 ML | Refills: 5 | Status: SHIPPED | OUTPATIENT
Start: 2020-07-31 | End: 2023-08-19

## 2020-07-31 NOTE — TELEPHONE ENCOUNTER
Received request via: Pharmacy    Was the patient seen in the last year in this department? Yes    Does the patient have an active prescription (recently filled or refills available) for medication(s) requested? No       Requested Prescriptions     Pending Prescriptions Disp Refills   • ipratropium-albuterol (DUONEB) 0.5-2.5 (3) MG/3ML nebulizer solution [Pharmacy Med Name: Ipratropium-Albuterol 0.5-2.5 (3) MG/3ML Inhalation Solution] 90 mL 0     Sig: USE 1 AMPULE IN NEBULIZER EVERY 4 HOURS AS NEEDED FOR SHORTNESS OF BREATH

## 2020-09-03 ENCOUNTER — PATIENT MESSAGE (OUTPATIENT)
Dept: MEDICAL GROUP | Facility: MEDICAL CENTER | Age: 35
End: 2020-09-03

## 2020-09-04 ENCOUNTER — PATIENT MESSAGE (OUTPATIENT)
Dept: MEDICAL GROUP | Facility: MEDICAL CENTER | Age: 35
End: 2020-09-04

## 2020-09-04 RX ORDER — TRAZODONE HYDROCHLORIDE 50 MG/1
50 TABLET ORAL NIGHTLY PRN
Qty: 30 TAB | Refills: 3 | Status: SHIPPED | OUTPATIENT
Start: 2020-09-04 | End: 2021-01-15

## 2020-09-04 NOTE — TELEPHONE ENCOUNTER
From: Lizz Storey  To: KIRAN Yuen  Sent: 9/4/2020 8:55 AM PDT  Subject: Non-Urgent Medical Question    i dont think i have been stressed.. i just cant shut off my brain..i waking up thinking!! it sucks..i have taking trazadone before jorge luis belived it worked~!! its a NON narcotic correct?!! thats the route id prefer of course! but i am will ing to try it again!! how long till you think it REALLY works , 2 weeks in system?.. thak you for getting back to me! i hope you have a GREAT holiday weekend!!      ----- Message -----   From:KIRAN Yuen   Sent:9/3/2020 3:37 PM PDT   To:Lizz Storey   Subject:RE: Non-Urgent Medical Question    Samuel Zamudio,  Sorry to hear that you are having a hard time with sleep. Are you more stressed than usual?  Be sure that you are getting regular exercise, avoid TV screens or your phone for at least an hour before you go to sleep. No caffeine in the afternoon, cold and dark sleeping environment, etc.  I can also send in a low dose of trazodone which works really well for most people. If you taking this before?  Sharon LUIS      ----- Message -----   From:Lizz Storey   Sent:9/3/2020 10:40 AM PDT   To:KIRAN Yuen   Subject:Non-Urgent Medical Question    Good Morning!! july been struggling to ask for help on this because i thought i could handle it with over the counter meds.. i have a VERY hard time sleeping and my brain wont shut off! july tried sleep aids, melatonin, and zz quil but i only sleep 4-5 hours a day .. is there anything you can call in or recommend?... it really starting to agrivate me! and i have NEVER had any problem sleeping in the past...     i hope you have a good day!!!     thank you for your time!

## 2020-11-17 DIAGNOSIS — R11.2 NON-INTRACTABLE VOMITING WITH NAUSEA, UNSPECIFIED VOMITING TYPE: ICD-10-CM

## 2020-11-17 RX ORDER — ONDANSETRON 4 MG/1
4 TABLET, ORALLY DISINTEGRATING ORAL EVERY 8 HOURS PRN
Qty: 30 TAB | Refills: 1 | Status: SHIPPED | OUTPATIENT
Start: 2020-11-17 | End: 2023-08-19

## 2020-11-17 RX ORDER — PROMETHAZINE HYDROCHLORIDE 25 MG/1
25 TABLET ORAL EVERY 6 HOURS PRN
Qty: 30 TAB | Refills: 1 | Status: SHIPPED | OUTPATIENT
Start: 2020-11-17 | End: 2023-08-19

## 2021-01-22 ENCOUNTER — HOSPITAL ENCOUNTER (EMERGENCY)
Facility: MEDICAL CENTER | Age: 36
End: 2021-01-23
Attending: EMERGENCY MEDICINE
Payer: COMMERCIAL

## 2021-01-22 DIAGNOSIS — R10.2 PELVIC PAIN: ICD-10-CM

## 2021-01-22 LAB
BASOPHILS # BLD AUTO: 0.3 % (ref 0–1.8)
BASOPHILS # BLD: 0.03 K/UL (ref 0–0.12)
EOSINOPHIL # BLD AUTO: 0.18 K/UL (ref 0–0.51)
EOSINOPHIL NFR BLD: 1.7 % (ref 0–6.9)
ERYTHROCYTE [DISTWIDTH] IN BLOOD BY AUTOMATED COUNT: 47.4 FL (ref 35.9–50)
HCT VFR BLD AUTO: 44.3 % (ref 37–47)
HGB BLD-MCNC: 14.7 G/DL (ref 12–16)
IMM GRANULOCYTES # BLD AUTO: 0.03 K/UL (ref 0–0.11)
IMM GRANULOCYTES NFR BLD AUTO: 0.3 % (ref 0–0.9)
LYMPHOCYTES # BLD AUTO: 2.16 K/UL (ref 1–4.8)
LYMPHOCYTES NFR BLD: 20.9 % (ref 22–41)
MCH RBC QN AUTO: 30.2 PG (ref 27–33)
MCHC RBC AUTO-ENTMCNC: 33.2 G/DL (ref 33.6–35)
MCV RBC AUTO: 91.2 FL (ref 81.4–97.8)
MONOCYTES # BLD AUTO: 0.46 K/UL (ref 0–0.85)
MONOCYTES NFR BLD AUTO: 4.5 % (ref 0–13.4)
NEUTROPHILS # BLD AUTO: 7.47 K/UL (ref 2–7.15)
NEUTROPHILS NFR BLD: 72.3 % (ref 44–72)
NRBC # BLD AUTO: 0 K/UL
NRBC BLD-RTO: 0 /100 WBC
PLATELET # BLD AUTO: 315 K/UL (ref 164–446)
PMV BLD AUTO: 11.4 FL (ref 9–12.9)
RBC # BLD AUTO: 4.86 M/UL (ref 4.2–5.4)
WBC # BLD AUTO: 10.3 K/UL (ref 4.8–10.8)

## 2021-01-22 PROCEDURE — 81001 URINALYSIS AUTO W/SCOPE: CPT

## 2021-01-22 PROCEDURE — 84703 CHORIONIC GONADOTROPIN ASSAY: CPT

## 2021-01-22 PROCEDURE — 99285 EMERGENCY DEPT VISIT HI MDM: CPT

## 2021-01-22 PROCEDURE — 80053 COMPREHEN METABOLIC PANEL: CPT

## 2021-01-22 PROCEDURE — 85025 COMPLETE CBC W/AUTO DIFF WBC: CPT

## 2021-01-22 PROCEDURE — 96374 THER/PROPH/DIAG INJ IV PUSH: CPT

## 2021-01-22 PROCEDURE — 83690 ASSAY OF LIPASE: CPT

## 2021-01-22 PROCEDURE — 87086 URINE CULTURE/COLONY COUNT: CPT

## 2021-01-22 PROCEDURE — 96375 TX/PRO/DX INJ NEW DRUG ADDON: CPT

## 2021-01-22 PROCEDURE — 700111 HCHG RX REV CODE 636 W/ 250 OVERRIDE (IP): Performed by: EMERGENCY MEDICINE

## 2021-01-22 RX ORDER — SODIUM CHLORIDE 9 MG/ML
1000 INJECTION, SOLUTION INTRAVENOUS ONCE
Status: COMPLETED | OUTPATIENT
Start: 2021-01-23 | End: 2021-01-23

## 2021-01-22 RX ORDER — DIPHENHYDRAMINE HYDROCHLORIDE 50 MG/ML
25 INJECTION INTRAMUSCULAR; INTRAVENOUS ONCE
Status: COMPLETED | OUTPATIENT
Start: 2021-01-23 | End: 2021-01-22

## 2021-01-22 RX ADMIN — FENTANYL CITRATE 100 MCG: 50 INJECTION, SOLUTION INTRAMUSCULAR; INTRAVENOUS at 23:49

## 2021-01-22 RX ADMIN — DIPHENHYDRAMINE HYDROCHLORIDE 25 MG: 50 INJECTION INTRAMUSCULAR; INTRAVENOUS at 23:49

## 2021-01-22 ASSESSMENT — LIFESTYLE VARIABLES
TOTAL SCORE: 0
TOTAL SCORE: 0
HAVE YOU EVER FELT YOU SHOULD CUT DOWN ON YOUR DRINKING: NO
DOES PATIENT WANT TO STOP DRINKING: NO
TOTAL SCORE: 0
HAVE PEOPLE ANNOYED YOU BY CRITICIZING YOUR DRINKING: NO
CONSUMPTION TOTAL: INCOMPLETE
EVER HAD A DRINK FIRST THING IN THE MORNING TO STEADY YOUR NERVES TO GET RID OF A HANGOVER: NO
DO YOU DRINK ALCOHOL: NO
EVER FELT BAD OR GUILTY ABOUT YOUR DRINKING: NO

## 2021-01-22 ASSESSMENT — FIBROSIS 4 INDEX: FIB4 SCORE: 0.41

## 2021-01-23 VITALS
SYSTOLIC BLOOD PRESSURE: 104 MMHG | DIASTOLIC BLOOD PRESSURE: 59 MMHG | RESPIRATION RATE: 18 BRPM | HEIGHT: 64 IN | OXYGEN SATURATION: 92 % | TEMPERATURE: 97.7 F | WEIGHT: 160.94 LBS | HEART RATE: 68 BPM | BODY MASS INDEX: 27.48 KG/M2

## 2021-01-23 LAB
ALBUMIN SERPL BCP-MCNC: 3.8 G/DL (ref 3.2–4.9)
ALBUMIN/GLOB SERPL: 1.5 G/DL
ALP SERPL-CCNC: 92 U/L (ref 30–99)
ALT SERPL-CCNC: 18 U/L (ref 2–50)
ANION GAP SERPL CALC-SCNC: 11 MMOL/L (ref 7–16)
APPEARANCE UR: ABNORMAL
AST SERPL-CCNC: 15 U/L (ref 12–45)
BACTERIA #/AREA URNS HPF: ABNORMAL /HPF
BILIRUB SERPL-MCNC: 0.2 MG/DL (ref 0.1–1.5)
BILIRUB UR QL STRIP.AUTO: NEGATIVE
BUN SERPL-MCNC: 8 MG/DL (ref 8–22)
CALCIUM SERPL-MCNC: 9.1 MG/DL (ref 8.5–10.5)
CHLORIDE SERPL-SCNC: 104 MMOL/L (ref 96–112)
CO2 SERPL-SCNC: 24 MMOL/L (ref 20–33)
COLOR UR: YELLOW
CREAT SERPL-MCNC: 0.62 MG/DL (ref 0.5–1.4)
EPI CELLS #/AREA URNS HPF: ABNORMAL /HPF
GLOBULIN SER CALC-MCNC: 2.6 G/DL (ref 1.9–3.5)
GLUCOSE SERPL-MCNC: 111 MG/DL (ref 65–99)
GLUCOSE UR STRIP.AUTO-MCNC: NEGATIVE MG/DL
HCG SERPL QL: NEGATIVE
HYALINE CASTS #/AREA URNS LPF: ABNORMAL /LPF
KETONES UR STRIP.AUTO-MCNC: ABNORMAL MG/DL
LEUKOCYTE ESTERASE UR QL STRIP.AUTO: ABNORMAL
LIPASE SERPL-CCNC: 14 U/L (ref 11–82)
MICRO URNS: ABNORMAL
NITRITE UR QL STRIP.AUTO: NEGATIVE
PH UR STRIP.AUTO: 5.5 [PH] (ref 5–8)
POTASSIUM SERPL-SCNC: 3.6 MMOL/L (ref 3.6–5.5)
PROT SERPL-MCNC: 6.4 G/DL (ref 6–8.2)
PROT UR QL STRIP: NEGATIVE MG/DL
RBC # URNS HPF: ABNORMAL /HPF
RBC UR QL AUTO: ABNORMAL
SODIUM SERPL-SCNC: 139 MMOL/L (ref 135–145)
SP GR UR STRIP.AUTO: 1.02
UROBILINOGEN UR STRIP.AUTO-MCNC: 0.2 MG/DL
WBC #/AREA URNS HPF: ABNORMAL /HPF

## 2021-01-23 PROCEDURE — A9270 NON-COVERED ITEM OR SERVICE: HCPCS | Performed by: EMERGENCY MEDICINE

## 2021-01-23 PROCEDURE — 700102 HCHG RX REV CODE 250 W/ 637 OVERRIDE(OP): Performed by: EMERGENCY MEDICINE

## 2021-01-23 PROCEDURE — 700105 HCHG RX REV CODE 258: Performed by: EMERGENCY MEDICINE

## 2021-01-23 RX ORDER — OXYCODONE HYDROCHLORIDE AND ACETAMINOPHEN 5; 325 MG/1; MG/1
1 TABLET ORAL ONCE
Status: COMPLETED | OUTPATIENT
Start: 2021-01-23 | End: 2021-01-23

## 2021-01-23 RX ADMIN — OXYCODONE AND ACETAMINOPHEN 1 TABLET: 5; 325 TABLET ORAL at 01:01

## 2021-01-23 RX ADMIN — SODIUM CHLORIDE 1000 ML: 9 INJECTION, SOLUTION INTRAVENOUS at 00:00

## 2021-01-23 NOTE — ED NOTES
Discharge instructions given to patient, a verbal understanding of all instructions was stated. IV removed, cathlon intact, site without s/s of infection. Pt preferred to walk out, mother to drive home. VSS, all belongings accounted for.

## 2021-01-23 NOTE — ED TRIAGE NOTES
Chief Complaint   Patient presents with   • Pelvic Pain     Patient ambulatory to triage. States c/o of increasing pelvic pain since Tuesday. Patient states that it feels like an endometriosis flare-up. Patient has an appointment with pcp this week, but pain level is getting too intense.

## 2021-01-23 NOTE — ED PROVIDER NOTES
ED Provider Note    CHIEF COMPLAINT  Chief Complaint   Patient presents with   • Pelvic Pain       HPI  Lizz Storey is a 35 y.o. female who presents with pelvic pain.  The patient states she has a history of recurrent flareups of endometriosis.  She feels this is the source of her current pain.  She states she has severe pelvic pain that is worse with walking and better when she lays down.  She describes the pain as sharp and localized to the pelvic region.  She does not have any nausea or vomiting.  She has not had any fevers.  She denies difficulties with urination.  She has not had any change in her bowel habits.    REVIEW OF SYSTEMS  See HPI for further details. All other systems are negative.     PAST MEDICAL HISTORY  Past Medical History:   Diagnosis Date   • ASTHMA    • Cyst of ovary, left    • Endometriosis     with multiple lap procedures   • Fibroid, uterus    • Genital herpes in women    • HPV in female    • Kidney infection    • Psychiatric disorder     depression       FAMILY HISTORY  [unfilled]    SOCIAL HISTORY  Social History     Socioeconomic History   • Marital status: Single     Spouse name: Not on file   • Number of children: Not on file   • Years of education: Not on file   • Highest education level: Not on file   Occupational History   • Not on file   Social Needs   • Financial resource strain: Not on file   • Food insecurity     Worry: Not on file     Inability: Not on file   • Transportation needs     Medical: Not on file     Non-medical: Not on file   Tobacco Use   • Smoking status: Former Smoker     Packs/day: 0.25     Years: 15.00     Pack years: 3.75   • Smokeless tobacco: Never Used   • Tobacco comment: 1.5 years   Substance and Sexual Activity   • Alcohol use: No     Comment: 2 times per year   • Drug use: Yes     Types: Inhaled, Marijuana     Comment: marijuana 2 x per month   • Sexual activity: Not Currently   Lifestyle   • Physical activity     Days per week: Not on file      "Minutes per session: Not on file   • Stress: Not on file   Relationships   • Social connections     Talks on phone: Not on file     Gets together: Not on file     Attends Church service: Not on file     Active member of club or organization: Not on file     Attends meetings of clubs or organizations: Not on file     Relationship status: Not on file   • Intimate partner violence     Fear of current or ex partner: Not on file     Emotionally abused: Not on file     Physically abused: Not on file     Forced sexual activity: Not on file   Other Topics Concern   • Not on file   Social History Narrative   • Not on file       SURGICAL HISTORY  Past Surgical History:   Procedure Laterality Date   • APPENDECTOMY     • CHRISTEL BY LAPAROSCOPY     • LAPAROSCOPY      X4   • OTHER ABDOMINAL SURGERY         CURRENT MEDICATIONS  Home Medications     Reviewed by Michelle Abbasi R.N. (Registered Nurse) on 01/22/21 at 2301  Med List Status: Partial   Medication Last Dose Status   acyclovir (ZOVIRAX) 400 MG tablet  Active   albuterol 108 (90 Base) MCG/ACT Aero Soln inhalation aerosol  Active   budesonide-formoterol (SYMBICORT) 80-4.5 MCG/ACT Aerosol  Active   fluconazole (DIFLUCAN) 150 MG tablet  Active   ibuprofen (MOTRIN) 800 MG Tab  Active   ipratropium-albuterol (DUONEB) 0.5-2.5 (3) MG/3ML nebulizer solution  Active   norethindrone (AYGESTIN) 5 MG tablet  Active   ondansetron (ZOFRAN ODT) 4 MG TABLET DISPERSIBLE  Active   promethazine (PHENERGAN) 25 MG Tab  Active   traZODone (DESYREL) 50 MG Tab  Active   valacyclovir (VALTREX) 1 GM Tab  Active                ALLERGIES  Allergies   Allergen Reactions   • Carrot [Daucus Carota] Anaphylaxis     Only raw carrot; cooked carrot ok.   • Ciprofloxacin Hives   • Keflex      Mild hand swelling   • Ketorolac Tromethamine Rash   • Morphine Hives   • Penicillins Rash       PHYSICAL EXAM  VITAL SIGNS: BP (!) 98/60   Pulse 91   Temp 36.5 °C (97.7 °F) (Temporal)   Resp 18   Ht 1.626 m (5' 4\") "   Wt 73 kg (160 lb 15 oz)   SpO2 96%   BMI 27.62 kg/m²       Constitutional: Mild acute distress, Non-toxic appearance.   HENT: Normocephalic, Atraumatic, Bilateral external ears normal, Oropharynx moist, No oral exudates, Nose normal.   Eyes: PERRLA, EOMI, Conjunctiva normal, No discharge.   Neck: Normal range of motion, No tenderness, Supple, No stridor.   Lymphatic: No lymphadenopathy noted.   Cardiovascular: Normal heart rate, Normal rhythm, No murmurs, No rubs, No gallops.   Thorax & Lungs: Normal breath sounds, No respiratory distress, No wheezing, No chest tenderness.   Abdomen: Bowel sounds normal, Soft, suprapubic tenderness, No masses, No pulsatile masses.   Skin: Warm, Dry, No erythema, No rash.   Back: No tenderness, No CVA tenderness.   Extremities: Intact distal pulses, No edema, No tenderness, No cyanosis, No clubbing.   Neurologic: Alert & oriented x 3, Normal motor function, Normal sensory function, No focal deficits noted.   Psychiatric: Affect normal, Judgment normal, Mood normal.     Results for orders placed or performed during the hospital encounter of 01/22/21   CBC WITH DIFFERENTIAL   Result Value Ref Range    WBC 10.3 4.8 - 10.8 K/uL    RBC 4.86 4.20 - 5.40 M/uL    Hemoglobin 14.7 12.0 - 16.0 g/dL    Hematocrit 44.3 37.0 - 47.0 %    MCV 91.2 81.4 - 97.8 fL    MCH 30.2 27.0 - 33.0 pg    MCHC 33.2 (L) 33.6 - 35.0 g/dL    RDW 47.4 35.9 - 50.0 fL    Platelet Count 315 164 - 446 K/uL    MPV 11.4 9.0 - 12.9 fL    Neutrophils-Polys 72.30 (H) 44.00 - 72.00 %    Lymphocytes 20.90 (L) 22.00 - 41.00 %    Monocytes 4.50 0.00 - 13.40 %    Eosinophils 1.70 0.00 - 6.90 %    Basophils 0.30 0.00 - 1.80 %    Immature Granulocytes 0.30 0.00 - 0.90 %    Nucleated RBC 0.00 /100 WBC    Neutrophils (Absolute) 7.47 (H) 2.00 - 7.15 K/uL    Lymphs (Absolute) 2.16 1.00 - 4.80 K/uL    Monos (Absolute) 0.46 0.00 - 0.85 K/uL    Eos (Absolute) 0.18 0.00 - 0.51 K/uL    Baso (Absolute) 0.03 0.00 - 0.12 K/uL    Immature  Granulocytes (abs) 0.03 0.00 - 0.11 K/uL    NRBC (Absolute) 0.00 K/uL   COMP METABOLIC PANEL   Result Value Ref Range    Sodium 139 135 - 145 mmol/L    Potassium 3.6 3.6 - 5.5 mmol/L    Chloride 104 96 - 112 mmol/L    Co2 24 20 - 33 mmol/L    Anion Gap 11.0 7.0 - 16.0    Glucose 111 (H) 65 - 99 mg/dL    Bun 8 8 - 22 mg/dL    Creatinine 0.62 0.50 - 1.40 mg/dL    Calcium 9.1 8.5 - 10.5 mg/dL    AST(SGOT) 15 12 - 45 U/L    ALT(SGPT) 18 2 - 50 U/L    Alkaline Phosphatase 92 30 - 99 U/L    Total Bilirubin 0.2 0.1 - 1.5 mg/dL    Albumin 3.8 3.2 - 4.9 g/dL    Total Protein 6.4 6.0 - 8.2 g/dL    Globulin 2.6 1.9 - 3.5 g/dL    A-G Ratio 1.5 g/dL   LIPASE   Result Value Ref Range    Lipase 14 11 - 82 U/L   HCG QUAL SERUM   Result Value Ref Range    Beta-Hcg Qualitative Serum Negative Negative   URINALYSIS,CULTURE IF INDICATED    Specimen: Urine, Clean Catch   Result Value Ref Range    Color Yellow     Character Cloudy (A)     Specific Gravity 1.021 <1.035    Ph 5.5 5.0 - 8.0    Glucose Negative Negative mg/dL    Ketones Trace (A) Negative mg/dL    Protein Negative Negative mg/dL    Bilirubin Negative Negative    Urobilinogen, Urine 0.2 Negative    Nitrite Negative Negative    Leukocyte Esterase Trace (A) Negative    Occult Blood Moderate (A) Negative    Micro Urine Req Microscopic    URINE MICROSCOPIC (W/UA)   Result Value Ref Range    WBC  (A) /hpf    RBC 0-2 /hpf    Bacteria Rare (A) None /hpf    Epithelial Cells Many (A) /hpf    Hyaline Cast 0-2 /lpf   URINE CULTURE(NEW)    Specimen: Urine   Result Value Ref Range    Significant Indicator NEG     Source UR     Site -     Culture Result -    ESTIMATED GFR   Result Value Ref Range    GFR If African American >60 >60 mL/min/1.73 m 2    GFR If Non African American >60 >60 mL/min/1.73 m 2       COURSE & MEDICAL DECISION MAKING  Pertinent Labs & Imaging studies reviewed. (See chart for details)  This a 35-year-old female who presents to the emergency department with pelvic  discomfort. Laboratory analysis is reassuring. Her abdomen is nonsurgical. Her urinalysis is contaminated. She does not have any urinary symptoms. I suspect this pain is from her endometriosis. She received intravenous fentanyl and Benadryl some relief. The time of discharge she states the pain seems to be getting worse. The patient will therefore receive Percocet for pain control. She'll continue anti-inflammatories on an outpatient basis and follow-up with her gynecologist. The patient will return if she is acutely worse.    FINAL IMPRESSION  1. Pelvic pain    Disposition  The patient will be discharged in stable condition         Electronically signed by: Onel Dey M.D., 1/22/2021 11:35 PM

## 2021-01-25 LAB
BACTERIA UR CULT: NORMAL
SIGNIFICANT IND 70042: NORMAL
SITE SITE: NORMAL
SOURCE SOURCE: NORMAL

## 2021-04-04 ENCOUNTER — HOSPITAL ENCOUNTER (EMERGENCY)
Facility: MEDICAL CENTER | Age: 36
End: 2021-04-04
Attending: EMERGENCY MEDICINE
Payer: MEDICAID

## 2021-04-04 ENCOUNTER — APPOINTMENT (OUTPATIENT)
Dept: RADIOLOGY | Facility: MEDICAL CENTER | Age: 36
End: 2021-04-04
Attending: EMERGENCY MEDICINE
Payer: MEDICAID

## 2021-04-04 VITALS
RESPIRATION RATE: 17 BRPM | DIASTOLIC BLOOD PRESSURE: 88 MMHG | WEIGHT: 167.99 LBS | HEIGHT: 64 IN | HEART RATE: 87 BPM | BODY MASS INDEX: 28.68 KG/M2 | SYSTOLIC BLOOD PRESSURE: 122 MMHG | TEMPERATURE: 98.8 F | OXYGEN SATURATION: 97 %

## 2021-04-04 DIAGNOSIS — R10.30 LOWER ABDOMINAL PAIN: ICD-10-CM

## 2021-04-04 LAB
ALBUMIN SERPL BCP-MCNC: 4.1 G/DL (ref 3.2–4.9)
ALBUMIN/GLOB SERPL: 1.5 G/DL
ALP SERPL-CCNC: 89 U/L (ref 30–99)
ALT SERPL-CCNC: 19 U/L (ref 2–50)
ANION GAP SERPL CALC-SCNC: 8 MMOL/L (ref 7–16)
APPEARANCE UR: ABNORMAL
AST SERPL-CCNC: 18 U/L (ref 12–45)
BACTERIA #/AREA URNS HPF: ABNORMAL /HPF
BASOPHILS # BLD AUTO: 0.4 % (ref 0–1.8)
BASOPHILS # BLD: 0.02 K/UL (ref 0–0.12)
BILIRUB SERPL-MCNC: 0.2 MG/DL (ref 0.1–1.5)
BILIRUB UR QL STRIP.AUTO: NEGATIVE
BUN SERPL-MCNC: 9 MG/DL (ref 8–22)
CALCIUM SERPL-MCNC: 9 MG/DL (ref 8.5–10.5)
CHLORIDE SERPL-SCNC: 102 MMOL/L (ref 96–112)
CO2 SERPL-SCNC: 25 MMOL/L (ref 20–33)
COLOR UR: YELLOW
CREAT SERPL-MCNC: 0.77 MG/DL (ref 0.5–1.4)
EOSINOPHIL # BLD AUTO: 0.25 K/UL (ref 0–0.51)
EOSINOPHIL NFR BLD: 4.8 % (ref 0–6.9)
EPI CELLS #/AREA URNS HPF: ABNORMAL /HPF
ERYTHROCYTE [DISTWIDTH] IN BLOOD BY AUTOMATED COUNT: 44.8 FL (ref 35.9–50)
GLOBULIN SER CALC-MCNC: 2.8 G/DL (ref 1.9–3.5)
GLUCOSE SERPL-MCNC: 111 MG/DL (ref 65–99)
GLUCOSE UR STRIP.AUTO-MCNC: NEGATIVE MG/DL
HCG SERPL QL: NEGATIVE
HCT VFR BLD AUTO: 49.2 % (ref 37–47)
HGB BLD-MCNC: 16.4 G/DL (ref 12–16)
HYALINE CASTS #/AREA URNS LPF: ABNORMAL /LPF
IMM GRANULOCYTES # BLD AUTO: 0.01 K/UL (ref 0–0.11)
IMM GRANULOCYTES NFR BLD AUTO: 0.2 % (ref 0–0.9)
KETONES UR STRIP.AUTO-MCNC: NEGATIVE MG/DL
LEUKOCYTE ESTERASE UR QL STRIP.AUTO: ABNORMAL
LIPASE SERPL-CCNC: 11 U/L (ref 11–82)
LYMPHOCYTES # BLD AUTO: 1.57 K/UL (ref 1–4.8)
LYMPHOCYTES NFR BLD: 30.3 % (ref 22–41)
MCH RBC QN AUTO: 30.1 PG (ref 27–33)
MCHC RBC AUTO-ENTMCNC: 33.3 G/DL (ref 33.6–35)
MCV RBC AUTO: 90.4 FL (ref 81.4–97.8)
MICRO URNS: ABNORMAL
MONOCYTES # BLD AUTO: 0.27 K/UL (ref 0–0.85)
MONOCYTES NFR BLD AUTO: 5.2 % (ref 0–13.4)
NEUTROPHILS # BLD AUTO: 3.06 K/UL (ref 2–7.15)
NEUTROPHILS NFR BLD: 59.1 % (ref 44–72)
NITRITE UR QL STRIP.AUTO: NEGATIVE
NRBC # BLD AUTO: 0 K/UL
NRBC BLD-RTO: 0 /100 WBC
PH UR STRIP.AUTO: 8 [PH] (ref 5–8)
PLATELET # BLD AUTO: 249 K/UL (ref 164–446)
PMV BLD AUTO: 10.7 FL (ref 9–12.9)
POTASSIUM SERPL-SCNC: 4.2 MMOL/L (ref 3.6–5.5)
PROT SERPL-MCNC: 6.9 G/DL (ref 6–8.2)
PROT UR QL STRIP: NEGATIVE MG/DL
RBC # BLD AUTO: 5.44 M/UL (ref 4.2–5.4)
RBC # URNS HPF: ABNORMAL /HPF
RBC UR QL AUTO: ABNORMAL
SODIUM SERPL-SCNC: 135 MMOL/L (ref 135–145)
SP GR UR STRIP.AUTO: 1.02
UROBILINOGEN UR STRIP.AUTO-MCNC: 0.2 MG/DL
WBC # BLD AUTO: 5.2 K/UL (ref 4.8–10.8)
WBC #/AREA URNS HPF: ABNORMAL /HPF

## 2021-04-04 PROCEDURE — 84703 CHORIONIC GONADOTROPIN ASSAY: CPT

## 2021-04-04 PROCEDURE — 81001 URINALYSIS AUTO W/SCOPE: CPT

## 2021-04-04 PROCEDURE — 85025 COMPLETE CBC W/AUTO DIFF WBC: CPT

## 2021-04-04 PROCEDURE — 96374 THER/PROPH/DIAG INJ IV PUSH: CPT

## 2021-04-04 PROCEDURE — 80053 COMPREHEN METABOLIC PANEL: CPT

## 2021-04-04 PROCEDURE — 96375 TX/PRO/DX INJ NEW DRUG ADDON: CPT

## 2021-04-04 PROCEDURE — 99284 EMERGENCY DEPT VISIT MOD MDM: CPT

## 2021-04-04 PROCEDURE — 700111 HCHG RX REV CODE 636 W/ 250 OVERRIDE (IP): Performed by: EMERGENCY MEDICINE

## 2021-04-04 PROCEDURE — 83690 ASSAY OF LIPASE: CPT

## 2021-04-04 PROCEDURE — 76856 US EXAM PELVIC COMPLETE: CPT

## 2021-04-04 PROCEDURE — 96376 TX/PRO/DX INJ SAME DRUG ADON: CPT

## 2021-04-04 RX ORDER — ONDANSETRON 2 MG/ML
4 INJECTION INTRAMUSCULAR; INTRAVENOUS ONCE
Status: COMPLETED | OUTPATIENT
Start: 2021-04-04 | End: 2021-04-04

## 2021-04-04 RX ORDER — HYDROMORPHONE HYDROCHLORIDE 1 MG/ML
0.5 INJECTION, SOLUTION INTRAMUSCULAR; INTRAVENOUS; SUBCUTANEOUS ONCE
Status: COMPLETED | OUTPATIENT
Start: 2021-04-04 | End: 2021-04-04

## 2021-04-04 RX ORDER — HYDROMORPHONE HYDROCHLORIDE 1 MG/ML
1 INJECTION, SOLUTION INTRAMUSCULAR; INTRAVENOUS; SUBCUTANEOUS ONCE
Status: COMPLETED | OUTPATIENT
Start: 2021-04-04 | End: 2021-04-04

## 2021-04-04 RX ADMIN — HYDROMORPHONE HYDROCHLORIDE 0.5 MG: 1 INJECTION, SOLUTION INTRAMUSCULAR; INTRAVENOUS; SUBCUTANEOUS at 14:52

## 2021-04-04 RX ADMIN — ONDANSETRON 4 MG: 2 INJECTION INTRAMUSCULAR; INTRAVENOUS at 13:39

## 2021-04-04 RX ADMIN — HYDROMORPHONE HYDROCHLORIDE 1 MG: 1 INJECTION, SOLUTION INTRAMUSCULAR; INTRAVENOUS; SUBCUTANEOUS at 13:41

## 2021-04-04 ASSESSMENT — FIBROSIS 4 INDEX: FIB4 SCORE: 0.4

## 2021-04-04 ASSESSMENT — LIFESTYLE VARIABLES: DO YOU DRINK ALCOHOL: YES

## 2021-04-04 NOTE — ED PROVIDER NOTES
ED Provider Note    CHIEF COMPLAINT  Chief Complaint   Patient presents with   • Abdominal Pain     pt c/o abd pain since thursday hst of endometriosis    • Nausea     taking zofran pta with no relief    • Painful Urination       HPI  Lizz Storey is a 35 y.o. female who presents for evaluation of low abdominal pain which the patient describes as in her pelvic region in the center.  She notes the area right above her pubic region.  She states it also can occur just to the right of this area as well.  She notes she has had an appendectomy and a cholecystectomy in the past.  She notes she has had ovarian cyst in the past also notes that she has an extensive history of pain from endometriosis and has been on Lupron several times over the past few years.  She also notes that she has uterine fibroids which cause pain.  She notes she has not had a period in years due to the medication she has been taking.  She notes additional symptoms of nausea and painful urination.    REVIEW OF SYSTEMS  Constitutional: No fevers or chills  Skin: No rashes  HEENT: No sore throat, runny nose  Neck: No neck pain  Chest: No pain   Pulm: No shortness of breath, cough, wheezing, stridor, or pain with inspiration/expiration  Gastrointestinal: No diarrhea, constipation, bloating, melena, or hematochezia   Genitourinary: No hematuria  Musculoskeletal: No recent trauma, pain, swelling, weakness  Neurologic: No sensory or motor changes. No confusion or disorientation.  Heme: No bleeding or bruising problems.   Immuno: No hx of recurrent infections      PAST MEDICAL HISTORY   has a past medical history of ASTHMA, Cyst of ovary, left, Endometriosis, Fibroid, uterus, Genital herpes in women, HPV in female, Kidney infection, and Psychiatric disorder.    SOCIAL HISTORY  Social History     Tobacco Use   • Smoking status: Former Smoker     Packs/day: 0.25     Years: 15.00     Pack years: 3.75   • Smokeless tobacco: Never Used   • Tobacco  "comment: 1.5 years   Substance and Sexual Activity   • Alcohol use: No     Comment: 2 times per year   • Drug use: Yes     Types: Inhaled, Marijuana     Comment: marijuana 2 x per month   • Sexual activity: Not Currently       SURGICAL HISTORY   has a past surgical history that includes jean by laparoscopy; laparoscopy; other abdominal surgery; and appendectomy.    CURRENT MEDICATIONS  Home Medications     Reviewed by Paulette Cheung R.N. (Registered Nurse) on 04/04/21 at 1148  Med List Status: Partial   Medication Last Dose Status   acyclovir (ZOVIRAX) 400 MG tablet prn Active   albuterol 108 (90 Base) MCG/ACT Aero Soln inhalation aerosol 4/4/2021 Active   budesonide-formoterol (SYMBICORT) 80-4.5 MCG/ACT Aerosol 4/4/2021 Active   fluconazole (DIFLUCAN) 150 MG tablet not taking Active   ibuprofen (MOTRIN) 800 MG Tab prn Active   ipratropium-albuterol (DUONEB) 0.5-2.5 (3) MG/3ML nebulizer solution 4/4/2021 Active   norethindrone (AYGESTIN) 5 MG tablet 4/4/2021 Active   ondansetron (ZOFRAN ODT) 4 MG TABLET DISPERSIBLE prn Active   promethazine (PHENERGAN) 25 MG Tab prn Active   traZODone (DESYREL) 50 MG Tab 4/3/2021 Active   valacyclovir (VALTREX) 1 GM Tab prn Active                ALLERGIES  Allergies   Allergen Reactions   • Carrot [Daucus Carota] Anaphylaxis     Only raw carrot; cooked carrot ok.   • Ciprofloxacin Hives   • Keflex      Mild hand swelling   • Ketorolac Tromethamine Rash   • Morphine Hives   • Penicillins Rash       PHYSICAL EXAM  VITAL SIGNS: /88   Pulse 87   Temp 37.1 °C (98.8 °F) (Temporal)   Resp 17   Ht 1.626 m (5' 4\")   Wt 76.2 kg (167 lb 15.9 oz)   SpO2 97%   BMI 28.84 kg/m²    Gen: Awake, alert, grimacing, tearful  HEENT: No signs of trauma, Bilateral external ears normal, Nose normal. Conjunctiva normal, Non-icteric.   Neck:  No tenderness, Supple, No masses  Lymphatic: No cervical lymphadenopathy noted.   Cardiovascular: Regular rate and rhythm, no murmurs.  Capillary refill " less than 3 seconds to all extremities, 2+ distal pulses.  Thorax & Lungs: Normal breath sounds, No respiratory distress, No wheezing bilateral chest rise  Abdomen: Bowel sounds normal, Soft, diffuse lower abdomen tenderness, No masses, No pulsatile masses. No Guarding or rebound  Skin: Warm, Dry, No erythema, No rash noted to exposed areas.   Back: No bony tenderness, No CVA tenderness.   Extremities: Intact distal pulses, No edema  Neurologic: Alert , no facial droop, grossly normal coordination and strength  Psychiatric: Affect pleasant but anxious      LABS  Results for orders placed or performed during the hospital encounter of 04/04/21   CBC WITH DIFFERENTIAL   Result Value Ref Range    WBC 5.2 4.8 - 10.8 K/uL    RBC 5.44 (H) 4.20 - 5.40 M/uL    Hemoglobin 16.4 (H) 12.0 - 16.0 g/dL    Hematocrit 49.2 (H) 37.0 - 47.0 %    MCV 90.4 81.4 - 97.8 fL    MCH 30.1 27.0 - 33.0 pg    MCHC 33.3 (L) 33.6 - 35.0 g/dL    RDW 44.8 35.9 - 50.0 fL    Platelet Count 249 164 - 446 K/uL    MPV 10.7 9.0 - 12.9 fL    Neutrophils-Polys 59.10 44.00 - 72.00 %    Lymphocytes 30.30 22.00 - 41.00 %    Monocytes 5.20 0.00 - 13.40 %    Eosinophils 4.80 0.00 - 6.90 %    Basophils 0.40 0.00 - 1.80 %    Immature Granulocytes 0.20 0.00 - 0.90 %    Nucleated RBC 0.00 /100 WBC    Neutrophils (Absolute) 3.06 2.00 - 7.15 K/uL    Lymphs (Absolute) 1.57 1.00 - 4.80 K/uL    Monos (Absolute) 0.27 0.00 - 0.85 K/uL    Eos (Absolute) 0.25 0.00 - 0.51 K/uL    Baso (Absolute) 0.02 0.00 - 0.12 K/uL    Immature Granulocytes (abs) 0.01 0.00 - 0.11 K/uL    NRBC (Absolute) 0.00 K/uL   COMP METABOLIC PANEL   Result Value Ref Range    Sodium 135 135 - 145 mmol/L    Potassium 4.2 3.6 - 5.5 mmol/L    Chloride 102 96 - 112 mmol/L    Co2 25 20 - 33 mmol/L    Anion Gap 8.0 7.0 - 16.0    Glucose 111 (H) 65 - 99 mg/dL    Bun 9 8 - 22 mg/dL    Creatinine 0.77 0.50 - 1.40 mg/dL    Calcium 9.0 8.5 - 10.5 mg/dL    AST(SGOT) 18 12 - 45 U/L    ALT(SGPT) 19 2 - 50 U/L     Alkaline Phosphatase 89 30 - 99 U/L    Total Bilirubin 0.2 0.1 - 1.5 mg/dL    Albumin 4.1 3.2 - 4.9 g/dL    Total Protein 6.9 6.0 - 8.2 g/dL    Globulin 2.8 1.9 - 3.5 g/dL    A-G Ratio 1.5 g/dL   LIPASE   Result Value Ref Range    Lipase 11 11 - 82 U/L   URINALYSIS    Specimen: Blood   Result Value Ref Range    Color Yellow     Character Cloudy (A)     Specific Gravity 1.017 <1.035    Ph 8.0 5.0 - 8.0    Glucose Negative Negative mg/dL    Ketones Negative Negative mg/dL    Protein Negative Negative mg/dL    Bilirubin Negative Negative    Urobilinogen, Urine 0.2 Negative    Nitrite Negative Negative    Leukocyte Esterase Small (A) Negative    Occult Blood Small (A) Negative    Micro Urine Req Microscopic    URINE MICROSCOPIC (W/UA)   Result Value Ref Range    WBC 5-10 (A) /hpf    RBC 2-5 (A) /hpf    Bacteria Few (A) None /hpf    Epithelial Cells Many (A) /hpf    Hyaline Cast 6-10 (A) /lpf   ESTIMATED GFR   Result Value Ref Range    GFR If African American >60 >60 mL/min/1.73 m 2    GFR If Non African American >60 >60 mL/min/1.73 m 2   HCG QUAL SERUM   Result Value Ref Range    Beta-Hcg Qualitative Serum Negative Negative       RADIOLOGY  US-PELVIC COMPLETE (TRANSABDOMINAL/TRANSVAGINAL) (COMBO)   Final Result      Posterior fundal 3.4 cm masslike area most likely is a leiomyoma and this has a mildly enlarged over the last year. Focal adenomyosis is possible. A less benign mass is considered less likely            COURSE & MEDICAL DECISION MAKING  Is a very pleasant 35-year-old female who is here for evaluation of of lower abdominal pain which is likely related to her chronic endometriosis or possibly the uterine fibroid.  I had a discussion with the patient regarding the work-up, I feel it is reasonable to perform screening labs and a pelvic ultrasound.  Patient does not have an appendix which signifies the process and I feel, given the number of CT scans she has had within the past 10 years, avoiding 1 now is very  reasonable provided her labs are reassuring and she does not deteriorate in any way.  She is not have any vaginal discharge or bleeding and is very low risk for STDs.  At this point, I do not feel a pelvic exam will benefit the patient or .  Ruling out other issues such as torsion is the priority.    Patient was reevaluated after labs and imaging returned.  She is much calmer but still states she is in pain.  It has improved with medication here but she states understanding that we cannot prescribe narcotics for chronic pain.  She does not appear septic or toxic and is comfortable with the plan for follow-up noting that she has scheduled upcoming appointment with her specialist.  She stated understanding that she could return at any time if her symptoms worsen or change in any way and to otherwise keep that appointment.    FINAL IMPRESSION  1. Lower abdominal pain        Electronically signed by: Artis Cordero M.D., 4/4/2021 12:25 PM

## 2021-04-04 NOTE — ED TRIAGE NOTES
".  Chief Complaint   Patient presents with   • Abdominal Pain     pt c/o abd pain since thursday hst of endometriosis    • Nausea     taking zofran pta with no relief    • Painful Urination   Onset Thursday afternoon, getting progressively worse.  + nausea, no vomiting.  No fever, \" chills and hot flashes. \"  Abdominal pain constant, middle and RLQ.  + dysuria, \" going more frequently. \"  Last BM yesterday early evening.     "

## 2021-04-04 NOTE — ED TRIAGE NOTES
Pt to triage.  Chief Complaint   Patient presents with   • Abdominal Pain     pt c/o abd pain since thursday hst of endometriosis    • Nausea     taking zofran pta with no relief    • Painful Urination

## 2022-10-04 ENCOUNTER — TELEPHONE (OUTPATIENT)
Dept: MEDICAL GROUP | Facility: MEDICAL CENTER | Age: 37
End: 2022-10-04
Payer: COMMERCIAL

## 2023-08-19 ENCOUNTER — APPOINTMENT (OUTPATIENT)
Dept: RADIOLOGY | Facility: MEDICAL CENTER | Age: 38
DRG: 918 | End: 2023-08-19
Attending: STUDENT IN AN ORGANIZED HEALTH CARE EDUCATION/TRAINING PROGRAM
Payer: COMMERCIAL

## 2023-08-19 ENCOUNTER — HOSPITAL ENCOUNTER (INPATIENT)
Facility: MEDICAL CENTER | Age: 38
LOS: 2 days | DRG: 918 | End: 2023-08-21
Attending: STUDENT IN AN ORGANIZED HEALTH CARE EDUCATION/TRAINING PROGRAM | Admitting: STUDENT IN AN ORGANIZED HEALTH CARE EDUCATION/TRAINING PROGRAM
Payer: COMMERCIAL

## 2023-08-19 DIAGNOSIS — R11.2 NAUSEA AND VOMITING, UNSPECIFIED VOMITING TYPE: ICD-10-CM

## 2023-08-19 DIAGNOSIS — N80.9 ENDOMETRIOSIS: ICD-10-CM

## 2023-08-19 DIAGNOSIS — T39.1X1A ACCIDENTAL ACETAMINOPHEN OVERDOSE, INITIAL ENCOUNTER: ICD-10-CM

## 2023-08-19 PROBLEM — K71.6 DRUG-INDUCED HEPATOTOXICITY: Status: ACTIVE | Noted: 2023-08-19

## 2023-08-19 PROBLEM — R00.1 BRADYCARDIA: Status: ACTIVE | Noted: 2023-08-19

## 2023-08-19 PROBLEM — T50.905A DRUG-INDUCED HEPATOTOXICITY: Status: ACTIVE | Noted: 2023-08-19

## 2023-08-19 PROBLEM — E87.5 HYPERKALEMIA: Status: ACTIVE | Noted: 2023-08-19

## 2023-08-19 PROBLEM — R82.71 ASYMPTOMATIC BACTERIURIA: Status: ACTIVE | Noted: 2023-08-19

## 2023-08-19 LAB
ALBUMIN SERPL BCP-MCNC: 3.6 G/DL (ref 3.2–4.9)
ALBUMIN SERPL BCP-MCNC: 3.8 G/DL (ref 3.2–4.9)
ALBUMIN SERPL BCP-MCNC: 3.8 G/DL (ref 3.2–4.9)
ALBUMIN SERPL BCP-MCNC: 4 G/DL (ref 3.2–4.9)
ALBUMIN SERPL BCP-MCNC: 4.5 G/DL (ref 3.2–4.9)
ALBUMIN/GLOB SERPL: 1.6 G/DL
ALBUMIN/GLOB SERPL: 1.7 G/DL
ALBUMIN/GLOB SERPL: 1.7 G/DL
ALP SERPL-CCNC: 101 U/L (ref 30–99)
ALP SERPL-CCNC: 73 U/L (ref 30–99)
ALP SERPL-CCNC: 74 U/L (ref 30–99)
ALP SERPL-CCNC: 78 U/L (ref 30–99)
ALP SERPL-CCNC: 79 U/L (ref 30–99)
ALT SERPL-CCNC: 35 U/L (ref 2–50)
ALT SERPL-CCNC: 39 U/L (ref 2–50)
ALT SERPL-CCNC: 50 U/L (ref 2–50)
ALT SERPL-CCNC: 52 U/L (ref 2–50)
ALT SERPL-CCNC: 70 U/L (ref 2–50)
AMPHET UR QL SCN: NEGATIVE
ANION GAP SERPL CALC-SCNC: 13 MMOL/L (ref 7–16)
ANION GAP SERPL CALC-SCNC: 14 MMOL/L (ref 7–16)
ANION GAP SERPL CALC-SCNC: 14 MMOL/L (ref 7–16)
ANION GAP SERPL CALC-SCNC: 15 MMOL/L (ref 7–16)
ANION GAP SERPL CALC-SCNC: 17 MMOL/L (ref 7–16)
APAP SERPL-MCNC: 9 UG/ML (ref 10–30)
APAP SERPL-MCNC: <5 UG/ML (ref 10–30)
APPEARANCE UR: ABNORMAL
AST SERPL-CCNC: 25 U/L (ref 12–45)
AST SERPL-CCNC: 28 U/L (ref 12–45)
AST SERPL-CCNC: 40 U/L (ref 12–45)
AST SERPL-CCNC: 44 U/L (ref 12–45)
AST SERPL-CCNC: 98 U/L (ref 12–45)
BACTERIA #/AREA URNS HPF: ABNORMAL /HPF
BARBITURATES UR QL SCN: NEGATIVE
BASOPHILS # BLD AUTO: 0.2 % (ref 0–1.8)
BASOPHILS # BLD: 0.02 K/UL (ref 0–0.12)
BENZODIAZ UR QL SCN: NEGATIVE
BILIRUB SERPL-MCNC: 0.3 MG/DL (ref 0.1–1.5)
BILIRUB SERPL-MCNC: 0.4 MG/DL (ref 0.1–1.5)
BILIRUB UR QL STRIP.AUTO: NEGATIVE
BUN SERPL-MCNC: 11 MG/DL (ref 8–22)
BUN SERPL-MCNC: 12 MG/DL (ref 8–22)
BUN SERPL-MCNC: 8 MG/DL (ref 8–22)
BUN SERPL-MCNC: 9 MG/DL (ref 8–22)
BUN SERPL-MCNC: 9 MG/DL (ref 8–22)
BZE UR QL SCN: NEGATIVE
CALCIUM ALBUM COR SERPL-MCNC: 8.8 MG/DL (ref 8.5–10.5)
CALCIUM ALBUM COR SERPL-MCNC: 8.8 MG/DL (ref 8.5–10.5)
CALCIUM ALBUM COR SERPL-MCNC: 9 MG/DL (ref 8.5–10.5)
CALCIUM ALBUM COR SERPL-MCNC: 9.3 MG/DL (ref 8.5–10.5)
CALCIUM ALBUM COR SERPL-MCNC: 9.4 MG/DL (ref 8.5–10.5)
CALCIUM SERPL-MCNC: 8.6 MG/DL (ref 8.5–10.5)
CALCIUM SERPL-MCNC: 8.7 MG/DL (ref 8.5–10.5)
CALCIUM SERPL-MCNC: 8.8 MG/DL (ref 8.5–10.5)
CALCIUM SERPL-MCNC: 9.2 MG/DL (ref 8.5–10.5)
CALCIUM SERPL-MCNC: 9.7 MG/DL (ref 8.5–10.5)
CANNABINOIDS UR QL SCN: POSITIVE
CHLORIDE SERPL-SCNC: 103 MMOL/L (ref 96–112)
CHLORIDE SERPL-SCNC: 104 MMOL/L (ref 96–112)
CHLORIDE SERPL-SCNC: 105 MMOL/L (ref 96–112)
CO2 SERPL-SCNC: 18 MMOL/L (ref 20–33)
CO2 SERPL-SCNC: 19 MMOL/L (ref 20–33)
CO2 SERPL-SCNC: 20 MMOL/L (ref 20–33)
CO2 SERPL-SCNC: 21 MMOL/L (ref 20–33)
CO2 SERPL-SCNC: 23 MMOL/L (ref 20–33)
COLOR UR: YELLOW
CREAT SERPL-MCNC: 0.67 MG/DL (ref 0.5–1.4)
CREAT SERPL-MCNC: 0.68 MG/DL (ref 0.5–1.4)
CREAT SERPL-MCNC: 0.78 MG/DL (ref 0.5–1.4)
CREAT SERPL-MCNC: 0.86 MG/DL (ref 0.5–1.4)
CREAT SERPL-MCNC: 1.01 MG/DL (ref 0.5–1.4)
EKG IMPRESSION: NORMAL
EOSINOPHIL # BLD AUTO: 0.04 K/UL (ref 0–0.51)
EOSINOPHIL NFR BLD: 0.4 % (ref 0–6.9)
EPI CELLS #/AREA URNS HPF: ABNORMAL /HPF
ERYTHROCYTE [DISTWIDTH] IN BLOOD BY AUTOMATED COUNT: 44.5 FL (ref 35.9–50)
FENTANYL UR QL: POSITIVE
GFR SERPLBLD CREATININE-BSD FMLA CKD-EPI: 113 ML/MIN/1.73 M 2
GFR SERPLBLD CREATININE-BSD FMLA CKD-EPI: 114 ML/MIN/1.73 M 2
GFR SERPLBLD CREATININE-BSD FMLA CKD-EPI: 72 ML/MIN/1.73 M 2
GFR SERPLBLD CREATININE-BSD FMLA CKD-EPI: 88 ML/MIN/1.73 M 2
GFR SERPLBLD CREATININE-BSD FMLA CKD-EPI: 99 ML/MIN/1.73 M 2
GLOBULIN SER CALC-MCNC: 2.2 G/DL (ref 1.9–3.5)
GLOBULIN SER CALC-MCNC: 2.3 G/DL (ref 1.9–3.5)
GLOBULIN SER CALC-MCNC: 2.3 G/DL (ref 1.9–3.5)
GLOBULIN SER CALC-MCNC: 2.5 G/DL (ref 1.9–3.5)
GLOBULIN SER CALC-MCNC: 2.9 G/DL (ref 1.9–3.5)
GLUCOSE SERPL-MCNC: 106 MG/DL (ref 65–99)
GLUCOSE SERPL-MCNC: 140 MG/DL (ref 65–99)
GLUCOSE SERPL-MCNC: 141 MG/DL (ref 65–99)
GLUCOSE SERPL-MCNC: 155 MG/DL (ref 65–99)
GLUCOSE SERPL-MCNC: 90 MG/DL (ref 65–99)
GLUCOSE UR STRIP.AUTO-MCNC: 100 MG/DL
HCG SERPL QL: NEGATIVE
HCT VFR BLD AUTO: 53.7 % (ref 37–47)
HGB BLD-MCNC: 17.8 G/DL (ref 12–16)
IMM GRANULOCYTES # BLD AUTO: 0.04 K/UL (ref 0–0.11)
IMM GRANULOCYTES NFR BLD AUTO: 0.4 % (ref 0–0.9)
INR PPP: 1.16 (ref 0.87–1.13)
INR PPP: 1.26 (ref 0.87–1.13)
KETONES UR STRIP.AUTO-MCNC: 40 MG/DL
LEUKOCYTE ESTERASE UR QL STRIP.AUTO: ABNORMAL
LIPASE SERPL-CCNC: 22 U/L (ref 11–82)
LYMPHOCYTES # BLD AUTO: 1.38 K/UL (ref 1–4.8)
LYMPHOCYTES NFR BLD: 13.8 % (ref 22–41)
MAGNESIUM SERPL-MCNC: 2.2 MG/DL (ref 1.5–2.5)
MCH RBC QN AUTO: 29.2 PG (ref 27–33)
MCHC RBC AUTO-ENTMCNC: 33.1 G/DL (ref 32.2–35.5)
MCV RBC AUTO: 88 FL (ref 81.4–97.8)
METHADONE UR QL SCN: POSITIVE
MICRO URNS: ABNORMAL
MONOCYTES # BLD AUTO: 0.37 K/UL (ref 0–0.85)
MONOCYTES NFR BLD AUTO: 3.7 % (ref 0–13.4)
NEUTROPHILS # BLD AUTO: 8.13 K/UL (ref 1.82–7.42)
NEUTROPHILS NFR BLD: 81.5 % (ref 44–72)
NITRITE UR QL STRIP.AUTO: NEGATIVE
NRBC # BLD AUTO: 0 K/UL
NRBC BLD-RTO: 0 /100 WBC (ref 0–0.2)
OPIATES UR QL SCN: NEGATIVE
OXYCODONE UR QL SCN: NEGATIVE
PCP UR QL SCN: NEGATIVE
PH UR STRIP.AUTO: 5.5 [PH] (ref 5–8)
PHOSPHATE SERPL-MCNC: 3.1 MG/DL (ref 2.5–4.5)
PLATELET # BLD AUTO: 164 K/UL (ref 164–446)
PLATELETS.RETICULATED NFR BLD AUTO: 10.7 % (ref 0.6–13.1)
PMV BLD AUTO: 11.7 FL (ref 9–12.9)
POTASSIUM SERPL-SCNC: 3.4 MMOL/L (ref 3.6–5.5)
POTASSIUM SERPL-SCNC: 3.8 MMOL/L (ref 3.6–5.5)
POTASSIUM SERPL-SCNC: 3.9 MMOL/L (ref 3.6–5.5)
POTASSIUM SERPL-SCNC: 4 MMOL/L (ref 3.6–5.5)
POTASSIUM SERPL-SCNC: 5.6 MMOL/L (ref 3.6–5.5)
PROPOXYPH UR QL SCN: NEGATIVE
PROT SERPL-MCNC: 5.8 G/DL (ref 6–8.2)
PROT SERPL-MCNC: 6.1 G/DL (ref 6–8.2)
PROT SERPL-MCNC: 6.1 G/DL (ref 6–8.2)
PROT SERPL-MCNC: 6.5 G/DL (ref 6–8.2)
PROT SERPL-MCNC: 7.4 G/DL (ref 6–8.2)
PROT UR QL STRIP: 100 MG/DL
PROTHROMBIN TIME: 14.7 SEC (ref 12–14.6)
PROTHROMBIN TIME: 15.6 SEC (ref 12–14.6)
RBC # BLD AUTO: 6.1 M/UL (ref 4.2–5.4)
RBC # URNS HPF: ABNORMAL /HPF
RBC UR QL AUTO: NEGATIVE
SALICYLATES SERPL-MCNC: <1 MG/DL (ref 15–25)
SODIUM SERPL-SCNC: 137 MMOL/L (ref 135–145)
SODIUM SERPL-SCNC: 137 MMOL/L (ref 135–145)
SODIUM SERPL-SCNC: 139 MMOL/L (ref 135–145)
SODIUM SERPL-SCNC: 139 MMOL/L (ref 135–145)
SODIUM SERPL-SCNC: 140 MMOL/L (ref 135–145)
SP GR UR STRIP.AUTO: 1.04
UROBILINOGEN UR STRIP.AUTO-MCNC: 1 MG/DL
WBC # BLD AUTO: 10 K/UL (ref 4.8–10.8)
WBC #/AREA URNS HPF: ABNORMAL /HPF

## 2023-08-19 PROCEDURE — A9270 NON-COVERED ITEM OR SERVICE: HCPCS | Performed by: STUDENT IN AN ORGANIZED HEALTH CARE EDUCATION/TRAINING PROGRAM

## 2023-08-19 PROCEDURE — 80307 DRUG TEST PRSMV CHEM ANLYZR: CPT

## 2023-08-19 PROCEDURE — 76830 TRANSVAGINAL US NON-OB: CPT

## 2023-08-19 PROCEDURE — 80143 DRUG ASSAY ACETAMINOPHEN: CPT

## 2023-08-19 PROCEDURE — 81001 URINALYSIS AUTO W/SCOPE: CPT

## 2023-08-19 PROCEDURE — 700111 HCHG RX REV CODE 636 W/ 250 OVERRIDE (IP): Mod: JZ | Performed by: STUDENT IN AN ORGANIZED HEALTH CARE EDUCATION/TRAINING PROGRAM

## 2023-08-19 PROCEDURE — 85025 COMPLETE CBC W/AUTO DIFF WBC: CPT

## 2023-08-19 PROCEDURE — 700101 HCHG RX REV CODE 250: Mod: JZ | Performed by: STUDENT IN AN ORGANIZED HEALTH CARE EDUCATION/TRAINING PROGRAM

## 2023-08-19 PROCEDURE — 83735 ASSAY OF MAGNESIUM: CPT

## 2023-08-19 PROCEDURE — 94640 AIRWAY INHALATION TREATMENT: CPT

## 2023-08-19 PROCEDURE — 96366 THER/PROPH/DIAG IV INF ADDON: CPT

## 2023-08-19 PROCEDURE — 700105 HCHG RX REV CODE 258: Mod: JZ | Performed by: STUDENT IN AN ORGANIZED HEALTH CARE EDUCATION/TRAINING PROGRAM

## 2023-08-19 PROCEDURE — 83690 ASSAY OF LIPASE: CPT

## 2023-08-19 PROCEDURE — 700101 HCHG RX REV CODE 250: Performed by: NURSE PRACTITIONER

## 2023-08-19 PROCEDURE — 84703 CHORIONIC GONADOTROPIN ASSAY: CPT

## 2023-08-19 PROCEDURE — 700105 HCHG RX REV CODE 258: Performed by: STUDENT IN AN ORGANIZED HEALTH CARE EDUCATION/TRAINING PROGRAM

## 2023-08-19 PROCEDURE — 94760 N-INVAS EAR/PLS OXIMETRY 1: CPT

## 2023-08-19 PROCEDURE — 96365 THER/PROPH/DIAG IV INF INIT: CPT

## 2023-08-19 PROCEDURE — 700102 HCHG RX REV CODE 250 W/ 637 OVERRIDE(OP): Performed by: STUDENT IN AN ORGANIZED HEALTH CARE EDUCATION/TRAINING PROGRAM

## 2023-08-19 PROCEDURE — 93005 ELECTROCARDIOGRAM TRACING: CPT | Performed by: STUDENT IN AN ORGANIZED HEALTH CARE EDUCATION/TRAINING PROGRAM

## 2023-08-19 PROCEDURE — 84100 ASSAY OF PHOSPHORUS: CPT

## 2023-08-19 PROCEDURE — 85055 RETICULATED PLATELET ASSAY: CPT

## 2023-08-19 PROCEDURE — 99223 1ST HOSP IP/OBS HIGH 75: CPT | Mod: AI | Performed by: STUDENT IN AN ORGANIZED HEALTH CARE EDUCATION/TRAINING PROGRAM

## 2023-08-19 PROCEDURE — 96376 TX/PRO/DX INJ SAME DRUG ADON: CPT

## 2023-08-19 PROCEDURE — 700111 HCHG RX REV CODE 636 W/ 250 OVERRIDE (IP): Mod: UD | Performed by: STUDENT IN AN ORGANIZED HEALTH CARE EDUCATION/TRAINING PROGRAM

## 2023-08-19 PROCEDURE — 80053 COMPREHEN METABOLIC PANEL: CPT

## 2023-08-19 PROCEDURE — 85610 PROTHROMBIN TIME: CPT

## 2023-08-19 PROCEDURE — 99285 EMERGENCY DEPT VISIT HI MDM: CPT

## 2023-08-19 PROCEDURE — 80179 DRUG ASSAY SALICYLATE: CPT

## 2023-08-19 PROCEDURE — 96375 TX/PRO/DX INJ NEW DRUG ADDON: CPT

## 2023-08-19 PROCEDURE — 36415 COLL VENOUS BLD VENIPUNCTURE: CPT

## 2023-08-19 PROCEDURE — 700105 HCHG RX REV CODE 258: Performed by: NURSE PRACTITIONER

## 2023-08-19 PROCEDURE — 770006 HCHG ROOM/CARE - MED/SURG/GYN SEMI*

## 2023-08-19 RX ORDER — AMOXICILLIN 250 MG
2 CAPSULE ORAL 2 TIMES DAILY
Status: DISCONTINUED | OUTPATIENT
Start: 2023-08-19 | End: 2023-08-21 | Stop reason: HOSPADM

## 2023-08-19 RX ORDER — ALBUTEROL SULFATE 90 UG/1
2 AEROSOL, METERED RESPIRATORY (INHALATION) EVERY 4 HOURS PRN
Status: DISCONTINUED | OUTPATIENT
Start: 2023-08-19 | End: 2023-08-19

## 2023-08-19 RX ORDER — IPRATROPIUM BROMIDE AND ALBUTEROL SULFATE 2.5; .5 MG/3ML; MG/3ML
3 SOLUTION RESPIRATORY (INHALATION)
Status: DISCONTINUED | OUTPATIENT
Start: 2023-08-19 | End: 2023-08-21 | Stop reason: HOSPADM

## 2023-08-19 RX ORDER — ONDANSETRON 2 MG/ML
4 INJECTION INTRAMUSCULAR; INTRAVENOUS EVERY 4 HOURS PRN
Status: DISCONTINUED | OUTPATIENT
Start: 2023-08-19 | End: 2023-08-21 | Stop reason: HOSPADM

## 2023-08-19 RX ORDER — OXYCODONE HYDROCHLORIDE 5 MG/1
5 TABLET ORAL EVERY 4 HOURS PRN
Status: DISCONTINUED | OUTPATIENT
Start: 2023-08-19 | End: 2023-08-21

## 2023-08-19 RX ORDER — HYDROMORPHONE HYDROCHLORIDE 1 MG/ML
0.5 INJECTION, SOLUTION INTRAMUSCULAR; INTRAVENOUS; SUBCUTANEOUS ONCE
Status: COMPLETED | OUTPATIENT
Start: 2023-08-19 | End: 2023-08-19

## 2023-08-19 RX ORDER — ONDANSETRON 4 MG/1
4 TABLET, ORALLY DISINTEGRATING ORAL EVERY 4 HOURS PRN
Status: DISCONTINUED | OUTPATIENT
Start: 2023-08-19 | End: 2023-08-21 | Stop reason: HOSPADM

## 2023-08-19 RX ORDER — PROMETHAZINE HYDROCHLORIDE 25 MG/1
12.5-25 TABLET ORAL EVERY 4 HOURS PRN
Status: DISCONTINUED | OUTPATIENT
Start: 2023-08-19 | End: 2023-08-21 | Stop reason: HOSPADM

## 2023-08-19 RX ORDER — PROCHLORPERAZINE EDISYLATE 5 MG/ML
5-10 INJECTION INTRAMUSCULAR; INTRAVENOUS EVERY 4 HOURS PRN
Status: DISCONTINUED | OUTPATIENT
Start: 2023-08-19 | End: 2023-08-21 | Stop reason: HOSPADM

## 2023-08-19 RX ORDER — SODIUM CHLORIDE, SODIUM LACTATE, POTASSIUM CHLORIDE, CALCIUM CHLORIDE 600; 310; 30; 20 MG/100ML; MG/100ML; MG/100ML; MG/100ML
INJECTION, SOLUTION INTRAVENOUS CONTINUOUS
Status: DISCONTINUED | OUTPATIENT
Start: 2023-08-19 | End: 2023-08-20

## 2023-08-19 RX ORDER — SODIUM CHLORIDE, SODIUM LACTATE, POTASSIUM CHLORIDE, CALCIUM CHLORIDE 600; 310; 30; 20 MG/100ML; MG/100ML; MG/100ML; MG/100ML
1000 INJECTION, SOLUTION INTRAVENOUS ONCE
Status: COMPLETED | OUTPATIENT
Start: 2023-08-19 | End: 2023-08-19

## 2023-08-19 RX ORDER — PROMETHAZINE HYDROCHLORIDE 25 MG/1
12.5-25 SUPPOSITORY RECTAL EVERY 4 HOURS PRN
Status: DISCONTINUED | OUTPATIENT
Start: 2023-08-19 | End: 2023-08-21 | Stop reason: HOSPADM

## 2023-08-19 RX ORDER — ONDANSETRON 2 MG/ML
4 INJECTION INTRAMUSCULAR; INTRAVENOUS ONCE
Status: COMPLETED | OUTPATIENT
Start: 2023-08-19 | End: 2023-08-19

## 2023-08-19 RX ORDER — POLYETHYLENE GLYCOL 3350 17 G/17G
1 POWDER, FOR SOLUTION ORAL
Status: DISCONTINUED | OUTPATIENT
Start: 2023-08-19 | End: 2023-08-21 | Stop reason: HOSPADM

## 2023-08-19 RX ORDER — BISACODYL 10 MG
10 SUPPOSITORY, RECTAL RECTAL
Status: DISCONTINUED | OUTPATIENT
Start: 2023-08-19 | End: 2023-08-21 | Stop reason: HOSPADM

## 2023-08-19 RX ORDER — HYDROMORPHONE HYDROCHLORIDE 1 MG/ML
0.5 INJECTION, SOLUTION INTRAMUSCULAR; INTRAVENOUS; SUBCUTANEOUS EVERY 4 HOURS PRN
Status: DISCONTINUED | OUTPATIENT
Start: 2023-08-19 | End: 2023-08-21 | Stop reason: HOSPADM

## 2023-08-19 RX ORDER — IBUPROFEN 200 MG
200 TABLET ORAL EVERY 6 HOURS PRN
COMMUNITY

## 2023-08-19 RX ADMIN — ONDANSETRON 4 MG: 2 INJECTION INTRAMUSCULAR; INTRAVENOUS at 07:55

## 2023-08-19 RX ADMIN — ONDANSETRON 4 MG: 2 INJECTION INTRAMUSCULAR; INTRAVENOUS at 04:16

## 2023-08-19 RX ADMIN — SODIUM CHLORIDE, POTASSIUM CHLORIDE, SODIUM LACTATE AND CALCIUM CHLORIDE: 600; 310; 30; 20 INJECTION, SOLUTION INTRAVENOUS at 21:16

## 2023-08-19 RX ADMIN — PROMETHAZINE HYDROCHLORIDE 12.5 MG: 25 TABLET ORAL at 10:56

## 2023-08-19 RX ADMIN — SODIUM CHLORIDE, POTASSIUM CHLORIDE, SODIUM LACTATE AND CALCIUM CHLORIDE: 600; 310; 30; 20 INJECTION, SOLUTION INTRAVENOUS at 06:00

## 2023-08-19 RX ADMIN — ONDANSETRON 4 MG: 2 INJECTION INTRAMUSCULAR; INTRAVENOUS at 21:12

## 2023-08-19 RX ADMIN — SODIUM CHLORIDE, POTASSIUM CHLORIDE, SODIUM LACTATE AND CALCIUM CHLORIDE: 600; 310; 30; 20 INJECTION, SOLUTION INTRAVENOUS at 12:42

## 2023-08-19 RX ADMIN — ONDANSETRON 4 MG: 2 INJECTION INTRAMUSCULAR; INTRAVENOUS at 12:41

## 2023-08-19 RX ADMIN — HYDROMORPHONE HYDROCHLORIDE 0.5 MG: 1 INJECTION, SOLUTION INTRAMUSCULAR; INTRAVENOUS; SUBCUTANEOUS at 02:45

## 2023-08-19 RX ADMIN — HYDROMORPHONE HYDROCHLORIDE 0.5 MG: 1 INJECTION, SOLUTION INTRAMUSCULAR; INTRAVENOUS; SUBCUTANEOUS at 17:03

## 2023-08-19 RX ADMIN — ONDANSETRON 4 MG: 2 INJECTION INTRAMUSCULAR; INTRAVENOUS at 17:03

## 2023-08-19 RX ADMIN — SODIUM CHLORIDE, POTASSIUM CHLORIDE, SODIUM LACTATE AND CALCIUM CHLORIDE 1000 ML: 600; 310; 30; 20 INJECTION, SOLUTION INTRAVENOUS at 02:30

## 2023-08-19 RX ADMIN — HYDROMORPHONE HYDROCHLORIDE 0.5 MG: 1 INJECTION, SOLUTION INTRAMUSCULAR; INTRAVENOUS; SUBCUTANEOUS at 07:56

## 2023-08-19 RX ADMIN — HYDROMORPHONE HYDROCHLORIDE 0.5 MG: 1 INJECTION, SOLUTION INTRAMUSCULAR; INTRAVENOUS; SUBCUTANEOUS at 03:55

## 2023-08-19 RX ADMIN — ACETYLCYSTEINE 16000 MG: 200 SOLUTION ORAL; RESPIRATORY (INHALATION) at 06:23

## 2023-08-19 RX ADMIN — IPRATROPIUM BROMIDE AND ALBUTEROL SULFATE 3 ML: 2.5; .5 SOLUTION RESPIRATORY (INHALATION) at 19:04

## 2023-08-19 RX ADMIN — HYDROMORPHONE HYDROCHLORIDE 0.5 MG: 1 INJECTION, SOLUTION INTRAMUSCULAR; INTRAVENOUS; SUBCUTANEOUS at 21:12

## 2023-08-19 RX ADMIN — ACETYLCYSTEINE 8000 MG: 200 SOLUTION ORAL; RESPIRATORY (INHALATION) at 10:52

## 2023-08-19 RX ADMIN — ONDANSETRON 4 MG: 2 INJECTION INTRAMUSCULAR; INTRAVENOUS at 02:28

## 2023-08-19 RX ADMIN — HYDROMORPHONE HYDROCHLORIDE 0.5 MG: 1 INJECTION, SOLUTION INTRAMUSCULAR; INTRAVENOUS; SUBCUTANEOUS at 12:26

## 2023-08-19 ASSESSMENT — COGNITIVE AND FUNCTIONAL STATUS - GENERAL
SUGGESTED CMS G CODE MODIFIER DAILY ACTIVITY: CH
MOBILITY SCORE: 24
DAILY ACTIVITIY SCORE: 24
SUGGESTED CMS G CODE MODIFIER MOBILITY: CH

## 2023-08-19 ASSESSMENT — ENCOUNTER SYMPTOMS
MUSCULOSKELETAL NEGATIVE: 1
VOMITING: 1
SHORTNESS OF BREATH: 0
FEVER: 0
CHILLS: 0
ABDOMINAL PAIN: 1
NAUSEA: 1
NAUSEA: 0
CARDIOVASCULAR NEGATIVE: 1
RESPIRATORY NEGATIVE: 1
NERVOUS/ANXIOUS: 1
COUGH: 0
DIARRHEA: 0
EYES NEGATIVE: 1
NEUROLOGICAL NEGATIVE: 1

## 2023-08-19 ASSESSMENT — PAIN DESCRIPTION - PAIN TYPE
TYPE: ACUTE PAIN

## 2023-08-19 ASSESSMENT — FIBROSIS 4 INDEX
FIB4 SCORE: 1.32
FIB4 SCORE: 1.07
FIB4 SCORE: 1.32

## 2023-08-19 NOTE — PROGRESS NOTES
Pharmacy Note: Poison control updated for accidental APAP overdose    Poison control case #:[id number]    Patient has been taking 6 pills of tylenol rotating with ibuprofen consistently for almost 2 days. Now with abd pain, N/V and elevated LFTs    Labs:  Recent Labs     08/19/23  0256   SODIUM 139   POTASSIUM 5.6*   CHLORIDE 103   CO2 21   BUN 12   CREATININE 1.01   GLUCOSE 140*   CALCIUM 9.7   MAGNESIUM 2.2   PHOSPHORUS 3.1   ASTSGOT 98*   ALTSGPT 70*   ALBUMIN 4.5   TBILIRUBIN 0.4   INR 1.16*            Levels:  APAP/salicylate/INR in progress    Discussed plan with NITZA Sunshine    Recommended Plan:  1. Initiate NAC therapy for chronic ingestion due to elevated AST/ALT.  2. Continue NAC until Scr/INR < 2, LFTs normal, APAP level undetectable and patient clinically well.     CMP/INR/APAP level ordered for 2000.

## 2023-08-19 NOTE — ED TRIAGE NOTES
"Chief Complaint   Patient presents with    Abdominal Pain     The pt reports lower abd pain yesterday, states hx of uterine fibroids. Pain is non-radiating, sharp, 9/10. The pt reports vomiting for the last 4 hours, denies bloody vomit. The pt also reports possible overdose of tylenol and ibuprofen, states that she has been alternating meds but taking 6 pills a time for the last 1.5 days.    N/V       Pt wheelchair to triage. Pt A&Ox4, for the above complaint.     Pt to lobby . Pt educated on alerting staff in changes to condition. Pt verbalized understanding. Protocol abd pain ordered.     BP (!) 146/79   Pulse 60   Temp 36.7 °C (98 °F) (Temporal)   Resp (!) 22   Ht 1.626 m (5' 4\")   Wt 81.3 kg (179 lb 3.7 oz)   SpO2 98%   BMI 30.77 kg/m²     "

## 2023-08-19 NOTE — ASSESSMENT & PLAN NOTE
EDP ordered ultrasound to evaluate for ovarian torsion, patient could not tolerate due to pain and refused continuation/reorder of exam  Stable uterine fibroid was seen on the ultrasound  Will follow up with ob as planned next week

## 2023-08-19 NOTE — ED NOTES
Bedside report received from Shawn RN, pt laying in bed, eyes closed, equal chest rise noted, pt on one liter of oxygen, no signs of distress noted at this time, will continue to monitor

## 2023-08-19 NOTE — ASSESSMENT & PLAN NOTE
History of endometriosis and believes she is in acute exacerbation now as it is similar to prior episodes  Was on leuprolide in the past but no longer, following with OB/GYN as outpatient

## 2023-08-19 NOTE — ED NOTES
BREAK RN: pt to green 24 with triage RN. Pt changed into gown and connected to monitor. Chart up for ERP.

## 2023-08-19 NOTE — ASSESSMENT & PLAN NOTE
Patient has been taking a lot of ibuprofen and acetaminophen for pain secondary to endometriosis/uterine fibroid exacerbation  Tylenol level was elevated slightly at 9.  LFTs were elevated AST 98, ALT 70  EDP discussed with poison control recommends acetylcysteine  Case discussed with pharmacy, acetylcysteine completed  See above

## 2023-08-19 NOTE — H&P
Hospital Medicine History & Physical Note    Date of Service  8/19/2023    Primary Care Physician  Pcp Pt States None    Code Status  Full Code    Chief Complaint  Chief Complaint   Patient presents with    Abdominal Pain     The pt reports lower abd pain yesterday, states hx of uterine fibroids. Pain is non-radiating, sharp, 9/10. The pt reports vomiting for the last 4 hours, denies bloody vomit. The pt also reports possible overdose of tylenol and ibuprofen, states that she has been alternating meds but taking 6 pills a time for the last 1.5 days.    N/V       History of Presenting Illness  Lizz Storey is a 38 y.o. female who presented 8/19/2023 with abdominal pain.  PMH of uterine fibroids, endometriosis.  Patient comes in complaining of lower abdominal pain associated with nausea/vomiting.  Pain is similar to her chronic recurrent fibroid pain started.  Denies any fever/chills, urinary or bowel symptoms.  She takes a lot of Tylenol and ibuprofen is not sure how much she took will believe she took way too much.    In the ED afebrile, bradycardic, hemodynamically stable.  Labs show potassium 5.6, LFTs elevated but less than 100.  Acetaminophen level at 9.  EDP discussed with poison control who recommends acetylcysteine.    I discussed the plan of care with patient, bedside RN, and edp .    Review of Systems  Review of Systems   Constitutional:  Negative for chills and fever.   Respiratory:  Negative for cough and shortness of breath.    Cardiovascular:  Negative for chest pain.   Gastrointestinal:  Positive for abdominal pain and vomiting. Negative for diarrhea and nausea.   Genitourinary:  Negative for dysuria and urgency.       Past Medical History   has a past medical history of ASTHMA, Cyst of ovary, left, Endometriosis, Fibroid, uterus, Genital herpes in women, HPV in female, Kidney infection, and Psychiatric disorder.    Surgical History   has a past surgical history that includes jean by  laparoscopy; laparoscopy; other abdominal surgery; and appendectomy.     Family History  Family history reviewed with patient. There is no family history that is pertinent to the chief complaint.     Social History   reports that she has quit smoking. She has a 3.75 pack-year smoking history. She has never used smokeless tobacco. She reports current drug use. Drugs: Inhaled and Marijuana. She reports that she does not drink alcohol.    Allergies  Allergies   Allergen Reactions    Carrot [Daucus Carota] Anaphylaxis     Only raw carrot; cooked carrot ok.    Ciprofloxacin Hives    Keflex      Mild hand swelling    Ketorolac Tromethamine Rash    Morphine Hives    Penicillins Rash       Medications  Prior to Admission Medications   Prescriptions Last Dose Informant Patient Reported? Taking?   acyclovir (ZOVIRAX) 400 MG tablet  Patient Yes No   Sig: Take 400 mg by mouth every day.   albuterol 108 (90 Base) MCG/ACT Aero Soln inhalation aerosol   No No   Sig: Inhale 2 Puffs by mouth every four hours as needed.   budesonide-formoterol (SYMBICORT) 80-4.5 MCG/ACT Aerosol   No No   Sig: Inhale 2 Puffs by mouth 2 Times a Day.   fluconazole (DIFLUCAN) 150 MG tablet   No No   Sig: Take 1 Tab by mouth every day.   Patient not taking: Reported on 5/27/2020   ibuprofen (MOTRIN) 800 MG Tab   No No   Sig: Take 1 Tab by mouth every 8 hours as needed.   ipratropium-albuterol (DUONEB) 0.5-2.5 (3) MG/3ML nebulizer solution   No No   Sig: USE 1 AMPULE IN NEBULIZER EVERY 4 HOURS AS NEEDED FOR SHORTNESS OF BREATH   norethindrone (AYGESTIN) 5 MG tablet   Yes No   Sig: Take 10 mg by mouth every day.   ondansetron (ZOFRAN ODT) 4 MG TABLET DISPERSIBLE   No No   Sig: Take 1 Tab by mouth every 8 hours as needed for Nausea.   promethazine (PHENERGAN) 25 MG Tab   No No   Sig: Take 1 Tab by mouth every 6 hours as needed for Nausea/Vomiting.   traZODone (DESYREL) 50 MG Tab   No No   Sig: TAKE 1 TABLET BY MOUTH AT BEDTIME AS NEEDED FOR SLEEP  "  valacyclovir (VALTREX) 1 GM Tab   No No   Sig: Take 1 Tab by mouth every day.      Facility-Administered Medications: None       Physical Exam  Temp:  [36.7 °C (98 °F)] 36.7 °C (98 °F)  Pulse:  [38-60] 38  Resp:  [18-22] 18  BP: (139-148)/(68-79) 139/68  SpO2:  [98 %-99 %] 99 %  Blood Pressure: 139/68   Temperature: 36.7 °C (98 °F)   Pulse: (!) 38   Respiration: 18   Pulse Oximetry: 99 %       Physical Exam  Constitutional:       Appearance: Normal appearance.   HENT:      Head: Normocephalic and atraumatic.      Mouth/Throat:      Mouth: Mucous membranes are moist.      Pharynx: No oropharyngeal exudate or posterior oropharyngeal erythema.   Cardiovascular:      Rate and Rhythm: Normal rate and regular rhythm.      Pulses: Normal pulses.      Heart sounds: Normal heart sounds. No murmur heard.  Pulmonary:      Effort: Pulmonary effort is normal. No respiratory distress.      Breath sounds: Normal breath sounds.   Abdominal:      Tenderness: There is abdominal tenderness.   Musculoskeletal:         General: No swelling or tenderness. Normal range of motion.   Skin:     General: Skin is warm and dry.   Neurological:      General: No focal deficit present.      Mental Status: She is alert and oriented to person, place, and time.   Psychiatric:         Mood and Affect: Mood normal.         Laboratory:  Recent Labs     08/19/23 0256   WBC 10.0   RBC 6.10*   HEMOGLOBIN 17.8*   HEMATOCRIT 53.7*   MCV 88.0   MCH 29.2   MCHC 33.1   RDW 44.5   PLATELETCT 164   MPV 11.7     Recent Labs     08/19/23 0256   SODIUM 139   POTASSIUM 5.6*   CHLORIDE 103   CO2 21   GLUCOSE 140*   BUN 12   CREATININE 1.01   CALCIUM 9.7     Recent Labs     08/19/23 0256   ALTSGPT 70*   ASTSGOT 98*   ALKPHOSPHAT 101*   TBILIRUBIN 0.4   LIPASE 22   GLUCOSE 140*     Recent Labs     08/19/23 0256   INR 1.16*     No results for input(s): \"NTPROBNP\" in the last 72 hours.      No results for input(s): \"TROPONINT\" in the last 72 " hours.    Imaging:  US-PELVIC COMPLETE (TRANSABDOMINAL/TRANSVAGINAL) (COMBO)   Final Result      1.  Stable 3 cm uterine fibroid.   2.  Ovaries were not visualized. Patient requested to stop the transvaginal portion of the exam prior to visualizing the ovaries. Therefore, Doppler evaluation of the ovaries could not be performed.          EKG:  I have personally reviewed the images and compared with prior images. and My impression is: Sinus bradycardia, PAC    Assessment/Plan:  Justification for Admission Status  I anticipate this patient will require at least two midnights for appropriate medical management, necessitating inpatient admission because acetaminophen overdose    * Tylenol overdose, accidental or unintentional, initial encounter- (present on admission)  Assessment & Plan  Patient has been taking a lot of ibuprofen and acetaminophen for pain secondary to endometriosis/uterine fibroid exacerbation  Tylenol level was elevated slightly at 9.  LFTs were elevated AST 98, ALT 70  EDP discussed with poison control recommends acetylcysteine  Case discussed with pharmacy, acetylcysteine ordered and labs ordered for monitoring, CMP and INR every 12 hours  Discontinue acetylcysteine once LFTs normalize    Drug-induced hepatotoxicity- (present on admission)  Assessment & Plan  On presentation LFTs elevated with AST 90s, ALT 70s  Secondary to unintentional acetaminophen overdose  Case was discussed with poison control by EDP who recommends acetylcysteine  Case discussed with pharmacy who is following for acetylcysteine administration.  Continue until LFTs improve  CMP ordered to continue monitoring    Bradycardia- (present on admission)  Assessment & Plan  40s-60s on my evaluation in the ED, asymptomatic  Not on any obvious medications that can be causing this  Unknown baseline.  Continue monitoring    Endometriosis- (present on admission)  Assessment & Plan  History of endometriosis and believes she is in acute  exacerbation now as it is similar to prior episodes  Was on leuprolide in the past but no longer, following with OB/GYN as outpatient    Pelvic pain- (present on admission)  Assessment & Plan  EDP ordered ultrasound to evaluate for ovarian torsion, patient could not tolerate due to pain and refused continuation/reorder of exam  Stable uterine fibroid was seen on the ultrasound  Pain management    Asymptomatic bacteriuria- (present on admission)  Assessment & Plan  Due to lack of symptoms, will not treat with antibiotics    Hyperkalemia- (present on admission)  Assessment & Plan  Slight hyperkalemia 5.6, IV fluids ordered  Repeat CMP ordered to continue monitoring

## 2023-08-19 NOTE — ED NOTES
Labs collected and sent. Pt resting in bed, monitor connected, side rails up, call light in reach.

## 2023-08-19 NOTE — PROGRESS NOTES
[facility] Medicine Daily Progress Note    Date of Service  8/19/2023    Chief Complaint  Lizz Storey is a 38 y.o. female admitted 8/19/2023 with accidental Tylenol overdose    Hospital Course  Ms. Lizz Storey is a 38 y.o. female with a PMHx of uterine fibroids, endometriosis, who presented 8/19/2023 with abdominal pain.       Patient comes in complaining of lower abdominal pain associated with nausea/vomiting.  Pain is similar to her chronic recurrent fibroid pain started.  Denies any fever/chills, urinary or bowel symptoms.  She takes a lot of Tylenol and ibuprofen is not sure how much she took will believe she took way too much.     In the ED afebrile, bradycardic, hemodynamically stable.  Labs show potassium 5.6, LFTs elevated but less than 100.  Acetaminophen level at 9.  EDP discussed with poison control who recommends acetylcysteine.  Patient admitted to hospital medicine for management of care.    Per pharmacy protocol, continue Mucomyst.  Continue to monitor liver function every 12 hours.    Interval Problem Update  -Patient seen and examined.  Currently, patient did not appear to be in distress.  She denies any nausea or vomiting.  Noted mild abdominal tenderness on examination.  Discussed with patient we will continue Mucomyst to reverse increased levels of Tylenol  -Plan of care: Continue Mucomyst with end time at 0200 tomorrow morning; monitor liver function; utilize antiemetics for nausea and vomiting  -Disposition: Anticipated to stay overnight until Mucomyst infusion has been done and liver function has trended down  -Lab work: Reviewed; expected  -VSS at this time    I have discussed this patient's plan of care and discharge plan at IDT rounds today with Case Management, Nursing, Nursing leadership, and other members of the IDT team.    Consultants/Specialty  NONE    Code Status  Full Code    Disposition  The patient is not medically cleared for discharge to home or a post-acute  facility.  Anticipate discharge to: home with close outpatient follow-up    I have placed the appropriate orders for post-discharge needs.    Review of Systems  Review of Systems   Constitutional:  Positive for malaise/fatigue. Negative for chills and fever.   HENT: Negative.     Eyes: Negative.    Respiratory: Negative.     Cardiovascular: Negative.    Gastrointestinal:  Positive for abdominal pain, nausea and vomiting.   Musculoskeletal: Negative.    Skin: Negative.    Neurological: Negative.    Endo/Heme/Allergies: Negative.    Psychiatric/Behavioral:  The patient is nervous/anxious.         Physical Exam  Temp:  [36.7 °C (98 °F)] 36.7 °C (98 °F)  Pulse:  [38-60] 49  Resp:  [0-22] 15  BP: (118-151)/(55-79) 128/61  SpO2:  [91 %-99 %] 94 %    Physical Exam  Vitals and nursing note reviewed.   Constitutional:       Appearance: She is obese.   HENT:      Head: Normocephalic.      Nose: Nose normal.      Mouth/Throat:      Mouth: Mucous membranes are moist.      Pharynx: Oropharynx is clear.   Eyes:      Pupils: Pupils are equal, round, and reactive to light.   Cardiovascular:      Rate and Rhythm: Regular rhythm. Tachycardia present.      Pulses: Normal pulses.      Heart sounds: Normal heart sounds.   Pulmonary:      Effort: Pulmonary effort is normal.      Breath sounds: Normal breath sounds.   Abdominal:      General: Bowel sounds are normal.      Palpations: Abdomen is soft.   Musculoskeletal:         General: Tenderness present.      Cervical back: Normal range of motion and neck supple.   Skin:     General: Skin is dry.      Capillary Refill: Capillary refill takes 2 to 3 seconds.   Neurological:      Mental Status: She is alert. Mental status is at baseline.      Motor: Weakness present.         Fluids  No intake or output data in the 24 hours ending 08/19/23 1014    Laboratory  Recent Labs     08/19/23  0256   WBC 10.0   RBC 6.10*   HEMOGLOBIN 17.8*   HEMATOCRIT 53.7*   MCV 88.0   MCH 29.2   MCHC 33.1   RDW  44.5   PLATELETCT 164   MPV 11.7     Recent Labs     08/19/23  0256 08/19/23  0754   SODIUM 139 137   POTASSIUM 5.6* 3.9   CHLORIDE 103 103   CO2 21 20   GLUCOSE 140* 155*   BUN 12 11   CREATININE 1.01 0.78   CALCIUM 9.7 8.6     Recent Labs     08/19/23  0256   INR 1.16*               Imaging  US-PELVIC COMPLETE (TRANSABDOMINAL/TRANSVAGINAL) (COMBO)   Final Result      1.  Stable 3 cm uterine fibroid.   2.  Ovaries were not visualized. Patient requested to stop the transvaginal portion of the exam prior to visualizing the ovaries. Therefore, Doppler evaluation of the ovaries could not be performed.           Assessment/Plan  * Tylenol overdose, accidental or unintentional, initial encounter- (present on admission)  Assessment & Plan  Patient has been taking a lot of ibuprofen and acetaminophen for pain secondary to endometriosis/uterine fibroid exacerbation  Tylenol level was elevated slightly at 9.  LFTs were elevated AST 98, ALT 70  EDP discussed with poison control recommends acetylcysteine  Case discussed with pharmacy, acetylcysteine ordered and labs ordered for monitoring, CMP and INR every 12 hours  Discontinue acetylcysteine once LFTs normalize  Continue Mucomyst infusion until 0200    Drug-induced hepatotoxicity- (present on admission)  Assessment & Plan  On presentation LFTs elevated with AST 90s, ALT 70s  Secondary to unintentional acetaminophen overdose  Case was discussed with poison control by EDP who recommends acetylcysteine  Case discussed with pharmacy who is following for acetylcysteine administration.  Continue until LFTs improve  CMP ordered to continue monitoring    Bradycardia- (present on admission)  Assessment & Plan  40s-60s on my evaluation in the ED, asymptomatic  Not on any obvious medications that can be causing this  Unknown baseline.  Continue monitoring    Asymptomatic bacteriuria- (present on admission)  Assessment & Plan  Due to lack of symptoms, will not treat with  antibiotics    Hyperkalemia- (present on admission)  Assessment & Plan  Slight hyperkalemia 5.6, IV fluids ordered  Repeat CMP ordered to continue monitoring    Endometriosis- (present on admission)  Assessment & Plan  History of endometriosis and believes she is in acute exacerbation now as it is similar to prior episodes  Was on leuprolide in the past but no longer, following with OB/GYN as outpatient    Pelvic pain- (present on admission)  Assessment & Plan  EDP ordered ultrasound to evaluate for ovarian torsion, patient could not tolerate due to pain and refused continuation/reorder of exam  Stable uterine fibroid was seen on the ultrasound  Pain management         VTE prophylaxis:   SCDs/TEDs      I have performed a physical exam and reviewed and updated ROS and Plan today (8/19/2023). In review of yesterday's note (8/18/2023), there are no changes except as documented above.

## 2023-08-19 NOTE — ED NOTES
Med Rec complete per Pt at bedside.  Allergies reviewed.  Home Pharmacy:  [facility]

## 2023-08-19 NOTE — ASSESSMENT & PLAN NOTE
On presentation LFTs elevated with AST 90s, ALT 70s  Secondary to unintentional acetaminophen overdose  Case was discussed with poison control by EDP who recommends acetylcysteine  Case discussed with pharmacy, acetylcysteine protocol complete  Lft now normal and tylenol level decreasing  Continue supportive care  Repeat bmp in am

## 2023-08-19 NOTE — ED PROVIDER NOTES
ED Provider Note    CHIEF COMPLAINT  Chief Complaint   Patient presents with    Abdominal Pain     The pt reports lower abd pain yesterday, states hx of uterine fibroids. Pain is non-radiating, sharp, 9/10. The pt reports vomiting for the last 4 hours, denies bloody vomit. The pt also reports possible overdose of tylenol and ibuprofen, states that she has been alternating meds but taking 6 pills a time for the last 1.5 days.    N/V       EXTERNAL RECORDS REVIEWED  Outpatient Notes patient was seen in the emergency department on 4/4/2021 for abdominal pain, nausea, dysuria.  At that time had labs that were normal, pelvic ultrasound that demonstrated a fundal like mass likely representative of a fibroid.  She was noted to have had multiple CT scans.    HPI/ROS  LIMITATION TO HISTORY   Select: : None      Lizz Storey is a 38 y.o. female who presents to the emergency department for evaluation of left lower quadrant abdominal pain, nausea and vomiting.  Patient reports symptoms for the last 3 days that increase significantly over the last 4 hours.  She reports vomiting for the last 4 hours as well.  She denies hematemesis.  She states that this feels similar to prior exacerbations of her endometriosis.  She denies dysuria, increased frequency, hematuria.  She reports a normal bowel movement earlier in the day.    PAST MEDICAL HISTORY   has a past medical history of ASTHMA, Cyst of ovary, left, Endometriosis, Fibroid, uterus, Genital herpes in women, HPV in female, Kidney infection, and Psychiatric disorder.    SURGICAL HISTORY   has a past surgical history that includes jean by laparoscopy; laparoscopy; other abdominal surgery; and appendectomy.    FAMILY HISTORY  No family history on file.    SOCIAL HISTORY  Social History     Tobacco Use    Smoking status: Former     Packs/day: 0.25     Years: 15.00     Additional pack years: 0.00     Total pack years: 3.75     Types: Cigarettes    Smokeless tobacco: Never     S/w pt, EMG of right arm scheduled for 5/17/21 arrival 0945 for right arm (first 3 digits numb) ordered by Dr. Demetria Arango (ortho surgeon in [place]).   Pt to bring order, ID, insurance card, med list.  Discussed no lotions/oils on arm prior to test. "Tobacco comments:     1.5 years   Vaping Use    Vaping Use: Never used   Substance and Sexual Activity    Alcohol use: No     Comment: 2 times per year    Drug use: Yes     Types: Inhaled, Marijuana     Comment: marijuana 2 x per month    Sexual activity: Not Currently       CURRENT MEDICATIONS  Home Medications       Reviewed by Parag Suarez R.N. (Registered Nurse) on 08/19/23 at 0138  Med List Status: Partial     Medication Last Dose Status   acyclovir (ZOVIRAX) 400 MG tablet  Active   albuterol 108 (90 Base) MCG/ACT Aero Soln inhalation aerosol  Active   budesonide-formoterol (SYMBICORT) 80-4.5 MCG/ACT Aerosol  Active   fluconazole (DIFLUCAN) 150 MG tablet  Active   ibuprofen (MOTRIN) 800 MG Tab  Active   ipratropium-albuterol (DUONEB) 0.5-2.5 (3) MG/3ML nebulizer solution  Active   norethindrone (AYGESTIN) 5 MG tablet  Active   ondansetron (ZOFRAN ODT) 4 MG TABLET DISPERSIBLE  Active   promethazine (PHENERGAN) 25 MG Tab  Active   traZODone (DESYREL) 50 MG Tab  Active   valacyclovir (VALTREX) 1 GM Tab  Active                    ALLERGIES  Allergies   Allergen Reactions    Carrot [Daucus Carota] Anaphylaxis     Only raw carrot; cooked carrot ok.    Ciprofloxacin Hives    Keflex      Mild hand swelling    Ketorolac Tromethamine Rash    Morphine Hives    Penicillins Rash       PHYSICAL EXAM  VITAL SIGNS: /55   Pulse (!) 40   Temp 36.7 °C (98 °F) (Temporal)   Resp (!) 0   Ht 1.626 m (5' 4\")   Wt 81.3 kg (179 lb 3.7 oz)   SpO2 95%   BMI 30.77 kg/m²    Constitutional: Uncomfortable appearing, actively retching into a bag  HEENT: Atraumatic, normocephalic, pupils are equal round reactive to light, nose normal, mouth shows moist mucous membranes  Neck: Supple, no JVD, no tracheal deviation  Cardiovascular: Bradycardic, regular, no murmur, rub or gallop, 2+ pulses peripherally x4  Thorax & Lungs: No respiratory distress, no wheezes, rales or rhonchi, no chest wall tenderness.  GI: Soft, non-distended, " tenderness with palpation of the left lower quadrant with guarding, no rebound  Skin: Warm, dry, no acute rash or lesion  Musculoskeletal: Moving all extremities, no acute deformity, no edema, no tenderness  Neurologic: A&Ox3, at baseline mentation, cranial nerves II through XII are grossly intact, no sensory deficit, no ataxia  Psychiatric: Appropriate affect for situation at this time        DIAGNOSTIC STUDIES / PROCEDURES  EKG  I have independently interpreted this EKG  Results for orders placed or performed during the hospital encounter of 23   EKG   Result Value Ref Range    Report       [facility] Emergency Dept.    Test Date:  2023  Pt Name:    JONO CUENCA               Department: ER  MRN:        [id number]                      Room:       [facility]  Gender:     Female                       Technician: [id number]  :        1985                   Requested By:GIUSEPPE CARTY  Order #:    [id number]                    Reading MD: Giuseppe Carty    Measurements  Intervals                                Axis  Rate:       51                           P:          80  AZ:         166                          QRS:        65  QRSD:       84                           T:          63  QT:         488  QTc:        450    Interpretive Statements  EKG interpretation: Sinus arrhythmia at a rate of 51, normal axis, normal  intervals, no ST elevation or depression, no T wave inversion.  Impression  sinus arrhythmia.  Electronically Signed On 2023 06:55:41 PDT by Giuseppe Carty           LABS  Labs Reviewed   CBC WITH DIFFERENTIAL - Abnormal; Notable for the following components:       Result Value    RBC 6.10 (*)     Hemoglobin 17.8 (*)     Hematocrit 53.7 (*)     Neutrophils-Polys 81.50 (*)     Lymphocytes 13.80 (*)     Neutrophils (Absolute) 8.13 (*)     All other components within normal limits   COMP METABOLIC PANEL - Abnormal; Notable for the following components:    Potassium 5.6  (*)     Glucose 140 (*)     AST(SGOT) 98 (*)     ALT(SGPT) 70 (*)     Alkaline Phosphatase 101 (*)     All other components within normal limits   URINALYSIS,CULTURE IF INDICATED - Abnormal; Notable for the following components:    Character Cloudy (*)     Glucose 100 (*)     Ketones 40 (*)     Protein 100 (*)     Leukocyte Esterase Moderate (*)     All other components within normal limits    Narrative:     Indication for culture:->Patient WITHOUT an indwelling Noriega  catheter in place with new onset of Dysuria, Frequency,  Urgency, and/or Suprapubic pain   URINE MICROSCOPIC (W/UA) - Abnormal; Notable for the following components:    WBC Packed (*)     RBC 5-10 (*)     Bacteria Few (*)     Epithelial Cells Many (*)     All other components within normal limits    Narrative:     Indication for culture:->Patient WITHOUT an indwelling Noriega  catheter in place with new onset of Dysuria, Frequency,  Urgency, and/or Suprapubic pain   ACETAMINOPHEN - Abnormal; Notable for the following components:    Acetaminophen -Tylenol 9.0 (*)     All other components within normal limits   PROTHROMBIN TIME - Abnormal; Notable for the following components:    PT 14.7 (*)     INR 1.16 (*)     All other components within normal limits    Narrative:     Indicate which anticoagulants the patient is on:->NONE   SALICYLATE - Abnormal; Notable for the following components:    Salicylates, Quant. <1.0 (*)     All other components within normal limits   LIPASE   HCG QUAL SERUM   MAGNESIUM   PHOSPHORUS   IMMATURE PLT FRACTION   ESTIMATED GFR   COMP METABOLIC PANEL   COMP METABOLIC PANEL   URINE DRUG SCREEN         RADIOLOGY  I have independently interpreted the diagnostic imaging associated with this visit and am waiting the final reading from the radiologist.   My preliminary interpretation is as follows: Cannot visualize ovaries.    Radiologist interpretation:   US-PELVIC COMPLETE (TRANSABDOMINAL/TRANSVAGINAL) (COMBO)   Final Result      1.   Stable 3 cm uterine fibroid.   2.  Ovaries were not visualized. Patient requested to stop the transvaginal portion of the exam prior to visualizing the ovaries. Therefore, Doppler evaluation of the ovaries could not be performed.            COURSE & MEDICAL DECISION MAKING    ED Observation Status? Yes; I am placing the patient in to an observation status due to a diagnostic uncertainty as well as therapeutic intensity. Patient placed in observation status at 2:00 AM, 8/19/2023.     Observation plan is as follows: Labs, ultrasound, symptom control and reevaluation    Upon Reevaluation, the patient's condition has: not improved; and will be escalated to hospitalization.    Patient discharged from ED Observation status at 5:00 AM (Time) 8/19/2023 (Date).     INITIAL ASSESSMENT, COURSE AND PLAN  Care Narrative:     Patient with a history of endometriosis, fibroids, lower abdominal pain presenting for evaluation of lower abdominal pain, nausea vomiting.  Differential includes ovarian torsion, ovarian cyst with rupture, acute blood loss anemia, pregnancy with ectopic, BAYLEE, electrolyte abnormality.  Plan to obtain labs, urinalysis, all transvaginal ultrasound to further evaluate for the above.  Will medicate with Dilaudid, Zofran and IV fluids for symptom.    Patient's work-up remarkable for elevation in hemoglobin and hematocrit, suspect hemoconcentration in the setting of dehydration for which she is receiving IV fluids.  Urinalysis with many epithelial cells, highly contaminated and therefore not overtly useful in the setting of no urinary symptoms.  Complete metabolic panel with slightly elevated potassium though hemolyzed.  Interestingly she does have slight elevation in transaminases and alkaline phosphatase.  She has no right upper quadrant tenderness to suggest cholestasis and has a history of prior cholecystectomy.  Given her concern for Tylenol overuse a Tylenol level was obtained and poison control contacted.   Further history obtained from patient includes that she was taking 5-6 200 mg tablets every 6-8 hours for the last 3 days.  I do not feel that this is enough to cause clinically significant liver injury.  Unfortunately patient was not able to tolerate completion of her pelvic ultrasound however she does report that her pain is resolved.  I discussed with her retrying ultrasound versus obtaining CT scan to screen but she declined both at this time stating she would prefer not to have further radiation and stating that her pain is highly consistent with prior flares of her endometriosis.  She understands that undiagnosed ovarian pathology such as torsion could result in loss of her ovary which she would prefer to hold at this time.  Will discuss with poison control, provide additional Zofran and p.o. challenge.     Case discussed with poison control.  Recommendation is to initiate IV N-acetylcysteine until Tylenol level peaks, LFTs stabilize, INR stabilizes on serial repeats. N-acetylcysteine ordered.    Plan of care discussed with patient who will be admitted to the hospital.    Case discussed with Dr. Jacobs, hospitalist will admit the patient to the hospital.    HYDRATION: Based on the patient's presentation of Acute Vomiting and Inability to take oral fluids the patient was given IV fluids. IV Hydration was used because oral hydration was not adequate alone. Upon recheck following hydration, the patient was improvement in symptoms and hydration.      ADDITIONAL PROBLEM LIST  Endometriosis, transaminitis, unintentional chronic Tylenol overdose    DISPOSITION AND DISCUSSIONS  I have discussed management of the patient with the following physicians and QUAN's: Dr. Jacobs, hospitalist    Discussion of management with other [facility] or appropriate source(s): Poison control       FINAL DIAGNOSIS  1. Accidental acetaminophen overdose, initial encounter    2. Endometriosis    3. Nausea and vomiting, unspecified vomiting type            Electronically signed by: Benjamin Carty M.D., 8/19/2023 2:00 AM

## 2023-08-19 NOTE — ED NOTES
Pt ambulatory to bathroom and back to room, gate steady, pt reconnected to monitor. Iv fluids reconnected.

## 2023-08-19 NOTE — HOSPITAL COURSE
Ms. Lizz Storey is a 38 y.o. female with a PMHx of uterine fibroids, endometriosis, who presented 8/19/2023 with abdominal pain.       Patient comes in complaining of lower abdominal pain associated with nausea/vomiting.  Pain is similar to her chronic recurrent fibroid pain started.  Denies any fever/chills, urinary or bowel symptoms.  She takes a lot of Tylenol and ibuprofen is not sure how much she took will believe she took way too much.     In the ED afebrile, bradycardic, hemodynamically stable.  Labs show potassium 5.6, LFTs elevated but less than 100.  Acetaminophen level at 9.  EDP discussed with poison control who recommends acetylcysteine.  Patient admitted to hospital medicine for management of care.    Per pharmacy protocol, continue Mucomyst.  Continue to monitor liver function every 12 hours.

## 2023-08-20 PROBLEM — E87.6 HYPOKALEMIA: Status: ACTIVE | Noted: 2023-08-20

## 2023-08-20 PROBLEM — R00.1 BRADYCARDIA: Status: RESOLVED | Noted: 2023-08-19 | Resolved: 2023-08-20

## 2023-08-20 LAB
ANION GAP SERPL CALC-SCNC: 8 MMOL/L (ref 7–16)
BASOPHILS # BLD AUTO: 0.1 % (ref 0–1.8)
BASOPHILS # BLD: 0.01 K/UL (ref 0–0.12)
BUN SERPL-MCNC: 8 MG/DL (ref 8–22)
CALCIUM SERPL-MCNC: 8.3 MG/DL (ref 8.5–10.5)
CHLORIDE SERPL-SCNC: 106 MMOL/L (ref 96–112)
CO2 SERPL-SCNC: 22 MMOL/L (ref 20–33)
CREAT SERPL-MCNC: 0.62 MG/DL (ref 0.5–1.4)
EOSINOPHIL # BLD AUTO: 0 K/UL (ref 0–0.51)
EOSINOPHIL NFR BLD: 0 % (ref 0–6.9)
ERYTHROCYTE [DISTWIDTH] IN BLOOD BY AUTOMATED COUNT: 43.8 FL (ref 35.9–50)
GFR SERPLBLD CREATININE-BSD FMLA CKD-EPI: 116 ML/MIN/1.73 M 2
GLUCOSE SERPL-MCNC: 101 MG/DL (ref 65–99)
HCT VFR BLD AUTO: 42.7 % (ref 37–47)
HGB BLD-MCNC: 14.2 G/DL (ref 12–16)
IMM GRANULOCYTES # BLD AUTO: 0.02 K/UL (ref 0–0.11)
IMM GRANULOCYTES NFR BLD AUTO: 0.2 % (ref 0–0.9)
INR PPP: 1.21 (ref 0.87–1.13)
LYMPHOCYTES # BLD AUTO: 1.95 K/UL (ref 1–4.8)
LYMPHOCYTES NFR BLD: 22.3 % (ref 22–41)
MCH RBC QN AUTO: 29 PG (ref 27–33)
MCHC RBC AUTO-ENTMCNC: 33.3 G/DL (ref 32.2–35.5)
MCV RBC AUTO: 87.3 FL (ref 81.4–97.8)
MONOCYTES # BLD AUTO: 0.44 K/UL (ref 0–0.85)
MONOCYTES NFR BLD AUTO: 5 % (ref 0–13.4)
NEUTROPHILS # BLD AUTO: 6.33 K/UL (ref 1.82–7.42)
NEUTROPHILS NFR BLD: 72.4 % (ref 44–72)
NRBC # BLD AUTO: 0 K/UL
NRBC BLD-RTO: 0 /100 WBC (ref 0–0.2)
PLATELET # BLD AUTO: 156 K/UL (ref 164–446)
PMV BLD AUTO: 11.2 FL (ref 9–12.9)
POTASSIUM SERPL-SCNC: 3.2 MMOL/L (ref 3.6–5.5)
PROTHROMBIN TIME: 15.1 SEC (ref 12–14.6)
RBC # BLD AUTO: 4.89 M/UL (ref 4.2–5.4)
SODIUM SERPL-SCNC: 136 MMOL/L (ref 135–145)
WBC # BLD AUTO: 8.8 K/UL (ref 4.8–10.8)

## 2023-08-20 PROCEDURE — 700102 HCHG RX REV CODE 250 W/ 637 OVERRIDE(OP): Performed by: STUDENT IN AN ORGANIZED HEALTH CARE EDUCATION/TRAINING PROGRAM

## 2023-08-20 PROCEDURE — 80048 BASIC METABOLIC PNL TOTAL CA: CPT

## 2023-08-20 PROCEDURE — 85025 COMPLETE CBC W/AUTO DIFF WBC: CPT

## 2023-08-20 PROCEDURE — 770006 HCHG ROOM/CARE - MED/SURG/GYN SEMI*

## 2023-08-20 PROCEDURE — 85610 PROTHROMBIN TIME: CPT

## 2023-08-20 PROCEDURE — 700102 HCHG RX REV CODE 250 W/ 637 OVERRIDE(OP): Performed by: HOSPITALIST

## 2023-08-20 PROCEDURE — 36415 COLL VENOUS BLD VENIPUNCTURE: CPT

## 2023-08-20 PROCEDURE — 99233 SBSQ HOSP IP/OBS HIGH 50: CPT | Performed by: HOSPITALIST

## 2023-08-20 PROCEDURE — A9270 NON-COVERED ITEM OR SERVICE: HCPCS | Performed by: HOSPITALIST

## 2023-08-20 PROCEDURE — 700111 HCHG RX REV CODE 636 W/ 250 OVERRIDE (IP): Performed by: STUDENT IN AN ORGANIZED HEALTH CARE EDUCATION/TRAINING PROGRAM

## 2023-08-20 PROCEDURE — A9270 NON-COVERED ITEM OR SERVICE: HCPCS | Performed by: STUDENT IN AN ORGANIZED HEALTH CARE EDUCATION/TRAINING PROGRAM

## 2023-08-20 PROCEDURE — 700101 HCHG RX REV CODE 250: Performed by: HOSPITALIST

## 2023-08-20 RX ORDER — SODIUM CHLORIDE AND POTASSIUM CHLORIDE 150; 900 MG/100ML; MG/100ML
INJECTION, SOLUTION INTRAVENOUS CONTINUOUS
Status: DISCONTINUED | OUTPATIENT
Start: 2023-08-20 | End: 2023-08-21 | Stop reason: HOSPADM

## 2023-08-20 RX ORDER — SCOLOPAMINE TRANSDERMAL SYSTEM 1 MG/1
1 PATCH, EXTENDED RELEASE TRANSDERMAL
Status: DISCONTINUED | OUTPATIENT
Start: 2023-08-20 | End: 2023-08-21 | Stop reason: HOSPADM

## 2023-08-20 RX ORDER — POTASSIUM CHLORIDE 20 MEQ/1
20 TABLET, EXTENDED RELEASE ORAL ONCE
Status: ACTIVE | OUTPATIENT
Start: 2023-08-20 | End: 2023-08-21

## 2023-08-20 RX ORDER — LIDOCAINE 50 MG/G
1 PATCH TOPICAL EVERY 24 HOURS
Status: DISCONTINUED | OUTPATIENT
Start: 2023-08-20 | End: 2023-08-21 | Stop reason: HOSPADM

## 2023-08-20 RX ADMIN — HYDROMORPHONE HYDROCHLORIDE 0.5 MG: 1 INJECTION, SOLUTION INTRAMUSCULAR; INTRAVENOUS; SUBCUTANEOUS at 05:59

## 2023-08-20 RX ADMIN — ONDANSETRON 4 MG: 2 INJECTION INTRAMUSCULAR; INTRAVENOUS at 05:59

## 2023-08-20 RX ADMIN — HYDROMORPHONE HYDROCHLORIDE 0.5 MG: 1 INJECTION, SOLUTION INTRAMUSCULAR; INTRAVENOUS; SUBCUTANEOUS at 01:54

## 2023-08-20 RX ADMIN — HYDROMORPHONE HYDROCHLORIDE 0.5 MG: 1 INJECTION, SOLUTION INTRAMUSCULAR; INTRAVENOUS; SUBCUTANEOUS at 11:19

## 2023-08-20 RX ADMIN — HYDROMORPHONE HYDROCHLORIDE 0.5 MG: 1 INJECTION, SOLUTION INTRAMUSCULAR; INTRAVENOUS; SUBCUTANEOUS at 15:46

## 2023-08-20 RX ADMIN — ONDANSETRON 4 MG: 2 INJECTION INTRAMUSCULAR; INTRAVENOUS at 01:54

## 2023-08-20 RX ADMIN — SCOPOLAMINE 1 PATCH: 1.5 PATCH, EXTENDED RELEASE TRANSDERMAL at 11:22

## 2023-08-20 RX ADMIN — ONDANSETRON 4 MG: 2 INJECTION INTRAMUSCULAR; INTRAVENOUS at 15:46

## 2023-08-20 RX ADMIN — PROCHLORPERAZINE EDISYLATE 5 MG: 5 INJECTION, SOLUTION INTRAMUSCULAR; INTRAVENOUS at 22:21

## 2023-08-20 RX ADMIN — PROCHLORPERAZINE EDISYLATE 10 MG: 5 INJECTION, SOLUTION INTRAMUSCULAR; INTRAVENOUS at 13:25

## 2023-08-20 RX ADMIN — HYDROMORPHONE HYDROCHLORIDE 0.5 MG: 1 INJECTION, SOLUTION INTRAMUSCULAR; INTRAVENOUS; SUBCUTANEOUS at 19:55

## 2023-08-20 RX ADMIN — POTASSIUM CHLORIDE AND SODIUM CHLORIDE: 900; 150 INJECTION, SOLUTION INTRAVENOUS at 13:18

## 2023-08-20 RX ADMIN — SENNOSIDES AND DOCUSATE SODIUM 2 TABLET: 50; 8.6 TABLET ORAL at 06:00

## 2023-08-20 RX ADMIN — PROCHLORPERAZINE EDISYLATE 10 MG: 5 INJECTION, SOLUTION INTRAMUSCULAR; INTRAVENOUS at 18:28

## 2023-08-20 RX ADMIN — ONDANSETRON 4 MG: 4 TABLET, ORALLY DISINTEGRATING ORAL at 11:23

## 2023-08-20 RX ADMIN — ONDANSETRON 4 MG: 2 INJECTION INTRAMUSCULAR; INTRAVENOUS at 19:56

## 2023-08-20 RX ADMIN — LIDOCAINE 1 PATCH: 50 PATCH TOPICAL at 10:03

## 2023-08-20 ASSESSMENT — PAIN DESCRIPTION - PAIN TYPE
TYPE: ACUTE PAIN

## 2023-08-20 ASSESSMENT — ENCOUNTER SYMPTOMS
CHILLS: 0
ABDOMINAL PAIN: 1
EYES NEGATIVE: 1
CARDIOVASCULAR NEGATIVE: 1
FEVER: 0
MUSCULOSKELETAL NEGATIVE: 1
VOMITING: 0
RESPIRATORY NEGATIVE: 1
NAUSEA: 1
NERVOUS/ANXIOUS: 1
NEUROLOGICAL NEGATIVE: 1

## 2023-08-20 NOTE — PROGRESS NOTES
CNA informed RN that Pt's temp is 100.4F.  Pt verbalized that she feels hot intermittently.  Ice packs provided.

## 2023-08-20 NOTE — PROGRESS NOTES
"[facility] Medicine Daily Progress Note    Date of Service  8/21/2023    Chief Complaint  Lizz Storey is a 38 y.o. female admitted 8/19/2023 with accidental Tylenol overdose    Hospital Course  Ms. Lizz Storey is a 38 y.o. female with a history of uterine fibroids and endometriosis. She presented to [facility] on 8/19/2023 with abdominal pain with associated with nausea/vomiting.  Pain is similar to her chronic recurrent fibroid pain started.  Denies any fever/chills, urinary or bowel symptoms. She takes a lot of Tylenol and ibuprofen is not sure how much she took will believe she took way too much.     In the ED afebrile, bradycardic, hemodynamically stable.  Labs show potassium 5.6, LFTs elevated but less than 100.  Acetaminophen level at 9.  EDP discussed with poison control who recommends acetylcysteine.  Patient admitted to [facility] medicine for management of care.    Per pharmacy protocol she completed a course of mucomyst    Interval Problem Update  Axox3, she states the overdose was unintentional and she states she has no desire to harm herself, she reports she is relieved that her liver function improved. Reports pain is \"mainly from my L ovary\", states she has a follow up appointment with her ob/ gyn next week to address this. Currently abdominal pain is L LQ and 4/10, non radiating, she reports significant nausea this am, no emesis, ROS otherwise negative, voiding b/b, vitals stable    I have discussed this patient's plan of care and discharge plan at IDT rounds today with Case Management, Nursing, Nursing leadership, and other members of the IDT team.    Consultants/Specialty  NONE    Code Status  Full Code    Disposition  The patient is not medically cleared for discharge to home or a post-acute facility.      I have placed the appropriate orders for post-discharge needs.    Review of Systems  Review of Systems   Constitutional:  Positive for malaise/fatigue. Negative for chills and fever. "   HENT: Negative.     Eyes: Negative.    Respiratory: Negative.     Cardiovascular: Negative.    Gastrointestinal:  Positive for abdominal pain and nausea. Negative for vomiting.   Musculoskeletal: Negative.    Skin: Negative.    Neurological: Negative.    Endo/Heme/Allergies: Negative.    Psychiatric/Behavioral:  The patient is nervous/anxious.         Physical Exam  Temp:  [36.1 °C (97 °F)-37.5 °C (99.5 °F)] 37.1 °C (98.8 °F)  Pulse:  [45-52] 47  Resp:  [16-18] 18  BP: (117-128)/(66-76) 128/71  SpO2:  [91 %-93 %] 93 %    Physical Exam  Vitals and nursing note reviewed.   Constitutional:       Appearance: She is obese.   HENT:      Head: Normocephalic.      Nose: Nose normal.      Mouth/Throat:      Mouth: Mucous membranes are moist.      Pharynx: Oropharynx is clear.   Eyes:      Pupils: Pupils are equal, round, and reactive to light.   Cardiovascular:      Rate and Rhythm: Regular rhythm.      Pulses: Normal pulses.      Heart sounds: Normal heart sounds.   Pulmonary:      Effort: Pulmonary effort is normal.      Breath sounds: Normal breath sounds.   Abdominal:      General: Bowel sounds are normal.      Palpations: Abdomen is soft.      Comments: Mild L LQ tenderness to palpation, no guarding or rebound   Musculoskeletal:         General: Tenderness present.      Cervical back: Normal range of motion and neck supple.   Skin:     General: Skin is dry.      Capillary Refill: Capillary refill takes 2 to 3 seconds.   Neurological:      Mental Status: She is alert. Mental status is at baseline.      Motor: Weakness present.         Fluids    Intake/Output Summary (Last 24 hours) at 8/21/2023 0747  Last data filed at 8/21/2023 0210  Gross per 24 hour   Intake 480 ml   Output 0 ml   Net 480 ml       Laboratory  Recent Labs     08/19/23  0256 08/20/23  0253 08/21/23  0420   WBC 10.0 8.8 8.5   RBC 6.10* 4.89 4.91   HEMOGLOBIN 17.8* 14.2 14.5   HEMATOCRIT 53.7* 42.7 42.3   MCV 88.0 87.3 86.2   MCH 29.2 29.0 29.5   MCHC  33.1 33.3 34.3   RDW 44.5 43.8 42.1   PLATELETCT 164 156* 149*   MPV 11.7 11.2 11.0     Recent Labs     08/19/23  2032 08/20/23  0253 08/21/23  0251   SODIUM 137 136 140   POTASSIUM 3.4* 3.2* 3.6   CHLORIDE 105 106 103   CO2 19* 22 23   GLUCOSE 106* 101* 87   BUN 8 8 12   CREATININE 0.68 0.62 0.70   CALCIUM 8.7 8.3* 9.1     Recent Labs     08/19/23  1620 08/20/23  0253 08/21/23  0251   INR 1.26* 1.21* 1.05               Imaging  US-PELVIC COMPLETE (TRANSABDOMINAL/TRANSVAGINAL) (COMBO)   Final Result      1.  Stable 3 cm uterine fibroid.   2.  Ovaries were not visualized. Patient requested to stop the transvaginal portion of the exam prior to visualizing the ovaries. Therefore, Doppler evaluation of the ovaries could not be performed.           Assessment/Plan  * Tylenol overdose, accidental or unintentional, initial encounter- (present on admission)  Assessment & Plan  Patient has been taking a lot of ibuprofen and acetaminophen for pain secondary to endometriosis/uterine fibroid exacerbation  Tylenol level was elevated slightly at 9.  LFTs were elevated AST 98, ALT 70  EDP discussed with poison control recommends acetylcysteine  Case discussed with pharmacy, acetylcysteine completed  See above    Hypokalemia  Assessment & Plan  Replacing  Due to emesis  Will check mag  Repeat bmp in am    Drug-induced hepatotoxicity- (present on admission)  Assessment & Plan  On presentation LFTs elevated with AST 90s, ALT 70s  Secondary to unintentional acetaminophen overdose  Case was discussed with poison control by EDP who recommends acetylcysteine  Case discussed with pharmacy, acetylcysteine protocol complete  Lft now normal and tylenol level decreasing  Continue supportive care  Repeat bmp in am    Asymptomatic bacteriuria- (present on admission)  Assessment & Plan  Due to lack of symptoms, will not treat with antibiotics    Hyperkalemia- (present on admission)  Assessment & Plan  Slight hyperkalemia 5.6, IV fluids  ordered  Repeat CMP ordered to continue monitoring    Endometriosis- (present on admission)  Assessment & Plan  History of endometriosis and believes she is in acute exacerbation now as it is similar to prior episodes  Was on leuprolide in the past but no longer, following with OB/GYN as outpatient    Pelvic pain- (present on admission)  Assessment & Plan  EDP ordered ultrasound to evaluate for ovarian torsion, patient could not tolerate due to pain and refused continuation/reorder of exam  Stable uterine fibroid was seen on the ultrasound  Will follow up with ob as planned next week    Asthma in adult without complication- (present on admission)  Assessment & Plan  No evidence of acute exacerbation  Following  RT           I have performed a physical exam and reviewed and updated ROS and Plan today (8/21/2023). In review of yesterday's note (8/20/2023), there are no changes except as documented above.

## 2023-08-20 NOTE — PROGRESS NOTES
4 Eyes Skin Assessment Completed by NITZA Moon and NITZA Snyder.    Head WDL  Ears WDL  Nose WDL  Mouth WDL  Neck WDL  Breast/Chest WDL  Shoulder Blades WDL  Spine WDL  (R) Arm/Elbow/Hand WDL  (L) Arm/Elbow/Hand WDL  Abdomen WDL  Groin WDL  Scrotum/Coccyx/Buttocks WDL  (R) Leg WDL  (L) Leg WDL  (R) Heel/Foot/Toe WDL  (L) Heel/Foot/Toe WDL          Devices In Places PIV      Interventions In Place Pillows and Pressure Redistribution Mattress    Possible Skin Injury No    Pictures Uploaded Into Epic N/A  Wound Consult Placed N/A  RN Wound Prevention Protocol Ordered No

## 2023-08-20 NOTE — CARE PLAN
The patient is Stable - Low risk of patient condition declining or worsening    Shift Goals  Clinical Goals: pain and nausea control    Progress made toward(s) clinical / shift goals:  C/o nausea x 2.  No vomiting noted.  Emesis bag given.  Prn dilaudid given for abdominal pain.  LR running at 100 ml/hr    Problem: Knowledge Deficit - Standard  Goal: Patient and family/care givers will demonstrate understanding of plan of care, disease process/condition, diagnostic tests and medications  Outcome: Progressing     Problem: Pain - Standard  Goal: Alleviation of pain or a reduction in pain to the patient’s comfort goal  Outcome: Progressing       Patient is not progressing towards the following goals:

## 2023-08-21 ENCOUNTER — PHARMACY VISIT (OUTPATIENT)
Dept: PHARMACY | Facility: MEDICAL CENTER | Age: 38
End: 2023-08-21
Payer: COMMERCIAL

## 2023-08-21 VITALS
SYSTOLIC BLOOD PRESSURE: 113 MMHG | DIASTOLIC BLOOD PRESSURE: 66 MMHG | OXYGEN SATURATION: 93 % | TEMPERATURE: 97.7 F | BODY MASS INDEX: 31.43 KG/M2 | HEART RATE: 61 BPM | WEIGHT: 184.08 LBS | HEIGHT: 64 IN | RESPIRATION RATE: 17 BRPM

## 2023-08-21 PROBLEM — T50.905A DRUG-INDUCED HEPATOTOXICITY: Status: RESOLVED | Noted: 2023-08-19 | Resolved: 2023-08-21

## 2023-08-21 PROBLEM — K71.6 DRUG-INDUCED HEPATOTOXICITY: Status: RESOLVED | Noted: 2023-08-19 | Resolved: 2023-08-21

## 2023-08-21 PROBLEM — E87.5 HYPERKALEMIA: Status: RESOLVED | Noted: 2023-08-19 | Resolved: 2023-08-21

## 2023-08-21 PROBLEM — E87.6 HYPOKALEMIA: Status: RESOLVED | Noted: 2023-08-20 | Resolved: 2023-08-21

## 2023-08-21 PROBLEM — R82.71 ASYMPTOMATIC BACTERIURIA: Status: RESOLVED | Noted: 2023-08-19 | Resolved: 2023-08-21

## 2023-08-21 PROBLEM — T39.1X1A TYLENOL OVERDOSE, ACCIDENTAL OR UNINTENTIONAL, INITIAL ENCOUNTER: Status: RESOLVED | Noted: 2023-08-19 | Resolved: 2023-08-21

## 2023-08-21 LAB
ALBUMIN SERPL BCP-MCNC: 4 G/DL (ref 3.2–4.9)
ALBUMIN/GLOB SERPL: 1.6 G/DL
ALP SERPL-CCNC: 74 U/L (ref 30–99)
ALT SERPL-CCNC: 26 U/L (ref 2–50)
ANION GAP SERPL CALC-SCNC: 14 MMOL/L (ref 7–16)
AST SERPL-CCNC: 16 U/L (ref 12–45)
BASOPHILS # BLD AUTO: 0.1 % (ref 0–1.8)
BASOPHILS # BLD: 0.01 K/UL (ref 0–0.12)
BILIRUB SERPL-MCNC: 0.6 MG/DL (ref 0.1–1.5)
BUN SERPL-MCNC: 12 MG/DL (ref 8–22)
CALCIUM ALBUM COR SERPL-MCNC: 9.1 MG/DL (ref 8.5–10.5)
CALCIUM SERPL-MCNC: 9.1 MG/DL (ref 8.5–10.5)
CHLORIDE SERPL-SCNC: 103 MMOL/L (ref 96–112)
CO2 SERPL-SCNC: 23 MMOL/L (ref 20–33)
CREAT SERPL-MCNC: 0.7 MG/DL (ref 0.5–1.4)
EOSINOPHIL # BLD AUTO: 0.01 K/UL (ref 0–0.51)
EOSINOPHIL NFR BLD: 0.1 % (ref 0–6.9)
ERYTHROCYTE [DISTWIDTH] IN BLOOD BY AUTOMATED COUNT: 42.1 FL (ref 35.9–50)
GFR SERPLBLD CREATININE-BSD FMLA CKD-EPI: 113 ML/MIN/1.73 M 2
GLOBULIN SER CALC-MCNC: 2.5 G/DL (ref 1.9–3.5)
GLUCOSE SERPL-MCNC: 87 MG/DL (ref 65–99)
HCT VFR BLD AUTO: 42.3 % (ref 37–47)
HGB BLD-MCNC: 14.5 G/DL (ref 12–16)
IMM GRANULOCYTES # BLD AUTO: 0.02 K/UL (ref 0–0.11)
IMM GRANULOCYTES NFR BLD AUTO: 0.2 % (ref 0–0.9)
INR PPP: 1.05 (ref 0.87–1.13)
LYMPHOCYTES # BLD AUTO: 1.91 K/UL (ref 1–4.8)
LYMPHOCYTES NFR BLD: 22.4 % (ref 22–41)
MCH RBC QN AUTO: 29.5 PG (ref 27–33)
MCHC RBC AUTO-ENTMCNC: 34.3 G/DL (ref 32.2–35.5)
MCV RBC AUTO: 86.2 FL (ref 81.4–97.8)
MONOCYTES # BLD AUTO: 0.44 K/UL (ref 0–0.85)
MONOCYTES NFR BLD AUTO: 5.2 % (ref 0–13.4)
NEUTROPHILS # BLD AUTO: 6.13 K/UL (ref 1.82–7.42)
NEUTROPHILS NFR BLD: 72 % (ref 44–72)
NRBC # BLD AUTO: 0 K/UL
NRBC BLD-RTO: 0 /100 WBC (ref 0–0.2)
PLATELET # BLD AUTO: 149 K/UL (ref 164–446)
PMV BLD AUTO: 11 FL (ref 9–12.9)
POTASSIUM SERPL-SCNC: 3.6 MMOL/L (ref 3.6–5.5)
PROT SERPL-MCNC: 6.5 G/DL (ref 6–8.2)
PROTHROMBIN TIME: 13.6 SEC (ref 12–14.6)
RBC # BLD AUTO: 4.91 M/UL (ref 4.2–5.4)
SODIUM SERPL-SCNC: 140 MMOL/L (ref 135–145)
WBC # BLD AUTO: 8.5 K/UL (ref 4.8–10.8)

## 2023-08-21 PROCEDURE — 85025 COMPLETE CBC W/AUTO DIFF WBC: CPT

## 2023-08-21 PROCEDURE — 700111 HCHG RX REV CODE 636 W/ 250 OVERRIDE (IP): Performed by: STUDENT IN AN ORGANIZED HEALTH CARE EDUCATION/TRAINING PROGRAM

## 2023-08-21 PROCEDURE — 80053 COMPREHEN METABOLIC PANEL: CPT

## 2023-08-21 PROCEDURE — A9270 NON-COVERED ITEM OR SERVICE: HCPCS | Performed by: STUDENT IN AN ORGANIZED HEALTH CARE EDUCATION/TRAINING PROGRAM

## 2023-08-21 PROCEDURE — 85610 PROTHROMBIN TIME: CPT

## 2023-08-21 PROCEDURE — RXMED WILLOW AMBULATORY MEDICATION CHARGE: Performed by: HOSPITALIST

## 2023-08-21 PROCEDURE — 36415 COLL VENOUS BLD VENIPUNCTURE: CPT

## 2023-08-21 PROCEDURE — 700101 HCHG RX REV CODE 250: Performed by: HOSPITALIST

## 2023-08-21 PROCEDURE — 700102 HCHG RX REV CODE 250 W/ 637 OVERRIDE(OP): Performed by: STUDENT IN AN ORGANIZED HEALTH CARE EDUCATION/TRAINING PROGRAM

## 2023-08-21 PROCEDURE — 99239 HOSP IP/OBS DSCHRG MGMT >30: CPT | Performed by: HOSPITALIST

## 2023-08-21 RX ORDER — OXYCODONE HYDROCHLORIDE 5 MG/1
5 TABLET ORAL EVERY 8 HOURS PRN
Status: DISCONTINUED | OUTPATIENT
Start: 2023-08-21 | End: 2023-08-21 | Stop reason: HOSPADM

## 2023-08-21 RX ORDER — PROMETHAZINE HYDROCHLORIDE 12.5 MG/1
12.5-25 SUPPOSITORY RECTAL EVERY 4 HOURS PRN
Qty: 10 SUPPOSITORY | Refills: 0 | Status: SHIPPED | OUTPATIENT
Start: 2023-08-21

## 2023-08-21 RX ORDER — SCOLOPAMINE TRANSDERMAL SYSTEM 1 MG/1
1 PATCH, EXTENDED RELEASE TRANSDERMAL
Qty: 4 PATCH | Refills: 3 | Status: SHIPPED | OUTPATIENT
Start: 2023-08-23

## 2023-08-21 RX ADMIN — HYDROMORPHONE HYDROCHLORIDE 0.5 MG: 1 INJECTION, SOLUTION INTRAMUSCULAR; INTRAVENOUS; SUBCUTANEOUS at 00:10

## 2023-08-21 RX ADMIN — HYDROMORPHONE HYDROCHLORIDE 0.5 MG: 1 INJECTION, SOLUTION INTRAMUSCULAR; INTRAVENOUS; SUBCUTANEOUS at 17:03

## 2023-08-21 RX ADMIN — ONDANSETRON 4 MG: 2 INJECTION INTRAMUSCULAR; INTRAVENOUS at 17:04

## 2023-08-21 RX ADMIN — HYDROMORPHONE HYDROCHLORIDE 0.5 MG: 1 INJECTION, SOLUTION INTRAMUSCULAR; INTRAVENOUS; SUBCUTANEOUS at 04:28

## 2023-08-21 RX ADMIN — LIDOCAINE 1 PATCH: 50 PATCH TOPICAL at 08:58

## 2023-08-21 RX ADMIN — HYDROMORPHONE HYDROCHLORIDE 0.5 MG: 1 INJECTION, SOLUTION INTRAMUSCULAR; INTRAVENOUS; SUBCUTANEOUS at 12:59

## 2023-08-21 RX ADMIN — HYDROMORPHONE HYDROCHLORIDE 0.5 MG: 1 INJECTION, SOLUTION INTRAMUSCULAR; INTRAVENOUS; SUBCUTANEOUS at 08:57

## 2023-08-21 RX ADMIN — PROMETHAZINE HYDROCHLORIDE 25 MG: 25 TABLET ORAL at 03:08

## 2023-08-21 RX ADMIN — ONDANSETRON 4 MG: 2 INJECTION INTRAMUSCULAR; INTRAVENOUS at 00:10

## 2023-08-21 RX ADMIN — ONDANSETRON 4 MG: 2 INJECTION INTRAMUSCULAR; INTRAVENOUS at 08:58

## 2023-08-21 RX ADMIN — ONDANSETRON 4 MG: 2 INJECTION INTRAMUSCULAR; INTRAVENOUS at 12:59

## 2023-08-21 RX ADMIN — PROCHLORPERAZINE EDISYLATE 5 MG: 5 INJECTION, SOLUTION INTRAMUSCULAR; INTRAVENOUS at 02:36

## 2023-08-21 RX ADMIN — POTASSIUM CHLORIDE AND SODIUM CHLORIDE: 900; 150 INJECTION, SOLUTION INTRAVENOUS at 11:47

## 2023-08-21 RX ADMIN — ONDANSETRON 4 MG: 2 INJECTION INTRAMUSCULAR; INTRAVENOUS at 04:24

## 2023-08-21 ASSESSMENT — PAIN DESCRIPTION - PAIN TYPE
TYPE: ACUTE PAIN

## 2023-08-21 NOTE — CARE PLAN
The patient is Stable - Low risk of patient condition declining or worsening    Shift Goals  Clinical Goals: Control nausea and pain  Patient Goals: Control pain  Family Goals: No family at bedside    Problem: Knowledge Deficit - Standard  Goal: Patient and family/care givers will demonstrate understanding of plan of care, disease process/condition, diagnostic tests and medications  Outcome: Progressing  Note: Updated pt POC: Control nausea, pain and continue IV fluids.      Problem: Pain - Standard  Goal: Alleviation of pain or a reduction in pain to the patient’s comfort goal  Outcome: Progressing  Flowsheets (Taken 8/20/2023 6015)  OB Pain Intervention:   Medication - See MAR   [place]   Education   Relaxation technique   Rest  Pain Rating Scale (NPRS): 9  Note: Pt will report pain level < 5 during this shift. Medicated pt per MAR, continue to re-assessed pain via rating scale of 0/10.    Patient is not progressing towards the following goals:

## 2023-08-21 NOTE — PROGRESS NOTES
Assumed pt care at shift change, report received from day RN. Pt A&O x4, pt reports 9/10 pain. Medicated pt per MAR. Updated pt on POC, communication board updated. Bed locked and in lowest position. Call light and belongings within reach. Non-skid socks in place. Needs met, and hourly rounding in place.

## 2023-08-21 NOTE — CARE PLAN
The patient is Stable - Low risk of patient condition declining or worsening    Shift Goals  Clinical Goals: control nausea and pain  Patient Goals: control nausea and pain  Family Goals: RITO    Progress made toward(s) clinical / shift goals:  patient to discharge       Problem: Knowledge Deficit - Standard  Goal: Patient and family/care givers will demonstrate understanding of plan of care, disease process/condition, diagnostic tests and medications  Outcome: Met     Problem: Pain - Standard  Goal: Alleviation of pain or a reduction in pain to the patient’s comfort goal  Outcome: Met     Problem: Fluid Volume  Goal: Fluid volume balance will be maintained  Outcome: Met

## 2023-08-21 NOTE — DISCHARGE SUMMARY
Discharge Summary    CHIEF COMPLAINT ON ADMISSION  Chief Complaint   Patient presents with    Abdominal Pain     The pt reports lower abd pain yesterday, states hx of uterine fibroids. Pain is non-radiating, sharp, 9/10. The pt reports vomiting for the last 4 hours, denies bloody vomit. The pt also reports possible overdose of tylenol and ibuprofen, states that she has been alternating meds but taking 6 pills a time for the last 1.5 days.    N/V       Reason for Admission  Vomiting; Abdominal Pain     Admission Date  8/19/2023    CODE STATUS  Full Code    HPI & HOSPITAL COURSE  Ms. Lizz Storey is a 38 y.o. female with a history of uterine fibroids and endometriosis. She presented to [facility] on 8/19/2023 with abdominal pain with associated with nausea/vomiting.  Pain is similar to her chronic recurrent fibroid pain started.  Denies any fever/chills, urinary or bowel symptoms. She takes a lot of Tylenol and ibuprofen is not sure how much she took will believe she took way too much.     In the ED afebrile, bradycardic, hemodynamically stable.  Labs show potassium 5.6, LFTs elevated but less than 100.  Acetaminophen level at 9.  EDP discussed with poison control who recommends acetylcysteine.  Patient admitted to hospital medicine for management of care.      Per pharmacy protocol she completed a course of mucomyst. Her liver enzymes normalized and her chronic abdominal pain improved. Clinically it is consistent with her known endometriosis and she reports she will follow up with her Ob gyn this thursday to address.  She had some nausea and vomiting, the emesis has now resolved and the nausea is controlled, she is tolerating a soft diet at the time of discharge. Her u tox was positive for methadone and cannabis, she tells me that she forgot to mention that she is on chronic methadone, 38 mg daily per [facility] - she plans to follow up with them today. Some of her nausea and vomiting may have be related  to cannabis use and methadone w/d. This was discussed and recommendations provided.    She agrees to close outpatient follow up and to return to the ER if needed. She also agrees to avoid further tylenol use.     Therefore, she is discharged in fair and stable condition to home with close outpatient follow-up (including follow up with her pcp regarding mild thrombocytopenia)    The patient met 2-midnight criteria for an inpatient stay at the time of discharge.    Discharge Date  8/21/23    FOLLOW UP ITEMS POST DISCHARGE  Pcp  Methadone clinic  Ob gyn    DISCHARGE DIAGNOSES  Principal Problem (Resolved):    Tylenol overdose, accidental or unintentional, initial encounter (POA: Yes)  Active Problems:    Asthma in adult without complication (POA: Yes)    Endometriosis (POA: Yes)    Pelvic pain (POA: Yes)  Resolved Problems:    Hyperkalemia (POA: Yes)    Asymptomatic bacteriuria (POA: Yes)    Bradycardia (POA: Yes)    Drug-induced hepatotoxicity (POA: Yes)    Hypokalemia (POA: Unknown)      FOLLOW UP  No future appointments.  No follow-up provider specified.    MEDICATIONS ON DISCHARGE     Medication List        START taking these medications        Instructions   promethazine 12.5 MG Supp  Commonly known as: Phenergan   Insert 1-2 Suppositories into the rectum every four hours as needed for Nausea/Vomiting (if no relief from ondansetron or if ondansetron is not ordered or if unable to tolerate PO).  Dose: 12.5-25 mg     scopolamine 1 mg/72hr Pt72  Start taking on: August 23, 2023  Commonly known as: Transderm-Scop   Place 1 Patch on the skin every 72 hours.  Dose: 1 Patch            CHANGE how you take these medications        Instructions   albuterol 108 (90 Base) MCG/ACT Aers inhalation aerosol  What changed: reasons to take this   Inhale 2 Puffs by mouth every four hours as needed.  Dose: 2 Puff            CONTINUE taking these medications        Instructions   ibuprofen 200 MG Tabs  Commonly known as: Motrin   Take  200 mg by mouth every 6 hours as needed for Mild Pain or Inflammation.  Dose: 200 mg            STOP taking these medications      TYLENOL PO              Allergies  Allergies   Allergen Reactions    Carrot [Daucus Carota] Anaphylaxis     Only raw carrot; cooked carrot ok.    Ciprofloxacin Hives    Keflex      Mild hand swelling    Ketorolac Tromethamine Rash    Morphine Hives    Penicillins Rash       DIET  Orders Placed This Encounter   Procedures    Diet Order Diet: Regular     Standing Status:   Standing     Number of Occurrences:   1     Order Specific Question:   Diet:     Answer:   Regular [1]       ACTIVITY  As tolerated.  Weight bearing as tolerated    CONSULTATIONS  Poison control    PROCEDURES  US-PELVIC COMPLETE (TRANSABDOMINAL/TRANSVAGINAL) (COMBO)   Final Result      1.  Stable 3 cm uterine fibroid.   2.  Ovaries were not visualized. Patient requested to stop the transvaginal portion of the exam prior to visualizing the ovaries. Therefore, Doppler evaluation of the ovaries could not be performed.          LABORATORY  Lab Results   Component Value Date    SODIUM 140 08/21/2023    POTASSIUM 3.6 08/21/2023    CHLORIDE 103 08/21/2023    CO2 23 08/21/2023    GLUCOSE 87 08/21/2023    BUN 12 08/21/2023    CREATININE 0.70 08/21/2023    CREATININE 0.8 05/21/2008        Lab Results   Component Value Date    WBC 8.5 08/21/2023    HEMOGLOBIN 14.5 08/21/2023    HEMATOCRIT 42.3 08/21/2023    PLATELETCT 149 (L) 08/21/2023        Total time of the discharge process exceeds 45 minutes.

## 2023-08-21 NOTE — CARE PLAN
The patient is Watcher - Medium risk of patient condition declining or worsening    Shift Goals  Clinical Goals: nausea control and nausea  Patient Goals: nausea and pain management    Progress made toward(s) clinical / shift goals:    Patient educated on pain management options. Discussed non pharmacological pain interventions like ice v heat. Discussed frequency of pain medications.   Patient has been nauseous. Discussed nausea regimen available. Have attempted marco and other carbonated drinks that patient state help her with nausea. Have been ensuring that she is voiding which she has been.     Patient is not progressing towards the following goals:     Problem: Pain - Standard  Goal: Alleviation of pain or a reduction in pain to the patient’s comfort goal of 5 by end of shift   Outcome: Progressing     Problem: Fluid Volume  Goal: Fluid volume balance will be maintained throughout shift as shown by patient urinating a min of 30 ml/ hr and patient receiving IV fluids throughout shift   Outcome: Progressing

## 2023-08-21 NOTE — PROGRESS NOTES
0900: Received report. Patient IV came off this morning and a new one had to be started. This one also went bad after pain medications and a third one had to be started. Patient pain management discussed. Nausea medications also reviewed. Monitor I.O. Patient asking for foil for her soda which I informed her we did not have. Answered questions at this time. Bed locked and in low position

## 2024-04-29 ENCOUNTER — HOSPITAL ENCOUNTER (EMERGENCY)
Facility: MEDICAL CENTER | Age: 39
End: 2024-04-29
Attending: EMERGENCY MEDICINE
Payer: COMMERCIAL

## 2024-04-29 VITALS
HEIGHT: 64 IN | DIASTOLIC BLOOD PRESSURE: 55 MMHG | RESPIRATION RATE: 15 BRPM | WEIGHT: 185.41 LBS | TEMPERATURE: 97.8 F | OXYGEN SATURATION: 94 % | BODY MASS INDEX: 31.65 KG/M2 | SYSTOLIC BLOOD PRESSURE: 111 MMHG | HEART RATE: 63 BPM

## 2024-04-29 DIAGNOSIS — K02.9 INFECTED DENTAL CARIES: ICD-10-CM

## 2024-04-29 DIAGNOSIS — K04.7 INFECTED DENTAL CARIES: ICD-10-CM

## 2024-04-29 DIAGNOSIS — K08.89 DENTALGIA: ICD-10-CM

## 2024-04-29 PROCEDURE — 700102 HCHG RX REV CODE 250 W/ 637 OVERRIDE(OP): Mod: UD | Performed by: EMERGENCY MEDICINE

## 2024-04-29 PROCEDURE — A9270 NON-COVERED ITEM OR SERVICE: HCPCS | Mod: UD | Performed by: EMERGENCY MEDICINE

## 2024-04-29 PROCEDURE — 99283 EMERGENCY DEPT VISIT LOW MDM: CPT

## 2024-04-29 RX ORDER — CLINDAMYCIN HYDROCHLORIDE 150 MG/1
450 CAPSULE ORAL 3 TIMES DAILY
Qty: 63 CAPSULE | Refills: 0 | Status: ACTIVE | OUTPATIENT
Start: 2024-04-29 | End: 2024-05-06

## 2024-04-29 RX ORDER — OXYCODONE HYDROCHLORIDE 5 MG/1
5 TABLET ORAL EVERY 4 HOURS PRN
Qty: 5 TABLET | Refills: 0 | Status: SHIPPED | OUTPATIENT
Start: 2024-04-29 | End: 2024-05-01

## 2024-04-29 RX ORDER — OXYCODONE HYDROCHLORIDE 5 MG/1
5 TABLET ORAL ONCE
Status: COMPLETED | OUTPATIENT
Start: 2024-04-29 | End: 2024-04-29

## 2024-04-29 RX ORDER — CLINDAMYCIN HYDROCHLORIDE 150 MG/1
450 CAPSULE ORAL ONCE
Status: COMPLETED | OUTPATIENT
Start: 2024-04-29 | End: 2024-04-29

## 2024-04-29 RX ADMIN — OXYCODONE 5 MG: 5 TABLET ORAL at 22:48

## 2024-04-29 RX ADMIN — CLINDAMYCIN HYDROCHLORIDE 450 MG: 150 CAPSULE ORAL at 22:48

## 2024-04-29 ASSESSMENT — PAIN DESCRIPTION - PAIN TYPE: TYPE: ACUTE PAIN

## 2024-04-29 ASSESSMENT — FIBROSIS 4 INDEX: FIB4 SCORE: 0.84

## 2024-04-30 NOTE — DISCHARGE INSTRUCTIONS
Follow-up with your dentist as scheduled on Thursday for reevaluation and definitive treatment.    Clindamycin 3 times daily for 7 days for suspected dental infection.  Motrin 6 mg every 6 hours as needed for discomfort.  Oxycodone every 6 hours as needed for breakthrough pain.    Soft, room temperature diet as tolerated.    Return to the emergency department for persistent or worsening dental pain, oral or facial swelling, difficulty swallowing or breathing, change in voice, neck pain or stiffness, fever, vomiting or if no improvement noted after 48 hours of antibiotics, or for other new concerns.

## 2024-04-30 NOTE — ED PROVIDER NOTES
ED Provider Note    CHIEF COMPLAINT  Chief Complaint   Patient presents with    Tooth Abscess     Mouth swelling left side of face. Patient reports swelling started yesterday.     Tooth Ache     Patient reports lower left tooth pain.        EXTERNAL RECORDS REVIEWED  Other chronic abdominal pain, pelvic pain.    HPI/ROS  LIMITATION TO HISTORY   Select: : None  OUTSIDE HISTORIAN(S):  None    Lizz Storey is a 39 y.o. female who presents to the emergency department through triage for dental pain.  Patient describes history of poor dentition.  Wears a complete denture on the upper teeth.  States she had a broken tooth 9 to 10 months ago left lower molar that had not bothered her until recently.  Few days of progressive discomfort, now swelling.  Pain with mastication.  No neck pain or stiffness.  No fever or chills.  No nausea or vomiting.    Patient does have a dentist, , has an appointment scheduled on Thursday.    PAST MEDICAL HISTORY   has a past medical history of ASTHMA, Cyst of ovary, left, Endometriosis, Fibroid, uterus, Genital herpes in women, HPV in female, Kidney infection, and Psychiatric disorder.    SURGICAL HISTORY   has a past surgical history that includes jean by laparoscopy; laparoscopy; other abdominal surgery; and appendectomy.    FAMILY HISTORY  History reviewed. No pertinent family history.    SOCIAL HISTORY  Social History     Tobacco Use    Smoking status: Every Day     Current packs/day: 0.25     Average packs/day: 0.3 packs/day for 15.0 years (3.8 ttl pk-yrs)     Types: Cigarettes    Smokeless tobacco: Never    Tobacco comments:     1.5 years   Vaping Use    Vaping Use: Never used   Substance and Sexual Activity    Alcohol use: No     Comment: 2 times per year    Drug use: Yes     Types: Inhaled, Marijuana     Comment: marijuana 2 x per month    Sexual activity: Not Currently       CURRENT MEDICATIONS  Home Medications       Reviewed by Josee Decker R.N. (Registered Nurse)  "on 04/29/24 at 1855  Med List Status: Partial     Medication Last Dose Status   albuterol 108 (90 Base) MCG/ACT Aero Soln inhalation aerosol  Active   ibuprofen (MOTRIN) 200 MG Tab  Active   promethazine (PHENERGAN) 12.5 MG Suppos  Active   scopolamine (TRANSDERM-SCOP) 1 mg/72hr PATCH 72 HR  Active                    ALLERGIES  Allergies   Allergen Reactions    Carrot [Daucus Carota] Anaphylaxis     Only raw carrot; cooked carrot ok.    Ciprofloxacin Hives    Keflex      Mild hand swelling    Ketorolac Tromethamine Rash    Morphine Hives    Penicillins Rash       PHYSICAL EXAM  VITAL SIGNS: /75   Pulse 81   Temp 36.6 °C (97.8 °F) (Temporal)   Resp 16   Ht 1.626 m (5' 4\")   Wt 84.1 kg (185 lb 6.5 oz)   SpO2 93%   BMI 31.83 kg/m²    Pulse ox interpretation: I interpret this pulse ox as normal.  Constitutional: Alert in no apparent distress.  HENT: Normocephalic, atraumatic. Bilateral external ears normal, Nose normal. Moist mucous membranes.  Upper dentures not in place.  Fractured tooth, tender to percussion left lower premolar.  Mild gingival induration without fluctuance.  No facial cellulitis.  No lingual elevation.  Eyes: Pupils are equal and reactive, Conjunctiva normal.   Neck: Normal range of motion, Supple.   Lymphatic: No lymphadenopathy noted.  No cervical or submandibular lymphadenopathy.  Cardiovascular: Normal peripheral perfusion  Thorax & Lungs: Nonlabored respirations  Skin: Warm, Dry, No erythema, No rash.   Musculoskeletal: Good range of motion in all major joints  Neurologic: Alert and orient x 4.  Speech clear and cohesive.  Psychiatric: Affect normal, Judgment normal, Mood normal.       COURSE & MEDICAL DECISION MAKING    ASSESSMENT, COURSE AND PLAN  Care Narrative:   ED evaluation for dental pain was suggestive of infected dental caries, suspect apical dental abscess.  There is gingival induration without fluctuance or drainable abscess at this time.  No facial cellulitis.  No " clinical evidence for Bolivar's, meningitis or other airway compromise.  First dose clindamycin (penicillin and Keflex allergic, rash) in the emergency department and will continue for 7 days. Tolerated oral medication, fluids without difficulty.  Oxycodone given as well, Motrin only slightly effective prior to arrival.  Will continue oxycodone for breakthrough pain presents the next couple of days, patient requested 5 tablets only.  Hemodynamically stable that fever, tachycardia, hypotension or hypoxia.  She is established with a dentist and has follow-up on Thursday.  Will return for worsening symptoms or no improvement noted after 48 hours.    ADDITIONAL PROBLEMS MANAGED    DISPOSITION AND DISCUSSIONS    Discussion of management with other [facility] or appropriate source(s): None     Escalation of care considered, and ultimately not performed:Laboratory analysis and diagnostic imaging    Decision tools and prescription drugs considered including, but not limited to: Antibiotics clindamycin for suspected dental infection and Pain Medications NSAIDs and narcotic, oxycodone, for breakthrough pain .    In prescribing controlled substances to this patient, I certify that I have obtained and reviewed the medical history of Lizz Storey. I have also made a good jaylon effort to obtain applicable records from other providers who have treated the patient and records did not demonstrate any increased risk of substance abuse that would prevent me from prescribing controlled substances.     I have conducted a physical exam and documented it. I have reviewed Ms. Storey’s prescription history as maintained by the [place] Prescription Monitoring Program.     I have assessed the patient’s risk for abuse, dependency, and addiction using the validated Opioid Risk Tool available at https://www.mdcalc.com/gntmhy-mcyj-zgth-ort-narcotic-abuse.     Given the above, I believe the benefits of controlled substance therapy outweigh the  risks. The reasons for prescribing controlled substances include non-narcotic, oral analgesic alternatives have been inadequate for pain control. Accordingly, I have discussed the risk and benefits, treatment plan, and alternative therapies with the patient.     The patient is stable for discharge home, anticipatory guidance provided, clindamycin for 7 days, Motrin for discomfort, oxycodone for breakthrough pain, close follow-up is encouraged and strict return instructions have been discussed. Patient is agreeable to the disposition and plan.      FINAL DIAGNOSIS  1. Dentalgia    2. Infected dental caries           Electronically signed by: Alice Recinos D.O., 4/29/2024 10:20 PM

## 2024-04-30 NOTE — ED TRIAGE NOTES
Chief Complaint   Patient presents with    Tooth Abscess     Mouth swelling left side of face. Patient reports swelling started yesterday.     Tooth Ache     Patient reports lower left tooth pain.

## 2024-09-16 ENCOUNTER — HOSPITAL ENCOUNTER (EMERGENCY)
Facility: MEDICAL CENTER | Age: 39
End: 2024-09-16
Attending: EMERGENCY MEDICINE
Payer: MEDICAID

## 2024-09-16 VITALS
BODY MASS INDEX: 32.24 KG/M2 | SYSTOLIC BLOOD PRESSURE: 108 MMHG | HEART RATE: 58 BPM | RESPIRATION RATE: 18 BRPM | WEIGHT: 187.83 LBS | OXYGEN SATURATION: 93 % | DIASTOLIC BLOOD PRESSURE: 74 MMHG | TEMPERATURE: 98.4 F

## 2024-09-16 DIAGNOSIS — R10.32 LEFT LOWER QUADRANT ABDOMINAL PAIN: ICD-10-CM

## 2024-09-16 DIAGNOSIS — N80.9 ENDOMETRIOSIS: ICD-10-CM

## 2024-09-16 LAB
ALBUMIN SERPL BCP-MCNC: 4 G/DL (ref 3.2–4.9)
ALBUMIN/GLOB SERPL: 1.4 G/DL
ALP SERPL-CCNC: 112 U/L (ref 30–99)
ALT SERPL-CCNC: 23 U/L (ref 2–50)
ANION GAP SERPL CALC-SCNC: 10 MMOL/L (ref 7–16)
AST SERPL-CCNC: 22 U/L (ref 12–45)
BASOPHILS # BLD AUTO: 0.5 % (ref 0–1.8)
BASOPHILS # BLD: 0.02 K/UL (ref 0–0.12)
BILIRUB SERPL-MCNC: <0.2 MG/DL (ref 0.1–1.5)
BUN SERPL-MCNC: 10 MG/DL (ref 8–22)
CALCIUM ALBUM COR SERPL-MCNC: 8.7 MG/DL (ref 8.5–10.5)
CALCIUM SERPL-MCNC: 8.7 MG/DL (ref 8.5–10.5)
CHLORIDE SERPL-SCNC: 104 MMOL/L (ref 96–112)
CO2 SERPL-SCNC: 25 MMOL/L (ref 20–33)
CREAT SERPL-MCNC: 0.72 MG/DL (ref 0.5–1.4)
EOSINOPHIL # BLD AUTO: 0.05 K/UL (ref 0–0.51)
EOSINOPHIL NFR BLD: 1.2 % (ref 0–6.9)
ERYTHROCYTE [DISTWIDTH] IN BLOOD BY AUTOMATED COUNT: 43.7 FL (ref 35.9–50)
GFR SERPLBLD CREATININE-BSD FMLA CKD-EPI: 108 ML/MIN/1.73 M 2
GLOBULIN SER CALC-MCNC: 2.9 G/DL (ref 1.9–3.5)
GLUCOSE SERPL-MCNC: 108 MG/DL (ref 65–99)
HCG SERPL QL: NEGATIVE
HCT VFR BLD AUTO: 51.1 % (ref 37–47)
HGB BLD-MCNC: 16.9 G/DL (ref 12–16)
IMM GRANULOCYTES # BLD AUTO: 0.01 K/UL (ref 0–0.11)
IMM GRANULOCYTES NFR BLD AUTO: 0.2 % (ref 0–0.9)
LIPASE SERPL-CCNC: 15 U/L (ref 11–82)
LYMPHOCYTES # BLD AUTO: 1.58 K/UL (ref 1–4.8)
LYMPHOCYTES NFR BLD: 37.1 % (ref 22–41)
MCH RBC QN AUTO: 29.7 PG (ref 27–33)
MCHC RBC AUTO-ENTMCNC: 33.1 G/DL (ref 32.2–35.5)
MCV RBC AUTO: 89.8 FL (ref 81.4–97.8)
MONOCYTES # BLD AUTO: 0.21 K/UL (ref 0–0.85)
MONOCYTES NFR BLD AUTO: 4.9 % (ref 0–13.4)
NEUTROPHILS # BLD AUTO: 2.39 K/UL (ref 1.82–7.42)
NEUTROPHILS NFR BLD: 56.1 % (ref 44–72)
NRBC # BLD AUTO: 0 K/UL
NRBC BLD-RTO: 0 /100 WBC (ref 0–0.2)
PLATELET # BLD AUTO: 224 K/UL (ref 164–446)
PMV BLD AUTO: 9.9 FL (ref 9–12.9)
POTASSIUM SERPL-SCNC: 4.3 MMOL/L (ref 3.6–5.5)
PROT SERPL-MCNC: 6.9 G/DL (ref 6–8.2)
RBC # BLD AUTO: 5.69 M/UL (ref 4.2–5.4)
SODIUM SERPL-SCNC: 139 MMOL/L (ref 135–145)
WBC # BLD AUTO: 4.3 K/UL (ref 4.8–10.8)

## 2024-09-16 PROCEDURE — 99285 EMERGENCY DEPT VISIT HI MDM: CPT

## 2024-09-16 PROCEDURE — 96372 THER/PROPH/DIAG INJ SC/IM: CPT

## 2024-09-16 PROCEDURE — 700102 HCHG RX REV CODE 250 W/ 637 OVERRIDE(OP): Mod: UD | Performed by: EMERGENCY MEDICINE

## 2024-09-16 PROCEDURE — 36415 COLL VENOUS BLD VENIPUNCTURE: CPT

## 2024-09-16 PROCEDURE — 83690 ASSAY OF LIPASE: CPT

## 2024-09-16 PROCEDURE — 700111 HCHG RX REV CODE 636 W/ 250 OVERRIDE (IP): Mod: JZ,UD | Performed by: EMERGENCY MEDICINE

## 2024-09-16 PROCEDURE — 85025 COMPLETE CBC W/AUTO DIFF WBC: CPT

## 2024-09-16 PROCEDURE — A9270 NON-COVERED ITEM OR SERVICE: HCPCS | Mod: UD | Performed by: EMERGENCY MEDICINE

## 2024-09-16 PROCEDURE — 84703 CHORIONIC GONADOTROPIN ASSAY: CPT

## 2024-09-16 PROCEDURE — 80053 COMPREHEN METABOLIC PANEL: CPT

## 2024-09-16 RX ORDER — ONDANSETRON 4 MG/1
4 TABLET, ORALLY DISINTEGRATING ORAL ONCE
Status: COMPLETED | OUTPATIENT
Start: 2024-09-16 | End: 2024-09-16

## 2024-09-16 RX ORDER — HYDROMORPHONE HYDROCHLORIDE 2 MG/ML
2 INJECTION, SOLUTION INTRAMUSCULAR; INTRAVENOUS; SUBCUTANEOUS ONCE
Status: COMPLETED | OUTPATIENT
Start: 2024-09-16 | End: 2024-09-16

## 2024-09-16 RX ADMIN — HYDROMORPHONE HYDROCHLORIDE 2 MG: 2 INJECTION INTRAMUSCULAR; INTRAVENOUS; SUBCUTANEOUS at 11:38

## 2024-09-16 RX ADMIN — ONDANSETRON 4 MG: 4 TABLET, ORALLY DISINTEGRATING ORAL at 11:38

## 2024-09-16 RX ADMIN — IBUPROFEN 800 MG: 200 TABLET, FILM COATED ORAL at 11:37

## 2024-09-16 ASSESSMENT — FIBROSIS 4 INDEX: FIB4 SCORE: 0.84

## 2024-09-16 NOTE — ED TRIAGE NOTES
Chief Complaint   Patient presents with    Abdominal Pain     Low abd pain to the left and middle of abd    N/V    Vaginal Bleeding     Pt states sx since Friday night     Hx endometrosis

## 2024-09-16 NOTE — ED PROVIDER NOTES
ED Provider Note    CHIEF COMPLAINT  Chief Complaint   Patient presents with    Abdominal Pain     Low abd pain to the left and middle of abd    N/V    Vaginal Bleeding     Pt states sx since Friday night       EXTERNAL RECORDS REVIEWED  Inpatient Notes discharge summary reviewed from last month.  Patient was hospitalized for abdominal pain.  History of endometriosis and uterine fibroids.  She has chronic recurrent fibroid pain.  She was hospitalized for Tylenol toxicity.    HPI/ROS  LIMITATION TO HISTORY   Select: : None  OUTSIDE HISTORIAN(S):  none    Lizz Storey is a 39 y.o. female who presents for evaluation of lower abdominal pain.  She feels like she is having an endometriosis flare.  She says the last 1 was about a year ago.  She is not sexually active but she is starting to have pain in her left lower abdomen and middle which is typically where she feels her pain.  She has a little bit of vaginal discharge.  She denies any concern for STDs given she has not been sexually active in quite some time.  No chance of being pregnant either.  She has an appointment with a gynecologist next week.  She says this is typical for her pain.  She is on methadone.  Usually she gets some pain medication here to help tide her over until she is able to see her doctor.  No fevers or chills.    PAST MEDICAL HISTORY   has a past medical history of ASTHMA, Cyst of ovary, left, Endometriosis, Fibroid, uterus, Genital herpes in women, HPV in female, Kidney infection, and Psychiatric disorder.    SURGICAL HISTORY   has a past surgical history that includes jean by laparoscopy; laparoscopy; other abdominal surgery; and appendectomy.    FAMILY HISTORY  No family history on file.    SOCIAL HISTORY  Social History     Tobacco Use    Smoking status: Every Day     Current packs/day: 0.25     Average packs/day: 0.3 packs/day for 15.0 years (3.8 ttl pk-yrs)     Types: Cigarettes    Smokeless tobacco: Never    Tobacco comments:      1.5 years   Vaping Use    Vaping status: Never Used   Substance and Sexual Activity    Alcohol use: No     Comment: 2 times per year    Drug use: Yes     Types: Inhaled, Marijuana     Comment: marijuana 2 x per month    Sexual activity: Not Currently       CURRENT MEDICATIONS  Home Medications    **Home medications have not yet been reviewed for this encounter**         ALLERGIES  Allergies   Allergen Reactions    Carrot [Daucus Carota] Anaphylaxis     Only raw carrot; cooked carrot ok.    Ciprofloxacin Hives    Keflex      Mild hand swelling    Ketorolac Tromethamine Rash    Morphine Hives    Penicillins Rash       PHYSICAL EXAM  VITAL SIGNS: BP 99/69   Pulse 77   Temp 36.7 °C (98.1 °F) (Temporal)   Resp 18   Wt 85.2 kg (187 lb 13.3 oz)   SpO2 99%   BMI 32.24 kg/m²    Constitutional: Alert in no apparent distress.  HENT: Normocephalic, Atraumatic, Bilateral external ears normal. Nose normal.   Eyes:  Conjunctiva normal, non-icteric.   Heart: Regular rate and rhythm, no murmurs.   Lungs: Non-labored respirations  Abdomen: Soft, left lower tenderness without rebound or guarding  Skin: Warm, Dry, No erythema, No rash.   Neurologic: Alert, Grossly non-focal.   Psychiatric: Affect appropriate, tearful, Judgment normal, Mood normal, Appears appropriate and not intoxicated.   Extremities: Intact distal pulses, No edema, No tenderness.       EKG/LABS  Results for orders placed or performed during the hospital encounter of 09/16/24   CBC WITH DIFFERENTIAL   Result Value Ref Range    WBC 4.3 (L) 4.8 - 10.8 K/uL    RBC 5.69 (H) 4.20 - 5.40 M/uL    Hemoglobin 16.9 (H) 12.0 - 16.0 g/dL    Hematocrit 51.1 (H) 37.0 - 47.0 %    MCV 89.8 81.4 - 97.8 fL    MCH 29.7 27.0 - 33.0 pg    MCHC 33.1 32.2 - 35.5 g/dL    RDW 43.7 35.9 - 50.0 fL    Platelet Count 224 164 - 446 K/uL    MPV 9.9 9.0 - 12.9 fL    Neutrophils-Polys 56.10 44.00 - 72.00 %    Lymphocytes 37.10 22.00 - 41.00 %    Monocytes 4.90 0.00 - 13.40 %    Eosinophils 1.20  0.00 - 6.90 %    Basophils 0.50 0.00 - 1.80 %    Immature Granulocytes 0.20 0.00 - 0.90 %    Nucleated RBC 0.00 0.00 - 0.20 /100 WBC    Neutrophils (Absolute) 2.39 1.82 - 7.42 K/uL    Lymphs (Absolute) 1.58 1.00 - 4.80 K/uL    Monos (Absolute) 0.21 0.00 - 0.85 K/uL    Eos (Absolute) 0.05 0.00 - 0.51 K/uL    Baso (Absolute) 0.02 0.00 - 0.12 K/uL    Immature Granulocytes (abs) 0.01 0.00 - 0.11 K/uL    NRBC (Absolute) 0.00 K/uL   COMP METABOLIC PANEL   Result Value Ref Range    Sodium 139 135 - 145 mmol/L    Potassium 4.3 3.6 - 5.5 mmol/L    Chloride 104 96 - 112 mmol/L    Co2 25 20 - 33 mmol/L    Anion Gap 10.0 7.0 - 16.0    Glucose 108 (H) 65 - 99 mg/dL    Bun 10 8 - 22 mg/dL    Creatinine 0.72 0.50 - 1.40 mg/dL    Calcium 8.7 8.5 - 10.5 mg/dL    Correct Calcium 8.7 8.5 - 10.5 mg/dL    AST(SGOT) 22 12 - 45 U/L    ALT(SGPT) 23 2 - 50 U/L    Alkaline Phosphatase 112 (H) 30 - 99 U/L    Total Bilirubin <0.2 0.1 - 1.5 mg/dL    Albumin 4.0 3.2 - 4.9 g/dL    Total Protein 6.9 6.0 - 8.2 g/dL    Globulin 2.9 1.9 - 3.5 g/dL    A-G Ratio 1.4 g/dL   LIPASE   Result Value Ref Range    Lipase 15 11 - 82 U/L   HCG QUAL SERUM   Result Value Ref Range    Beta-Hcg Qualitative Serum Negative Negative   ESTIMATED GFR   Result Value Ref Range    GFR (CKD-EPI) 108 >60 mL/min/1.73 m 2       I have independently interpreted this EKG      COURSE & MEDICAL DECISION MAKING    ASSESSMENT, COURSE AND PLAN  Care Narrative: This is a 39-year-old female who with a history of endometriosis presenting with pain related to the same.  Labs are reassuring.  She has had multiple prior images I do not think she needs additional imaging as the pain is similar to what she is experienced in the past.  Patient was given pain medication with improvement she will be discharged.        DISPOSITION AND DISCUSSIONS  I have discussed management of the patient with the following physicians and QUAN's:  none    Discussion of management with other [facility] or appropriate  source(s): None     Escalation of care considered, and ultimately not performed:diagnostic imaging    Barriers to care at this time, including but not limited to:  none .     Decision tools and prescription drugs considered including, but not limited to:  none .     The patient will return for new or worsening symptoms and is stable at the time of discharge. Patient was given return precautions. Patient and/or family member verbalizes understanding and will comply.    DISPOSITION:  Patient will be discharged home in stable condition.    FOLLOW UP:  [facility], Emergency Dept  [address] [place] [id number] [phone]    Return to the emergency department for worsening pain vomiting or other concerns.      OUTPATIENT MEDICATIONS:  Discharge Medication List as of 9/16/2024 12:15 PM            FINAL DIAGNOSIS  1. Left lower quadrant abdominal pain    2. Endometriosis         Electronically signed by: Victorina Rosa M.D., 9/16/2024 10:50 AM

## 2024-09-16 NOTE — ED NOTES
Patient given discharge instructions and education. Verbalizes understanding. Given chance to ask questions. Patient educated on signs and symptoms to returned to ER, Educated patient they can return for any concerning symptoms. Patient states they have their belongings. Denies additional needs at this time. Reports pain is well controlled. Patient monitored every hour and PRN for safety and comfort. Patient ambulatory out of department.

## 2024-10-31 ENCOUNTER — PATIENT OUTREACH (OUTPATIENT)
Dept: ONCOLOGY | Facility: MEDICAL CENTER | Age: 39
End: 2024-10-31
Payer: MEDICAID

## 2025-01-08 NOTE — ED NOTES
Pt understands DC instructions and follow-up, DC paperwork and work note provided, pt ambulated with steady gait to NEHA rodriguez for DC   Yes, proceed